# Patient Record
Sex: FEMALE | Race: ASIAN | NOT HISPANIC OR LATINO | Employment: UNEMPLOYED | URBAN - METROPOLITAN AREA
[De-identification: names, ages, dates, MRNs, and addresses within clinical notes are randomized per-mention and may not be internally consistent; named-entity substitution may affect disease eponyms.]

---

## 2017-04-19 ENCOUNTER — OFFICE VISIT (OUTPATIENT)
Dept: FAMILY MEDICINE | Facility: CLINIC | Age: 42
End: 2017-04-19
Payer: COMMERCIAL

## 2017-04-19 VITALS
SYSTOLIC BLOOD PRESSURE: 104 MMHG | RESPIRATION RATE: 10 BRPM | DIASTOLIC BLOOD PRESSURE: 62 MMHG | WEIGHT: 136 LBS | BODY MASS INDEX: 25.68 KG/M2 | HEIGHT: 61 IN | HEART RATE: 76 BPM | TEMPERATURE: 97.8 F | OXYGEN SATURATION: 99 %

## 2017-04-19 DIAGNOSIS — R53.83 OTHER FATIGUE: ICD-10-CM

## 2017-04-19 DIAGNOSIS — L98.9 SKIN LESION: ICD-10-CM

## 2017-04-19 DIAGNOSIS — Z12.31 ENCOUNTER FOR SCREENING MAMMOGRAM FOR BREAST CANCER: ICD-10-CM

## 2017-04-19 DIAGNOSIS — Z00.00 ROUTINE GENERAL MEDICAL EXAMINATION AT A HEALTH CARE FACILITY: Primary | ICD-10-CM

## 2017-04-19 DIAGNOSIS — T83.32XA IUD STRINGS LOST: ICD-10-CM

## 2017-04-19 LAB
ALBUMIN SERPL-MCNC: 3.9 G/DL (ref 3.4–5)
ALP SERPL-CCNC: 74 U/L (ref 40–150)
ALT SERPL W P-5'-P-CCNC: 37 U/L (ref 0–50)
ANION GAP SERPL CALCULATED.3IONS-SCNC: 10 MMOL/L (ref 3–14)
AST SERPL W P-5'-P-CCNC: 20 U/L (ref 0–45)
BASOPHILS # BLD AUTO: 0 10E9/L (ref 0–0.2)
BASOPHILS NFR BLD AUTO: 0.3 %
BILIRUB SERPL-MCNC: 0.6 MG/DL (ref 0.2–1.3)
BUN SERPL-MCNC: 13 MG/DL (ref 7–30)
CALCIUM SERPL-MCNC: 9.7 MG/DL (ref 8.5–10.1)
CHLORIDE SERPL-SCNC: 106 MMOL/L (ref 94–109)
CHOLEST SERPL-MCNC: 163 MG/DL
CO2 SERPL-SCNC: 25 MMOL/L (ref 20–32)
CREAT SERPL-MCNC: 0.37 MG/DL (ref 0.52–1.04)
DIFFERENTIAL METHOD BLD: ABNORMAL
EOSINOPHIL # BLD AUTO: 0.1 10E9/L (ref 0–0.7)
EOSINOPHIL NFR BLD AUTO: 1.7 %
ERYTHROCYTE [DISTWIDTH] IN BLOOD BY AUTOMATED COUNT: 11.6 % (ref 10–15)
GFR SERPL CREATININE-BSD FRML MDRD: ABNORMAL ML/MIN/1.7M2
GLUCOSE SERPL-MCNC: 92 MG/DL (ref 70–99)
HCT VFR BLD AUTO: 39.8 % (ref 35–47)
HDLC SERPL-MCNC: 67 MG/DL
HGB BLD-MCNC: 13.2 G/DL (ref 11.7–15.7)
LDLC SERPL CALC-MCNC: 83 MG/DL
LYMPHOCYTES # BLD AUTO: 2 10E9/L (ref 0.8–5.3)
LYMPHOCYTES NFR BLD AUTO: 56.9 %
MCH RBC QN AUTO: 29.7 PG (ref 26.5–33)
MCHC RBC AUTO-ENTMCNC: 33.2 G/DL (ref 31.5–36.5)
MCV RBC AUTO: 89 FL (ref 78–100)
MICRO REPORT STATUS: NORMAL
MONOCYTES # BLD AUTO: 0.4 10E9/L (ref 0–1.3)
MONOCYTES NFR BLD AUTO: 11.7 %
NEUTROPHILS # BLD AUTO: 1 10E9/L (ref 1.6–8.3)
NEUTROPHILS NFR BLD AUTO: 29.4 %
NONHDLC SERPL-MCNC: 96 MG/DL
PLATELET # BLD AUTO: 268 10E9/L (ref 150–450)
POTASSIUM SERPL-SCNC: 4.3 MMOL/L (ref 3.4–5.3)
PROT SERPL-MCNC: 8.1 G/DL (ref 6.8–8.8)
RBC # BLD AUTO: 4.45 10E12/L (ref 3.8–5.2)
SODIUM SERPL-SCNC: 141 MMOL/L (ref 133–144)
SPECIMEN SOURCE: NORMAL
T4 FREE SERPL-MCNC: 3.02 NG/DL (ref 0.76–1.46)
TRIGL SERPL-MCNC: 67 MG/DL
TSH SERPL DL<=0.005 MIU/L-ACNC: <0.01 MU/L (ref 0.4–4)
WBC # BLD AUTO: 3.5 10E9/L (ref 4–11)
WET PREP SPEC: NORMAL

## 2017-04-19 PROCEDURE — 90471 IMMUNIZATION ADMIN: CPT | Performed by: NURSE PRACTITIONER

## 2017-04-19 PROCEDURE — 90715 TDAP VACCINE 7 YRS/> IM: CPT | Performed by: NURSE PRACTITIONER

## 2017-04-19 PROCEDURE — 80061 LIPID PANEL: CPT | Performed by: NURSE PRACTITIONER

## 2017-04-19 PROCEDURE — 87210 SMEAR WET MOUNT SALINE/INK: CPT | Performed by: NURSE PRACTITIONER

## 2017-04-19 PROCEDURE — 87624 HPV HI-RISK TYP POOLED RSLT: CPT | Performed by: NURSE PRACTITIONER

## 2017-04-19 PROCEDURE — 84439 ASSAY OF FREE THYROXINE: CPT | Performed by: NURSE PRACTITIONER

## 2017-04-19 PROCEDURE — 82306 VITAMIN D 25 HYDROXY: CPT | Performed by: NURSE PRACTITIONER

## 2017-04-19 PROCEDURE — 99386 PREV VISIT NEW AGE 40-64: CPT | Mod: 25 | Performed by: NURSE PRACTITIONER

## 2017-04-19 PROCEDURE — 36415 COLL VENOUS BLD VENIPUNCTURE: CPT | Performed by: NURSE PRACTITIONER

## 2017-04-19 PROCEDURE — 87491 CHLMYD TRACH DNA AMP PROBE: CPT | Performed by: NURSE PRACTITIONER

## 2017-04-19 PROCEDURE — 87591 N.GONORRHOEAE DNA AMP PROB: CPT | Performed by: NURSE PRACTITIONER

## 2017-04-19 PROCEDURE — G0145 SCR C/V CYTO,THINLAYER,RESCR: HCPCS | Performed by: NURSE PRACTITIONER

## 2017-04-19 PROCEDURE — 80050 GENERAL HEALTH PANEL: CPT | Performed by: NURSE PRACTITIONER

## 2017-04-19 NOTE — PROGRESS NOTES
SUBJECTIVE:     CC: Sunil Glass is an 42 year old woman who presents for preventive health visit.     Healthy Habits:    Do you get at least three servings of calcium containing foods daily (dairy, green leafy vegetables, etc.)? yes    Amount of exercise or daily activities, outside of work: 3 day(s) per week    Problems taking medications regularly No    Medication side effects: No    Have you had an eye exam in the past two years? no    Do you see a dentist twice per year? No    Do you have sleep apnea, excessive snoring or daytime drowsiness?no    Health Maintenance -- Patient reports last physical exam was 4 years ago.   GYN: She had an IUD that was placed 14 years ago in [place]. Periods are normal w/ light flow. Would like to have IDU removed and replaced.     Mammogram -- Has never had completed. Agrees to have done.    Pap Smear -- Will complete today.   Colonoscopy -- Not appropriate based on age    Skin -- States that she has a bump on her L thigh. She noticed it 2-3 years ago. Was very small upon onset but has been getting bigger. Denies pain or itching.    Social -- Single. Working as massage therapist. Has 2 children that live with her (daughter age 21, son age 14).      present for visit.    Today's PHQ-2 Score:   PHQ-2 ( 1999 Pfizer) 4/19/2017   Q1: Little interest or pleasure in doing things 0   Q2: Feeling down, depressed or hopeless 0   PHQ-2 Score 0     Abuse: Current or Past(Physical, Sexual or Emotional)- No  Do you feel safe in your environment - Yes    Social History   Substance Use Topics     Smoking status: Never Smoker     Smokeless tobacco: Never Used     Alcohol use No     The patient does not drink >3 drinks per day nor >7 drinks per week.    No results for input(s): CHOL, HDL, LDL, TRIG, CHOLHDLRATIO, NHDL in the last 59467 hours.    Reviewed orders with patient.  Reviewed health maintenance and updated orders accordingly - Yes    Mammo Decision Support:  Mammo discussed,  "patient agrees to have completed.    Pertinent mammograms are reviewed under the imaging tab.  History of abnormal Pap smear: NO - age 30- 65 PAP every 3 years recommended    Reviewed and updated as needed this visit by clinical staff  Tobacco  Allergies  Meds  Soc Hx      Reviewed and updated as needed this visit by Provider    No past medical history on file.     ROS:  Constitutional, HEENT, cardiovascular, pulmonary, GI, , musculoskeletal, neuro, skin, endocrine and psych systems are negative, except as in HPI or otherwise noted     This document serves as a record of the services and decisions personally performed and made by Susan Haase, CNP. It was created on her behalf by Kisha Abreu, a trained medical scribe. The creation of this document is based the provider's statements to the medical scribe.  Kisha Abreu April 19, 2017 8:38 AM     Problem list, Medication list, Allergies, and Medical/Social/Surgical histories reviewed in EPIC and updated as appropriate.  OBJECTIVE:     /62 (BP Location: Right arm, Patient Position: Chair, Cuff Size: Adult Regular)  Pulse 76  Temp 97.8  F (36.6  C) (Oral)  Resp 10  Ht 1.549 m (5' 1\")  Wt 61.7 kg (136 lb)  LMP 04/14/2017 (Exact Date)  SpO2 99%  Breastfeeding? No  BMI 25.7 kg/m2  EXAM:  GENERAL: healthy, alert and no distress  HENT: ear canals and TM's normal, nose and mouth without ulcers or lesions  NECK: no adenopathy, no asymmetry, masses, or scars and thyroid normal to palpation  RESP: lungs clear to auscultation - no rales, rhonchi or wheezes  BREAST: R breast w/ mole in R outer quadrant, normal without masses, tenderness or nipple discharge and no palpable axillary masses or adenopathy  CV: regular rate and rhythm, normal S1 S2, no S3 or S4, no murmur, click or rub, no peripheral edema  ABDOMEN: soft, nontender, no hepatosplenomegaly, no masses and bowel sounds normal   (female): normal female external genitalia, normal urethral " "meatus, vaginal mucosa pink, moist, well rugated, and normal cervix/adnexa/uterus without masses or discharge.  IUD strings are not visualized.  MS: no gross musculoskeletal defects noted, no edema  SKIN: 2.5cm papule on L lateral thigh, hyperpigmented, firm, and raised; no suspicious lesions or rashes  NEURO: Normal strength and tone, mentation intact and speech normal  PSYCH: mentation appears normal, affect normal/bright    ASSESSMENT/PLAN:       Sunil was seen today for physical.    Diagnoses and all orders for this visit:    Routine general medical examination at a health care facility  -     TDAP (ADACEL)  -     Lipid panel reflex to direct LDL  -     NEISSERIA GONORRHOEA PCR  -     CHLAMYDIA TRACHOMATIS PCR  -     Wet prep  -     Pap imaged thin layer screen with HPV - recommended age 30 - 65 years (select HPV order below)  -     HPV High Risk Types DNA Cervical    IUD strings lost: will get an ultrasound to ensure IUD is in place.    -     US Pelvic Complete w Transvaginal; Future    Other fatigue  -     CBC with platelets differential  -     Comprehensive metabolic panel  -     TSH with free T4 reflex  -     Vitamin D Deficiency    Skin lesion return in next 2-4 weeks for removal.     Encounter for screening mammogram for breast cancer  -     *MA Screening Digital Bilateral; Future      COUNSELING:   Reviewed preventive health counseling, as reflected in patient instructions     reports that she has never smoked. She has never used smokeless tobacco.    Estimated body mass index is 25.7 kg/(m^2) as calculated from the following:    Height as of this encounter: 1.549 m (5' 1\").    Weight as of this encounter: 61.7 kg (136 lb).     Patient Instructions: Patient will follow up with gynecology for US guided IUD removal and replacement. Recommended to follow up for removal of skin papule.    Counseling Resources:  ATP IV Guidelines  Pooled Cohorts Equation Calculator  Breast Cancer Risk Calculator  FRAX Risk " Assessment  ICSI Preventive Guidelines  Dietary Guidelines for Americans, 2010  USDA's MyPlate  ASA Prophylaxis  Lung CA Screening  Follow up in 2-4 weeks for lesion removal.    The information in this document, created by the medical scribe for me, accurately reflects the services I personally performed and the decisions made by me. I have reviewed and approved this document for accuracy.   Susan Haase, APRN CNP  [facility]

## 2017-04-19 NOTE — MR AVS SNAPSHOT
After Visit Summary   4/19/2017    Sunil Glass    MRN: [id number]           Patient Information     Date Of Birth          1975        Visit Information        Provider Department      4/19/2017 8:15 AM Haase, Susan Rachele, APRN CNP; [employer] [facility]        Today's Diagnoses     Routine general medical examination at a health care facility    -  1    Other fatigue        Skin lesion        Encounter for screening mammogram for breast cancer        IUD strings lost          Care Instructions    1. Follow up with OBGYN for IUD removal and replacement.    2. Follow up visit for removal of skin papule on left thigh.    Preventive Health Recommendations  Female Ages 40 to 49    Yearly exam:     See your health care provider every year in order to  1. Review health changes.   2. Discuss preventive care.    3. Review your medicines if your doctor prescribed any.      Get a Pap test every three years (unless you have an abnormal result and your provider advises testing more often).      If you get Pap tests with HPV test, you only need to test every 5 years, unless you have an abnormal result. You do not need a Pap test if your uterus was removed (hysterectomy) and you have not had cancer.      You should be tested each year for STDs (sexually transmitted diseases), if you're at risk.       Ask your doctor if you should have a mammogram.      Have a colonoscopy (test for colon cancer) if someone in your family has had colon cancer or polyps before age 50.       Have a cholesterol test every 5 years.       Have a diabetes test (fasting glucose) after age 45. If you are at risk for diabetes, you should have this test every 3 years.    Shots: Get a flu shot each year. Get a tetanus shot every 10 years.     Nutrition:     Eat at least 5 servings of fruits and vegetables each day.    Eat whole-grain bread, whole-wheat pasta and brown rice instead of white grains and  "rice.    Talk to your provider about Calcium and Vitamin D.     Lifestyle    Exercise at least 150 minutes a week (an average of 30 minutes a day, 5 days a week). This will help you control your weight and prevent disease.    Limit alcohol to one drink per day.    No smoking.     Wear sunscreen to prevent skin cancer.    See your dentist every six months for an exam and cleaning.        Follow-ups after your visit        Follow-up notes from your care team     Return in about 2 weeks (around 5/3/2017).      Future tests that were ordered for you today     Open Future Orders        Priority Expected Expires Ordered    US Pelvic Complete w Transvaginal Routine 7/18/2017 4/19/2018 4/19/2017    *MA Screening Digital Bilateral Routine  4/19/2018 4/19/2017            Who to contact     If you have questions or need follow up information about today's clinic visit or your schedule please contact [facility] directly at [phone].  Normal or non-critical lab and imaging results will be communicated to you by reQallhart, letter or phone within 4 business days after the clinic has received the results. If you do not hear from us within 7 days, please contact the clinic through reQallhart or phone. If you have a critical or abnormal lab result, we will notify you by phone as soon as possible.  Submit refill requests through Backpack or call your pharmacy and they will forward the refill request to us. Please allow 3 business days for your refill to be completed.          Additional Information About Your Visit        Backpack Information     Backpack lets you send messages to your doctor, view your test results, renew your prescriptions, schedule appointments and more. To sign up, go to [email or web address] . Click on \"Log in\" on the left side of the screen, which will take you to the Welcome page. Then click on \"Sign up Now\" on the right side of the page.     You will be asked to enter the access code listed " "below, as well as some personal information. Please follow the directions to create your username and password.     Your access code is: [email or web address]  Expires: 2017  9:04 AM     Your access code will  in 90 days. If you need help or a new code, please call your [facility] clinic or [phone].        Care EveryWhere ID     This is your Care EveryWhere ID. This could be used by other organizations to access your [facility] medical records  [id number]        Your Vitals Were     Pulse Temperature Respirations Height Last Period Pulse Oximetry    76 97.8  F (36.6  C) (Oral) 10 5' 1\" (1.549 m) 2017 (Exact Date) 99%    Breastfeeding? BMI (Body Mass Index)                No 25.7 kg/m2           Blood Pressure from Last 3 Encounters:   17 104/62    Weight from Last 3 Encounters:   17 136 lb (61.7 kg)              We Performed the Following     CBC with platelets differential     Comprehensive metabolic panel     Lipid panel reflex to direct LDL     TDAP (ADACEL)     TSH with free T4 reflex     Vitamin D Deficiency        Primary Care Provider    None Specified       No primary provider on file.        Thank you!     Thank you for choosing [facility]  for your care. Our goal is always to provide you with excellent care. Hearing back from our patients is one way we can continue to improve our services. Please take a few minutes to complete the written survey that you may receive in the mail after your visit with us. Thank you!             Your Updated Medication List - Protect others around you: Learn how to safely use, store and throw away your medicines at www.disposemymeds.org.          This list is accurate as of: 17  9:13 AM.  Always use your most recent med list.                   Brand Name Dispense Instructions for use    MULTIVITAMIN PO            "

## 2017-04-19 NOTE — NURSING NOTE
"Chief Complaint   Patient presents with     Physical       Initial /62 (BP Location: Right arm, Patient Position: Chair, Cuff Size: Adult Regular)  Pulse 76  Temp 97.8  F (36.6  C) (Oral)  Resp 10  Ht 5' 1\" (1.549 m)  Wt 136 lb (61.7 kg)  LMP 04/14/2017 (Exact Date)  SpO2 99%  Breastfeeding? No  BMI 25.7 kg/m2 Estimated body mass index is 25.7 kg/(m^2) as calculated from the following:    Height as of this encounter: 5' 1\" (1.549 m).    Weight as of this encounter: 136 lb (61.7 kg).  Medication Reconciliation: complete    "

## 2017-04-19 NOTE — LETTER
April 27, 2017    Tamica Glass  [address] [place] [address]    Dear Tamica,  We are happy to inform you that your PAP smear result from 04/19/17 is normal.  We are now able to do a follow up test on PAP smears. The DNA test is for HPV (Human Papilloma Virus). Cervical cancer is closely linked with certain types of HPV. Your result showed no evidence of high risk HPV.  Therefore we recommend you return in 3 years for your next pap smear.  You will still need to return to the clinic every year for an annual exam and other preventive tests.  Please contact the clinic at [phone] with any questions.  Sincerely,    Susan Haase, APRN CNP/esh

## 2017-04-19 NOTE — PATIENT INSTRUCTIONS
1. Follow up with OBGYN for IUD removal and replacement.    2. Follow up visit for removal of skin papule on left thigh.    Preventive Health Recommendations  Female Ages 40 to 49    Yearly exam:     See your health care provider every year in order to  1. Review health changes.   2. Discuss preventive care.    3. Review your medicines if your doctor prescribed any.      Get a Pap test every three years (unless you have an abnormal result and your provider advises testing more often).      If you get Pap tests with HPV test, you only need to test every 5 years, unless you have an abnormal result. You do not need a Pap test if your uterus was removed (hysterectomy) and you have not had cancer.      You should be tested each year for STDs (sexually transmitted diseases), if you're at risk.       Ask your doctor if you should have a mammogram.      Have a colonoscopy (test for colon cancer) if someone in your family has had colon cancer or polyps before age 50.       Have a cholesterol test every 5 years.       Have a diabetes test (fasting glucose) after age 45. If you are at risk for diabetes, you should have this test every 3 years.    Shots: Get a flu shot each year. Get a tetanus shot every 10 years.     Nutrition:     Eat at least 5 servings of fruits and vegetables each day.    Eat whole-grain bread, whole-wheat pasta and brown rice instead of white grains and rice.    Talk to your provider about Calcium and Vitamin D.     Lifestyle    Exercise at least 150 minutes a week (an average of 30 minutes a day, 5 days a week). This will help you control your weight and prevent disease.    Limit alcohol to one drink per day.    No smoking.     Wear sunscreen to prevent skin cancer.    See your dentist every six months for an exam and cleaning.

## 2017-04-20 LAB
C TRACH DNA SPEC QL NAA+PROBE: NORMAL
DEPRECATED CALCIDIOL+CALCIFEROL SERPL-MC: 23 UG/L (ref 20–75)
N GONORRHOEA DNA SPEC QL NAA+PROBE: NORMAL
SPECIMEN SOURCE: NORMAL
SPECIMEN SOURCE: NORMAL

## 2017-04-21 ENCOUNTER — TELEPHONE (OUTPATIENT)
Dept: FAMILY MEDICINE | Facility: CLINIC | Age: 42
End: 2017-04-21

## 2017-04-21 PROBLEM — E05.90 HYPERTHYROIDISM: Status: ACTIVE | Noted: 2017-04-21

## 2017-04-21 LAB
COPATH REPORT: NORMAL
PAP: NORMAL

## 2017-04-21 NOTE — TELEPHONE ENCOUNTER
Call out to pt, interrupter on phone, gave message, scheduled appt with ASTRID Coleman RN, BS  Clinical Nurse Triage.

## 2017-04-21 NOTE — TELEPHONE ENCOUNTER
Can you please call Sunil and let her know that her thyroid levels are quite abnormal, it appears that she has hyperthyroidism.  I would like her to be seen by endocrinology for further evaluation, please have her set up an appointment with Endocrinology, Glory Sorto, at our [facility].  The other labs were normal.  Thanks,  Susan Haase, CNP

## 2017-04-24 ENCOUNTER — OFFICE VISIT (OUTPATIENT)
Dept: FAMILY MEDICINE | Facility: CLINIC | Age: 42
End: 2017-04-24
Payer: COMMERCIAL

## 2017-04-24 VITALS
DIASTOLIC BLOOD PRESSURE: 70 MMHG | BODY MASS INDEX: 25.7 KG/M2 | HEART RATE: 76 BPM | RESPIRATION RATE: 10 BRPM | SYSTOLIC BLOOD PRESSURE: 110 MMHG | WEIGHT: 136 LBS | TEMPERATURE: 97.7 F

## 2017-04-24 DIAGNOSIS — E05.90 HYPERTHYROIDISM: ICD-10-CM

## 2017-04-24 DIAGNOSIS — L98.9 SKIN LESION OF LEFT LEG: Primary | ICD-10-CM

## 2017-04-24 PROCEDURE — 11301 SHAVE SKIN LESION 0.6-1.0 CM: CPT | Performed by: NURSE PRACTITIONER

## 2017-04-24 PROCEDURE — 99212 OFFICE O/P EST SF 10 MIN: CPT | Mod: 25 | Performed by: NURSE PRACTITIONER

## 2017-04-24 PROCEDURE — 88305 TISSUE EXAM BY PATHOLOGIST: CPT | Performed by: NURSE PRACTITIONER

## 2017-04-24 NOTE — PROGRESS NOTES
SUBJECTIVE:                                                    Sunil Glass is a 42 year old female who presents to clinic today for the following health issues:    Derm: mole removal    Reported bump on her L thigh at physical exam 5 days ago. Present for 2-3 years and increasing in size.    Thyroid -- Reports that she has felt more anxious, has occasional difficulty swallowing, feels very fatigued, and sometimes feels like her heart is racing. Has appt. scheduled with Glory MORALES) in endocrinology on 4/26.  Lab results from 4/19:   TSH was low at <0.01 mU/L   T4 Free was high at 3.02 ng/dL     present for visit.    Problem list and histories reviewed & adjusted, as indicated.  Additional history: as documented    Patient Active Problem List   Diagnosis     IUD strings lost     Hyperthyroidism     History reviewed. No pertinent surgical history.    Social History   Substance Use Topics     Smoking status: Never Smoker     Smokeless tobacco: Never Used     Alcohol use No     History reviewed. No pertinent family history.      Current Outpatient Prescriptions   Medication Sig Dispense Refill     Multiple Vitamins-Minerals (MULTIVITAMIN PO)        No Known Allergies    Reviewed and updated as needed this visit by clinical staff    Reviewed and updated as needed this visit by Provider    ROS:  Constitutional, HEENT, cardiovascular, pulmonary, GI, , musculoskeletal, neuro, skin, endocrine and psych systems are negative, except as otherwise noted.    This document serves as a record of the services and decisions personally performed and made by Susan Haase, CNP. It was created on her behalf by Kisha Abreu, a trained medical scribe. The creation of this document is based the provider's statements to the medical scribe.  Kisha Abreu April 24, 2017 10:58 AM   OBJECTIVE:                                                    /70 (BP Location: Left arm, Patient Position: Chair, Cuff Size: Adult  Regular)  Pulse 76  Temp 97.7  F (36.5  C) (Oral)  Resp 10  Wt 61.7 kg (136 lb)  LMP 04/14/2017 (Exact Date)  BMI 25.7 kg/m2  Body mass index is 25.7 kg/(m^2).  GENERAL: healthy, alert and no distress  SKIN: 1 cm lesion on lateral aspect of L thigh, no suspicious lesions or rashes  NEURO: Normal strength and tone, mentation intact and speech normal  PSYCH: mentation appears normal, affect normal/bright    Diagnostic Test Results:  none      ASSESSMENT/PLAN:                                                    Sunil was seen today for lesion removal and results.    Diagnoses and all orders for this visit:    Skin lesion of left leg  -     BIOPSY SKIN/SUBQ/MUC MEM, SINGLE LESION  -     Surgical pathology exam    Skin Biopsy Procedure Note: Lateral L thigh    Consent: Affirmation of informed consent was signed and scanned into the medical record. Risks, benefits and alternatives were discussed. Patient's questions were elicited and answered.     Technique:   Skin prep Betadine  Anesthesia 0.5 cc 1% lidocaine, with epi   Biopsy size 0.5 cm  Biopsy taken via (shave, punch, incisional) shave  Suture  none  Hemostasis  monopolar cautery     EBL:     0  Complications:    No  Tolerance:    Pt tolerated procedure well and was in stable condition.   Pathology sent:   Yes  Specimen Collection Time: 11:22    Instructions:    Pt should keep dressing in place for 24 hours, then may change and apply antibiotic ointment and simple bandage. May shower after 24 hours.  Pt was instructed to call if bleeding, severe pain or foul smell.   Follow up only if unimproved.  Hyperthyroidism:  Has an appt with endocrinology on 4/26/2017    Follow up after pelvic ultrasound completed to discuss results.     The information in this document, created by the medical scribe for me, accurately reflects the services I personally performed and the decisions made by me. I have reviewed and approved this document for accuracy.   Susan Haase, APRN  [facility] [place]

## 2017-04-24 NOTE — MR AVS SNAPSHOT
After Visit Summary   4/24/2017    Sunil Glass    MRN: [id number]           Patient Information     Date Of Birth          1975        Visit Information        Provider Department      4/24/2017 10:45 AM Haase, Susan Rachele, APRN CNP; [employer] [facility]        Today's Diagnoses     Skin lesion of left leg    -  1    Hyperthyroidism           Follow-ups after your visit        Follow-up notes from your care team     Return if symptoms worsen or fail to improve.      Your next 10 appointments already scheduled     Apr 26, 2017 10:15 AM CDT   New Visit with ARIELLE Flores CNP, [employer]   [facility] ([facility])    [address]  [facility] [id number] [phone]            May 04, 2017  3:30 PM CDT   US PELVIC COMPLETE W TRANSVAGINAL with RIUS1   [facility] ([facility])    [address]  [address]  [facility] [address] [phone]           Please bring a list of your medicines (including vitamins, minerals and over-the-counter drugs). Also, tell your doctor about any allergies you may have. Wear comfortable clothes and leave your valuables at home.  Adults: Drink six 8-ounce glasses of fluid one hour before your exam. Do NOT empty your bladder.  If you need to empty your bladder before your exam, try to release only a little bit of urine. Then, drink another 8oz glass of fluid.  Children: Children who are potty trained should drink at least 4 cups (32 oz) of liquid 45 minutes to one hour prior to the exam. The child s bladder must be full in order to achieve a diagnostic exam. If your child is very uncomfortable or has an urgent need to pee, please notify a technologist; they will try to find out how much longer the wait may be and provide instructions to help relieve the pressure. Occasionally it is medically necessary to  "insert a urinary catheter to fill the bladder.  Please call the Imaging Department at your exam site with any questions.            May 26, 2017  9:45 AM CDT   Office Visit with Rosalba Moon MD, [employer]   [facility] ([facility])    [address] [place] [address] [phone]           Bring a current list of meds and any records pertaining to this visit.  For Physicals, please bring immunization records and any forms needing to be filled out.  Please arrive 10 minutes early to complete paperwork.              Who to contact     If you have questions or need follow up information about today's clinic visit or your schedule please contact [facility] directly at [phone].  Normal or non-critical lab and imaging results will be communicated to you by MyChart, letter or phone within 4 business days after the clinic has received the results. If you do not hear from us within 7 days, please contact the clinic through [employer] or phone. If you have a critical or abnormal lab result, we will notify you by phone as soon as possible.  Submit refill requests through [employer] or call your pharmacy and they will forward the refill request to us. Please allow 3 business days for your refill to be completed.          Additional Information About Your Visit        [employer] Information     [employer] lets you send messages to your doctor, view your test results, renew your prescriptions, schedule appointments and more. To sign up, go to [email or web address] . Click on \"Log in\" on the left side of the screen, which will take you to the Welcome page. Then click on \"Sign up Now\" on the right side of the page.     You will be asked to enter the access code listed below, as well as some personal information. Please follow the directions to create your username and password.     Your access code is: [email or web address]  Expires: 7/18/2017  " 9:04 AM     Your access code will  in 90 days. If you need help or a new code, please call your [facility] clinic or [phone].        Care EveryWhere ID     This is your Care EveryWhere ID. This could be used by other organizations to access your [facility] medical records  [id number]        Your Vitals Were     Pulse Temperature Respirations Last Period BMI (Body Mass Index)       76 97.7  F (36.5  C) (Oral) 10 2017 (Exact Date) 25.7 kg/m2        Blood Pressure from Last 3 Encounters:   17 110/70   17 104/62    Weight from Last 3 Encounters:   17 136 lb (61.7 kg)   17 136 lb (61.7 kg)              We Performed the Following     BIOPSY SKIN/SUBQ/MUC MEM, SINGLE LESION     Surgical pathology exam        Primary Care Provider Office Phone # Fax #    Susan Rachele Haase, APRN [phone]       [address] [address]        Thank you!     Thank you for choosing [facility]  for your care. Our goal is always to provide you with excellent care. Hearing back from our patients is one way we can continue to improve our services. Please take a few minutes to complete the written survey that you may receive in the mail after your visit with us. Thank you!             Your Updated Medication List - Protect others around you: Learn how to safely use, store and throw away your medicines at www.disposemymeds.org.          This list is accurate as of: 17 12:20 PM.  Always use your most recent med list.                   Brand Name Dispense Instructions for use    MULTIVITAMIN PO

## 2017-04-24 NOTE — NURSING NOTE
"Chief Complaint   Patient presents with     Lesion Removal     Results     discuss lab results       Initial /70 (BP Location: Left arm, Patient Position: Chair, Cuff Size: Adult Regular)  Pulse 76  Temp 97.7  F (36.5  C) (Oral)  Resp 10  Wt 136 lb (61.7 kg)  LMP 04/14/2017 (Exact Date)  BMI 25.7 kg/m2 Estimated body mass index is 25.7 kg/(m^2) as calculated from the following:    Height as of 4/19/17: 5' 1\" (1.549 m).    Weight as of this encounter: 136 lb (61.7 kg).  Medication Reconciliation: complete   Rosalia Palomino CMA      "

## 2017-04-24 NOTE — LETTER
"[facility]  [address] [place], [place], [address]  [phone]      April 27, 2017    Tamica Jimenez  [address]  [facility] [address]          Dear Tamica,      The biopsy of your skin did not show anything abnormal.  If the lesion returns I would suggest seeing a dermatologist to have a deeper biopsy completed. Please let me know if you have any questions. Enclosed is a copy of the results.  It was a pleasure to see you at your last appointment.    If you have any questions or concerns, please call myself or my nurse at [phone].          Sincerely,    Susan Haase CNP    Results for orders placed or performed in visit on 04/24/17   Surgical pathology exam   Result Value Ref Range    Copath Report       Patient Name: KHANG JIMENEZ  MR#: [id number]  Specimen #: [id number]  Collected: 4/24/2017  Received: 4/25/2017  Reported: 4/26/2017 09:01  Ordering Phy(s): SUSAN RACHELE HAASE    For improved result formatting, select 'View Enhanced Report Format'  under Linked Documents section.    SPECIMEN(S):  Lesion, left outer thigh    FINAL DIAGNOSIS:  Skin, left outer thigh, shave biopsy.  - Squamous hyperplasia/induction with superficial mild dermal fibrosis.  - Superficial sampling limits evaluation.  - See microscopic description.    Electronically signed out by:    Alvino Holguin M.D.    CLINICAL HISTORY:  Atypical lesion.    GROSS:  The specimen is received in formalin labeled with the patient's name,  identifying information and \"left upper thigh (requisition states skin  lesion left outer thigh)\".  It consists of a 0.5 x 0.4 cm tan-brown  discolored skin shave biopsy.  The margin is inked black.  Trisected and  submitted entirely in one block. (Dictated by: Josephine Newman 4/25/2017  09:1 0 AM)    MICROSCOPIC:  The biopsy is superficial and composed of epidermis and superficial  dermis.  The epidermis shows induction with hyperplasia, basal  pigmentation, and mild orthokeratotic " hyperkeratosis.  Within the  superficial portion of dermis there scattered small vessels and mild  fibrosis.  The changes do not appear specific and the superficial  sampling limits evaluation.  Superficial sampling of a scar, vascular  proliferation/hemangioma, and dermatofibroma was considered but again  the features do not appear specific.  The epidermal induction suggests  the lesion is lesion is deep to this shave biopsy.  There is no evidence  of malignancy in this sample.  Clinical correlation is required.    Case internally consulted with an additional pathologist who concurs.    CPT Codes:  A: 34982-OI7    TESTING LAB LOCATION:  [facility] [address]  [id number] [phone]    COLLECTION SITE:  Client: [facility]  Location: [facility] ( [facility])       DEBI

## 2017-04-25 LAB
FINAL DIAGNOSIS: NORMAL
HPV HR 12 DNA CVX QL NAA+PROBE: NEGATIVE
HPV16 DNA SPEC QL NAA+PROBE: NEGATIVE
HPV18 DNA SPEC QL NAA+PROBE: NEGATIVE
SPECIMEN DESCRIPTION: NORMAL

## 2017-04-26 ENCOUNTER — OFFICE VISIT (OUTPATIENT)
Dept: ENDOCRINOLOGY | Facility: CLINIC | Age: 42
End: 2017-04-26
Payer: COMMERCIAL

## 2017-04-26 VITALS
RESPIRATION RATE: 16 BRPM | OXYGEN SATURATION: 96 % | HEART RATE: 90 BPM | WEIGHT: 137 LBS | DIASTOLIC BLOOD PRESSURE: 68 MMHG | TEMPERATURE: 97.6 F | BODY MASS INDEX: 25.89 KG/M2 | SYSTOLIC BLOOD PRESSURE: 120 MMHG

## 2017-04-26 DIAGNOSIS — E05.00 TOXIC DIFFUSE GOITER: ICD-10-CM

## 2017-04-26 DIAGNOSIS — D72.819 LEUKOPENIA, UNSPECIFIED TYPE: ICD-10-CM

## 2017-04-26 DIAGNOSIS — E05.90 HYPERTHYROIDISM: Primary | ICD-10-CM

## 2017-04-26 LAB
COPATH REPORT: NORMAL
ERYTHROCYTE [DISTWIDTH] IN BLOOD BY AUTOMATED COUNT: 11.7 % (ref 10–15)
ERYTHROCYTE [SEDIMENTATION RATE] IN BLOOD BY WESTERGREN METHOD: 16 MM/H (ref 0–20)
HCT VFR BLD AUTO: 39.8 % (ref 35–47)
HGB BLD-MCNC: 13.3 G/DL (ref 11.7–15.7)
MCH RBC QN AUTO: 29.6 PG (ref 26.5–33)
MCHC RBC AUTO-ENTMCNC: 33.4 G/DL (ref 31.5–36.5)
MCV RBC AUTO: 88 FL (ref 78–100)
PLATELET # BLD AUTO: 239 10E9/L (ref 150–450)
RBC # BLD AUTO: 4.5 10E12/L (ref 3.8–5.2)
T3FREE SERPL-MCNC: 13 PG/ML (ref 2.3–4.2)
WBC # BLD AUTO: 4 10E9/L (ref 4–11)

## 2017-04-26 PROCEDURE — 99000 SPECIMEN HANDLING OFFICE-LAB: CPT | Performed by: CLINICAL NURSE SPECIALIST

## 2017-04-26 PROCEDURE — 86800 THYROGLOBULIN ANTIBODY: CPT | Performed by: CLINICAL NURSE SPECIALIST

## 2017-04-26 PROCEDURE — 84481 FREE ASSAY (FT-3): CPT | Performed by: CLINICAL NURSE SPECIALIST

## 2017-04-26 PROCEDURE — 84445 ASSAY OF TSI GLOBULIN: CPT | Mod: 90 | Performed by: CLINICAL NURSE SPECIALIST

## 2017-04-26 PROCEDURE — 36415 COLL VENOUS BLD VENIPUNCTURE: CPT | Performed by: CLINICAL NURSE SPECIALIST

## 2017-04-26 PROCEDURE — 86376 MICROSOMAL ANTIBODY EACH: CPT | Performed by: CLINICAL NURSE SPECIALIST

## 2017-04-26 PROCEDURE — 85652 RBC SED RATE AUTOMATED: CPT | Performed by: CLINICAL NURSE SPECIALIST

## 2017-04-26 PROCEDURE — 99214 OFFICE O/P EST MOD 30 MIN: CPT | Performed by: CLINICAL NURSE SPECIALIST

## 2017-04-26 PROCEDURE — 85027 COMPLETE CBC AUTOMATED: CPT | Performed by: CLINICAL NURSE SPECIALIST

## 2017-04-26 RX ORDER — PROPRANOLOL HYDROCHLORIDE 10 MG/1
TABLET ORAL
Qty: 90 TABLET | Refills: 0 | Status: SHIPPED | OUTPATIENT
Start: 2017-04-26 | End: 2017-06-12

## 2017-04-26 NOTE — PROGRESS NOTES
Name: Sunil Glass  Seen at the request of Haase, Susan Rachele for Hyperthyroidism.    HPI:  Sunil Glass is a 42 year old female who presents for the evaluation of Hyperthyroidism.  She comes in today accompanied by an .  She has noted increased anxiety, fatigue, some difficulty swallowing, and racing heart the past few weeks.  Recent labs were significant for elevated free T4 with suppressed TSH.  She denies any past history of thyroid disease.  No known FH of thyroid dysfunction or thyroid cancer.    History of radiation exposure: NO  History of thyroid dysfunction: NO  Palpitations:  Yes: comes and goes  Changes to hair or skin: No  Diarrhea/Constipation:No  Changes in menses: Yes: lighter flow  Changes in vision:Yes: blurry vision, no double vision  Dysphagia or Shortness of breath:Yes: difficulty swallowing off and on  Muscle aches or pain:No  Tremors:Yes: hand tremor  Difficulty sleeping:Yes: sleep quality poor  Changes in weight: No  Vital Signs 4/19/2017 4/24/2017 4/26/2017   Weight (LB) 136 lb 136 lb 137 lb     PMH/PSH:  No past medical history on file.  No past surgical history on file.  Family Hx:  No family history on file.  Thyroid disease: No         DM2:          Autoimmune: DM1, SLE, RA, Vitiligo No    Social Hx:  Social History     Social History     Marital status: Single     Spouse name: N/A     Number of children: N/A     Years of education: N/A     Occupational History     Not on file.     Social History Main Topics     Smoking status: Never Smoker     Smokeless tobacco: Never Used     Alcohol use No     Drug use: No     Sexual activity: No     Other Topics Concern     Not on file     Social History Narrative          MEDICATIONS:  has a current medication list which includes the following prescription(s): propranolol and multiple vitamins-minerals.    ROS   ROS: 10 point ROS neg other than the symptoms noted above in the HPI.    Physical Exam   VS: /68 (BP Location: Left arm,  Patient Position: Chair, Cuff Size: Adult Regular)  Pulse 90  Temp 97.6  F (36.4  C) (Oral)  Resp 16  Wt 62.1 kg (137 lb)  LMP 04/14/2017 (Exact Date)  SpO2 96%  BMI 25.89 kg/m2  GENERAL: AXOX3, NAD, well dressed, answering questions appropriately, appears stated age.  HEENT:  no exophthalmos, no proptosis, EOMI, no lig lag, no retraction  NECK:  Supple, thyroid gland diffusely enlarged and firm, no distinct nodules noted, no adenopathy, no tenderness with palpation.  CV: slightly tachycardic with regular rhythm, no rubs, gallops, no murmurs  LUNGS: CTAB, no wheezes, rales, or rhonchi  ABDOMEN: soft, nontender, nondistended  EXTREMITIES: no edema, +pulses, no rashes, no lesions, no pretibial edema  NEUROLOGY: CN grossly intact, + fine tremor with outstretched hands.  MSK: grossly intact  SKIN: no rashes, no lesions    LABS:  TFTs:  !THYROID Latest Ref Rng & Units 4/19/2017   TSH 0.40 - 4.00 mU/L <0.01 (L)   T4 FREE 0.76 - 1.46 ng/dL 3.02 (H)     CBC:  Component      Latest Ref Rng & Units 4/19/2017   WBC      4.0 - 11.0 10e9/L 3.5 (L)   RBC Count      3.8 - 5.2 10e12/L 4.45   Hemoglobin      11.7 - 15.7 g/dL 13.2   Hematocrit      35.0 - 47.0 % 39.8     LFTs:  !LIPID/HEPATIC Latest Ref Rng & Units 4/19/2017   AST 0 - 45 U/L 20   ALT 0 - 50 U/L 37     All pertinent notes, labs, and images personally reviewed by me.     A/P  Ms.Yuhxia Glass is a 42 year old here for the evaluation of hyperthyroidism.    Suppressed TSH:  Differential for hyperthyroidism includes:  Graves' disease, thyroiditis, or autonomous hyperfunctioning nodule.  An uptake and scan of her thyroid gland will help differentiate the diagnosis.  In Graves' disease her uptake will be homogeneous and increased, in thyroiditis her uptake will be low, and in an autonomous hyperfunctioing nodule the uptake will be increased in a focal area.      Thyroid-stimulating immunoglobulins (TSI) can be detected in the majority of patients (77.8%) with Graves   disease.  It can be used to predict relapse or remission when using PTU/methimazole or radioiodine.  These assays have also been advocated for use in patients with subclinical Graves  hyperthyroidism or patients with unilateral ophthalmopathy.    Thyrotoxicosis associated with subacute thyroiditis is usually mild and transient.  The   patient lacks the physical findings of long-standing thyrotoxicosis. The goiter is typically  painful and low or absent 131I uptake.  Usually the erythrocyte sedimentation rate (ESR) and CRP are greatly elevated, and the leukocyte count may also be increased. Antibody titers are low or negative.    + propranolol 10-20 mg q 6 hours prn hyperthyroid symptoms.    Labs ordered today:   Orders Placed This Encounter   Procedures     NM Thyroid Uptake and Scan     US Thyroid     T3 Free     Erythrocyte sedimentation rate auto     Anti thyroglobulin antibody     Thyroid peroxidase antibody     Thyroid stimulating immunoglobulin     CBC with platelets     Radiology/Consults ordered today: NM THYROID UPTAKE AND SCAN  US THYROID    More than 50% of the time spent with Ms. Glass on counseling / coordinating her care.  Total face to face time was 30 minutes.      Follow-up:  follow up in 2 week(s) to review test results and discuss treatment options    Glory Sorto NP  Endocrinology  [facility]  CC: Haase, Susan Rachele

## 2017-04-26 NOTE — LETTER
[facility]  [address] [place], [address]  [phone]      May 3, 2017    Tamica Glass  [address]  [facility] [address]          Dear Tru Davey is a copy of the results.  It was a pleasure to see you at your last appointment.    The antibodies for Graves' Disease were positive indicating you do have Graves' disease and this is the cause of your overactive thyroid.  We will discuss this and treatment options at your follow up visit.  Glory Sorto NP  Endocrinology    Results for orders placed or performed in visit on 04/26/17   T3 Free   Result Value Ref Range    Free T3 13.0 (H) 2.3 - 4.2 pg/mL   Erythrocyte sedimentation rate auto   Result Value Ref Range    Sed Rate 16 0 - 20 mm/h   Anti thyroglobulin antibody   Result Value Ref Range    Thyroglobulin Antibody <20 <40 IU/mL   Thyroid peroxidase antibody   Result Value Ref Range    Thyroid Peroxidase Antibody 138 (H) <35 IU/mL   Thyroid stimulating immunoglobulin   Result Value Ref Range    Thyroid Stim Immunog 5.1 (H)    CBC with platelets   Result Value Ref Range    WBC 4.0 4.0 - 11.0 10e9/L    RBC Count 4.50 3.8 - 5.2 10e12/L    Hemoglobin 13.3 11.7 - 15.7 g/dL    Hematocrit 39.8 35.0 - 47.0 %    MCV 88 78 - 100 fl    MCH 29.6 26.5 - 33.0 pg    MCHC 33.4 31.5 - 36.5 g/dL    RDW 11.7 10.0 - 15.0 %    Platelet Count 239 150 - 450 10e9/L       If you have any questions or concerns, please call myself or my nurse at [phone].          Sincerely,    Glory Sorto NP

## 2017-04-26 NOTE — MR AVS SNAPSHOT
After Visit Summary   4/26/2017    Sunil Glass    MRN: [id number]           Patient Information     Date Of Birth          1975        Visit Information        Provider Department      4/26/2017 10:15 AM Glory Sorto APRN CNP; [employer] [facility]        Today's Diagnoses     Hyperthyroidism    -  1    Toxic diffuse goiter        Leukopenia, unspecified type           Follow-ups after your visit        Your next 10 appointments already scheduled     May 04, 2017  3:30 PM CDT   US PELVIC COMPLETE W TRANSVAGINAL with RIUS1   [facility] ([facility])    [address]  [address]  [facility] [address] [phone]           Please bring a list of your medicines (including vitamins, minerals and over-the-counter drugs). Also, tell your doctor about any allergies you may have. Wear comfortable clothes and leave your valuables at home.  Adults: Drink six 8-ounce glasses of fluid one hour before your exam. Do NOT empty your bladder.  If you need to empty your bladder before your exam, try to release only a little bit of urine. Then, drink another 8oz glass of fluid.  Children: Children who are potty trained should drink at least 4 cups (32 oz) of liquid 45 minutes to one hour prior to the exam. The child s bladder must be full in order to achieve a diagnostic exam. If your child is very uncomfortable or has an urgent need to pee, please notify a technologist; they will try to find out how much longer the wait may be and provide instructions to help relieve the pressure. Occasionally it is medically necessary to insert a urinary catheter to fill the bladder.  Please call the Imaging Department at your exam site with any questions.            May 26, 2017  9:45 AM CDT   Office Visit with Rosalba Moon MD   [facility]    [address] [place] [address]  "[place] [address]   [phone]           Bring a current list of meds and any records pertaining to this visit.  For Physicals, please bring immunization records and any forms needing to be filled out.  Please arrive 10 minutes early to complete paperwork.              Future tests that were ordered for you today     Open Future Orders        Priority Expected Expires Ordered    US Thyroid Routine  2018    NM Thyroid Uptake and Scan Routine  6/10/2017 2017            Who to contact     If you have questions or need follow up information about today's clinic visit or your schedule please contact [facility] directly at [phone].  Normal or non-critical lab and imaging results will be communicated to you by MyChart, letter or phone within 4 business days after the clinic has received the results. If you do not hear from us within 7 days, please contact the clinic through [email or web address] or phone. If you have a critical or abnormal lab result, we will notify you by phone as soon as possible.  Submit refill requests through [employer] or call your pharmacy and they will forward the refill request to us. Please allow 3 business days for your refill to be completed.          Additional Information About Your Visit        [email or web address] Information     [employer] lets you send messages to your doctor, view your test results, renew your prescriptions, schedule appointments and more. To sign up, go to [email or web address] . Click on \"Log in\" on the left side of the screen, which will take you to the Welcome page. Then click on \"Sign up Now\" on the right side of the page.     You will be asked to enter the access code listed below, as well as some personal information. Please follow the directions to create your username and password.     Your access code is: [email or web address]  Expires: 2017  9:04 AM     Your access code will  in 90 days. If you need help or a new code, please call your [facility] " clinic or [phone].        Care EveryWhere ID     This is your Care EveryWhere ID. This could be used by other organizations to access your [facility] medical records  [id number]        Your Vitals Were     Pulse Temperature Respirations Last Period Pulse Oximetry BMI (Body Mass Index)    90 97.6  F (36.4  C) (Oral) 16 04/14/2017 (Exact Date) 96% 25.89 kg/m2       Blood Pressure from Last 3 Encounters:   04/26/17 120/68   04/24/17 110/70   04/19/17 104/62    Weight from Last 3 Encounters:   04/26/17 62.1 kg (137 lb)   04/24/17 61.7 kg (136 lb)   04/19/17 61.7 kg (136 lb)              We Performed the Following     Anti thyroglobulin antibody     CBC with platelets     Erythrocyte sedimentation rate auto     T3 Free     Thyroid peroxidase antibody     Thyroid stimulating immunoglobulin        Primary Care Provider Office Phone # Fax #    Felisa Rachele Haase, APRN -[phone]       [address] [address]        Thank you!     Thank you for choosing [facility]  for your care. Our goal is always to provide you with excellent care. Hearing back from our patients is one way we can continue to improve our services. Please take a few minutes to complete the written survey that you may receive in the mail after your visit with us. Thank you!             Your Updated Medication List - Protect others around you: Learn how to safely use, store and throw away your medicines at www.disposemymeds.org.          This list is accurate as of: 4/26/17 10:49 AM.  Always use your most recent med list.                   Brand Name Dispense Instructions for use    MULTIVITAMIN PO

## 2017-04-27 LAB
THYROGLOB AB SERPL IA-ACNC: <20 IU/ML (ref 0–40)
THYROPEROXIDASE AB SERPL-ACNC: 138 IU/ML

## 2017-05-02 LAB — TSI SER-ACNC: 5.1

## 2017-05-03 NOTE — PROGRESS NOTES
Please mail comments and results to the patient.  Tamica,  Here's a copy of your lab results for your records.  The antibodies for Graves' Disease were positive indicating you do have Graves' disease and this is the cause of your overactive thyroid.  We will discuss this and treatment options at your follow up visit.  Glory Sorto NP  Endocrinology

## 2017-05-04 ENCOUNTER — HOSPITAL ENCOUNTER (OUTPATIENT)
Dept: ULTRASOUND IMAGING | Facility: CLINIC | Age: 42
Discharge: HOME | End: 2017-05-04
Attending: CLINICAL NURSE SPECIALIST | Admitting: CLINICAL NURSE SPECIALIST
Payer: COMMERCIAL

## 2017-05-04 ENCOUNTER — RADIANT APPOINTMENT (OUTPATIENT)
Dept: ULTRASOUND IMAGING | Facility: CLINIC | Age: 42
End: 2017-05-04
Attending: NURSE PRACTITIONER
Payer: COMMERCIAL

## 2017-05-04 DIAGNOSIS — T83.32XA IUD STRINGS LOST: ICD-10-CM

## 2017-05-04 DIAGNOSIS — E05.00 TOXIC DIFFUSE GOITER: ICD-10-CM

## 2017-05-04 DIAGNOSIS — D72.819 LEUKOPENIA, UNSPECIFIED TYPE: ICD-10-CM

## 2017-05-04 DIAGNOSIS — E05.90 HYPERTHYROIDISM: ICD-10-CM

## 2017-05-04 PROCEDURE — 76536 US EXAM OF HEAD AND NECK: CPT

## 2017-05-04 PROCEDURE — 76856 US EXAM PELVIC COMPLETE: CPT | Performed by: FAMILY MEDICINE

## 2017-05-04 PROCEDURE — 76830 TRANSVAGINAL US NON-OB: CPT | Mod: 59 | Performed by: FAMILY MEDICINE

## 2017-05-04 NOTE — LETTER
[facility]  [address] [place], [place], [address]  [phone]      May 8, 2017    Tamica Glass  [address]  [facility] [address]          Dear Tamica,    Here's a copy of your thyroid ultrasound for your records.  We'll discuss the results in detail at your 5/12 appointment.    Results for orders placed or performed during the hospital encounter of 05/04/17   US Thyroid    Narrative    ULTRASOUND  THYROID   5/4/2017 4:39 PM    HISTORY: Thyrotoxicosis.    COMPARISON: None.    FINDINGS: The thyroid gland appears mildly enlarged. The right lobe of  the thyroid measures 5.6 x 2.2 x 2.1 cm. The left lobe of the thyroid  measures 5.6 x 2.1 x 2 cm. The isthmus measures 0.9 cm. Background  thyroid parenchymal echogenicity is mildly heterogeneous. The thyroid  gland also appears diffusely hypervascular.    Individual thyroid nodules are measured as follows:   1. Hyperechoic nodule in the inferior right thyroid lobe measures 2 x  1.5 x 1.8 cm.  2. Hyperechoic nodule in the superior left thyroid lobe measures 0.8 x  0.5 x 0.4 cm.       Impression    IMPRESSION:   1. The thyroid gland is mildly enlarged and diffusely hypervascular.  2. Two hyperechoic thyroid nodules are noted, with the largest on the  right measuring 2 cm.    ARIAS PACK MD   Sincerely,    Glory Sorto NP

## 2017-05-05 DIAGNOSIS — N83.202 CYST OF LEFT OVARY: Primary | ICD-10-CM

## 2017-05-08 NOTE — PROGRESS NOTES
Please mail a copy of my comments and the results to the patient.  Tamica,  Here's a copy of your thyroid ultrasound for your records.  We'll discuss the results in detail at your 5/12 appointment.  Glory Sorto NP  Endocrinology

## 2017-05-09 ENCOUNTER — HOSPITAL ENCOUNTER (OUTPATIENT)
Dept: NUCLEAR MEDICINE | Facility: CLINIC | Age: 42
Setting detail: NUCLEAR MEDICINE
Discharge: HOME | End: 2017-05-10
Attending: CLINICAL NURSE SPECIALIST | Admitting: CLINICAL NURSE SPECIALIST
Payer: COMMERCIAL

## 2017-05-09 DIAGNOSIS — E05.90 HYPERTHYROIDISM: ICD-10-CM

## 2017-05-09 DIAGNOSIS — E05.00 TOXIC DIFFUSE GOITER: ICD-10-CM

## 2017-05-09 DIAGNOSIS — D72.819 LEUKOPENIA, UNSPECIFIED TYPE: ICD-10-CM

## 2017-05-09 PROCEDURE — A9516 IODINE I-123 SOD IODIDE MIC: HCPCS | Performed by: CLINICAL NURSE SPECIALIST

## 2017-05-09 PROCEDURE — 34300033 ZZH RX 343: Performed by: CLINICAL NURSE SPECIALIST

## 2017-05-09 RX ADMIN — Medication 234.1 UCI.: at 09:00

## 2017-05-09 NOTE — LETTER
[facility]  [address] [place], [place], [address]  [phone]      May 16, 2017    Tamica Glass  [address]  [facility] [address]          Dear Tamica,    Here is a copy of your recent thyroid scan results for your records.  We'll discuss the results in detail at your follow up visit on 5/12.    Results for orders placed or performed during the hospital encounter of 05/09/17   NM Thyroid Uptake and Scan    Narrative    NUCLEAR MEDICINE THYROID UPTAKE AND SCAN 5/10/2017 8:59 AM     HISTORY: Thyrotoxicosis, unspecified without thyrotoxic crisis or  storm. Thyrotoxicosis with diffuse goiter without thyrotoxic crisis or  storm. Decreased white blood cell count, unspecified.     COMPARISON: Thyroid ultrasound 5/4/2017.    TECHNIQUE: Patient was given 234 uCi of I-123 orally followed by  24-hour uptake and scan.    FINDINGS: Thyroid gland is enlarged with fairly homogeneous uptake  throughout most of the thyroid. There is an area of photopenia in the  lower pole right thyroid lobe corresponding to the nodule seen on  recent thyroid ultrasound. Findings are suspicious for a cold nodule.  24-hour uptake is 85% which is well above normal limits.    Normal range is 10-30% 24-hour uptake.      Impression    IMPRESSION: Enlarged thyroid gland with photopenic area corresponding  to the large right lower lobe thyroid nodule on prior ultrasound.  Findings are concerning for a cold nodule which have an increased  incidence of malignancy. Follow-up percutaneous biopsy of this nodule  suggested for further assessment. 24-hour uptake is markedly increased  at 85% consistent with hyperthyroid state.         DAV ARAIZA MD           Sincerely,    Glory moore

## 2017-05-10 ENCOUNTER — HOSPITAL ENCOUNTER (OUTPATIENT)
Dept: NUCLEAR MEDICINE | Facility: CLINIC | Age: 42
Setting detail: NUCLEAR MEDICINE
End: 2017-05-10
Attending: CLINICAL NURSE SPECIALIST
Payer: COMMERCIAL

## 2017-05-10 PROCEDURE — 78014 THYROID IMAGING W/BLOOD FLOW: CPT

## 2017-05-11 NOTE — PROGRESS NOTES
Please mail a copy of my comments and the results to the patient.  Tamica,  Here is a copy of your recent thyroid scan results for your records.  We'll discuss the results in detail at your follow up visit on 5/12.  Glory Sorto NP  Endocrinology

## 2017-05-12 ENCOUNTER — OFFICE VISIT (OUTPATIENT)
Dept: ENDOCRINOLOGY | Facility: CLINIC | Age: 42
End: 2017-05-12
Payer: COMMERCIAL

## 2017-05-12 VITALS
TEMPERATURE: 97.9 F | HEART RATE: 68 BPM | SYSTOLIC BLOOD PRESSURE: 108 MMHG | HEIGHT: 62 IN | DIASTOLIC BLOOD PRESSURE: 62 MMHG | BODY MASS INDEX: 25.21 KG/M2 | WEIGHT: 137 LBS

## 2017-05-12 DIAGNOSIS — E05.90 HYPERTHYROIDISM: ICD-10-CM

## 2017-05-12 DIAGNOSIS — E04.1 THYROID NODULE: ICD-10-CM

## 2017-05-12 DIAGNOSIS — E05.00 GRAVES DISEASE: Primary | ICD-10-CM

## 2017-05-12 PROCEDURE — 99214 OFFICE O/P EST MOD 30 MIN: CPT | Performed by: CLINICAL NURSE SPECIALIST

## 2017-05-12 RX ORDER — METHIMAZOLE 10 MG/1
10 TABLET ORAL
Qty: 60 TABLET | Refills: 1 | Status: SHIPPED | OUTPATIENT
Start: 2017-05-12 | End: 2017-06-12

## 2017-05-12 NOTE — PROGRESS NOTES
Name: Sunil Glass  Seen at the request of Haase, Susan Rachele for Hyperthyroidism (Last seen 4/26/2017).    HPI:  Sunil Glass is a 42 year old female who presents for the evaluation of Hyperthyroidism.  She comes in today accompanied by an .  She has noted increased anxiety, fatigue, some difficulty swallowing, and racing heart the past few weeks.  Recent labs were significant for elevated free T4 with suppressed TSH.  She denies any past history of thyroid disease.  No known FH of thyroid dysfunction or thyroid cancer.    She's here today to review recent test results.    TSI antibodies were elevated.  Thyroid uptake scan showed elevated 24-hour uptake of 85%  Thyroid ultrasound showed two thyroid nodules - 2.0 cm right nodule and a 0.8 cm left nodule.  Uptake scan showed a photopenic area of uptake in the right lobe corresponding to the 2.0 cm right nodule seen on ultrasound.    History of radiation exposure: NO  History of thyroid dysfunction: NO  Palpitations:  Yes: comes and goes  Changes to hair or skin: No  Diarrhea/Constipation:No  Changes in menses: Yes: lighter flow  Changes in vision:Yes: blurry vision, no double vision  Dysphagia or Shortness of breath:Yes: difficulty swallowing off and on  Muscle aches or pain:No  Tremors:Yes: hand tremor  Difficulty sleeping:Yes: sleep quality poor  Changes in weight: No  Vital Signs 4/19/2017 4/24/2017 4/26/2017   Weight (LB) 136 lb 136 lb 137 lb     PMH/PSH:  No past medical history on file.  No past surgical history on file.  Family Hx:  No family history on file.  Thyroid disease: No         DM2:          Autoimmune: DM1, SLE, RA, Vitiligo No    Social Hx:  Social History     Social History     Marital status: Single     Spouse name: N/A     Number of children: N/A     Years of education: N/A     Occupational History     Not on file.     Social History Main Topics     Smoking status: Never Smoker     Smokeless tobacco: Never Used     Alcohol use No      "Drug use: No     Sexual activity: No     Other Topics Concern     Not on file     Social History Narrative          MEDICATIONS:  has a current medication list which includes the following prescription(s): methimazole, propranolol, and multiple vitamins-minerals.    ROS   ROS: 10 point ROS neg other than the symptoms noted above in the HPI.    Physical Exam   VS: /62  Pulse 68  Temp 97.9  F (36.6  C)  Ht 1.575 m (5' 2\")  Wt 62.1 kg (137 lb)  LMP 04/14/2017 (Exact Date)  BMI 25.06 kg/m2  GENERAL: AXOX3, NAD, well dressed, answering questions appropriately, appears stated age.  HEENT:  no exophthalmos, no proptosis, EOMI, no lig lag, no retraction  NECK:  Supple, thyroid gland diffusely enlarged.  CV: slightly tachycardic with regular rhythm, no rubs, gallops, no murmurs  LUNGS: CTAB, no wheezes, rales, or rhonchi  ABDOMEN: soft, nontender, nondistended  EXTREMITIES: no edema, +pulses, no rashes, no lesions, no pretibial edema  NEUROLOGY: CN grossly intact, + fine tremor with outstretched hands.  MSK: grossly intact  SKIN: no rashes, no lesions    LABS:  TFTs:  !THYROID Latest Ref Rng & Units 4/19/2017   TSH 0.40 - 4.00 mU/L <0.01 (L)   T4 FREE 0.76 - 1.46 ng/dL 3.02 (H)     Component    Latest Ref Rng & Units 4/26/2017   Free T3    2.3 - 4.2 pg/mL 13.0 (H)     Component    Latest Ref Rng & Units 4/26/2017   Thyroglobulin Antibody    <40 IU/mL <20   Thyroid Peroxidase Antibody    <35 IU/mL 138 (H)   Thyroid Stim Immunog     5.1 (H)     CBC:  Component    Latest Ref Rng & Units 4/26/2017   WBC    4.0 - 11.0 10e9/L 4.0   RBC Count    3.8 - 5.2 10e12/L 4.50   Hemoglobin    11.7 - 15.7 g/dL 13.3   Hematocrit    35.0 - 47.0 % 39.8   MCV    78 - 100 fl 88   MCH    26.5 - 33.0 pg 29.6   MCHC    31.5 - 36.5 g/dL 33.4   RDW    10.0 - 15.0 % 11.7   Platelet Count    150 - 450 10e9/L 239     Component    Latest Ref Rng & Units 4/26/2017   Sed Rate    0 - 20 mm/h 16     LFTs:  !LIPID/HEPATIC Latest Ref Rng & Units " 4/19/2017   AST 0 - 45 U/L 20   ALT 0 - 50 U/L 37     ULTRASOUND THYROID 5/4/2017      HISTORY: Thyrotoxicosis.     COMPARISON: None.     FINDINGS: The thyroid gland appears mildly enlarged. The right lobe of  the thyroid measures 5.6 x 2.2 x 2.1 cm. The left lobe of the thyroid  measures 5.6 x 2.1 x 2 cm. The isthmus measures 0.9 cm. Background  thyroid parenchymal echogenicity is mildly heterogeneous. The thyroid  gland also appears diffusely hypervascular.     Individual thyroid nodules are measured as follows:   1. Hyperechoic nodule in the inferior right thyroid lobe measures 2 x  1.5 x 1.8 cm.  2. Hyperechoic nodule in the superior left thyroid lobe measures 0.8 x  0.5 x 0.4 cm.      IMPRESSION:   1. The thyroid gland is mildly enlarged and diffusely hypervascular.  2. Two hyperechoic thyroid nodules are noted, with the largest on the  right measuring 2 cm.      NUCLEAR MEDICINE THYROID UPTAKE AND SCAN 5/10/2017      HISTORY: Thyrotoxicosis, unspecified without thyrotoxic crisis or  storm. Thyrotoxicosis with diffuse goiter without thyrotoxic crisis or  storm. Decreased white blood cell count, unspecified.      COMPARISON: Thyroid ultrasound 5/4/2017.     TECHNIQUE: Patient was given 234 uCi of I-123 orally followed by  24-hour uptake and scan.     FINDINGS: Thyroid gland is enlarged with fairly homogeneous uptake  throughout most of the thyroid. There is an area of photopenia in the  lower pole right thyroid lobe corresponding to the nodule seen on  recent thyroid ultrasound. Findings are suspicious for a cold nodule.  24-hour uptake is 85% which is well above normal limits.     Normal range is 10-30% 24-hour uptake.         IMPRESSION: Enlarged thyroid gland with photopenic area corresponding  to the large right lower lobe thyroid nodule on prior ultrasound.  Findings are concerning for a cold nodule which have an increased  incidence of malignancy. Follow-up percutaneous biopsy of this nodule  suggested for  further assessment. 24-hour uptake is markedly increased  at 85% consistent with hyperthyroid state.     All pertinent notes, labs, and images personally reviewed by me.     A/P  Ms.Yunxia Glass is a 42 year old here for the evaluation of Grave's disease.    Treatment of Graves' hyperthyroidism consists of amelioration of symptoms with a beta-blocker and measures aimed at decreasing thyroid hormone synthesis: the administration of a thionamide, radioiodine ablation, or surgery.  Thionamides -- Thionamides (methimazole and propylthiouracial (PTU)).  Methimazole is preferred because of its longer duration of action (once daily dosing) and has lower incidence of side effects.   PTU is preferred during pregnancy because of the potential teratogenic effects of methimazole.   The goal of therapy in Graves' hyperthyroidism is to be euthyroid within three to eight weeks. This can be followed by ablative therapy with radioiodine or surgery or by continuation of the drug for a prolonged period (usually one to two years) with the hope of attaining a permanent remission. If long-term medical therapy is chosen, the dose of methimazole is then tapered to a maintenance dose with the goal of maintaining a euthyroid state.  Both MMI and PTU can cause pruritus, rash, urticaria, arthralgias, arthritis, fever, abnormal taste sensation, nausea, or vomiting in up to 13 percent of patients.  If one drug is not tolerated, the other drug can be substituted, but up to 50 percent of patients have cross-sensitivity. The gastrointestinal side effects are dose-dependent. Thus, patients taking higher doses of MMI should be started on divided doses.  Agranulocytosis is a rare but serious complication of thionamide therapy, with a prevalence of 0.2 to 0.5 percent, and usually occurs within the first two months of treatment. The risk of agranulocytosis is higher for antithyroid drugs than for 20 other classes of drugs associated with this rare  complication.  Hepatotoxicity is a rare complication of thionamide therapy. Serum aminotransferase concentrations increase transiently in up to one-third of patients taking PTU.    Radioiodine ablation -- Radioiodine is widely used for the treatment of Graves' hyperthyroidism. It is the therapy of choice in the [place].  Radioiodine therapy may be associated with an increased risk of the development or worsening of Graves' ophthalmopathy.  Radioiodine is administered as a capsule and induces extensive tissue damage, resulting in ablation of the thyroid within 6 to 18 weeks.   Approximately 20 percent of patients fail the first radioiodine treatment and require a second or subsequent dose. These patients usually have more severe hyperthyroidism or larger goiters.  Suppressed TSH:  Differential for hyperthyroidism includes:  Graves' disease, thyroiditis, or autonomous hyperfunctioning nodule.  An uptake and scan of her thyroid gland will help differentiate the diagnosis.  In Graves' disease her uptake will be homogeneous and increased, in thyroiditis her uptake will be low, and in an autonomous hyperfunctioing nodule the uptake will be increased in a focal area.      Thyroid-stimulating immunoglobulins (TSI) can be detected in the majority of patients (77.8%) with Graves  disease.  It can be used to predict relapse or remission when using PTU/methimazole or radioiodine.  These assays have also been advocated for use in patients with subclinical Graves  hyperthyroidism or patients with unilateral ophthalmopathy.    Thyrotoxicosis associated with subacute thyroiditis is usually mild and transient.  The   patient lacks the physical findings of long-standing thyrotoxicosis. The goiter is typically  painful and low or absent 131I uptake.  Usually the erythrocyte sedimentation rate (ESR) and CRP are greatly elevated, and the leukocyte count may also be increased. Antibody titers are low or negative.    2.  Thyroid  nodules.  Thyroid nodules are common and are frequently benign. Data suggest that the prevalence of palpable thyroid nodules is 3% to 7% in [place]; the prevalence is as high as 50% based on ultrasonography (US) or autopsy data. All patients with a palpable thyroid nodule, however, should undergo US examination. US-guided FNA (US-FNA) is recommended for nodules ?10 mm; US-FNA is suggested for nodules <10 mm only if clinical information or US features are suspicious.    Causes of thyroid Nodules: Benign (Multinodular goiter, Hashimoto s thyroiditis, Simple or hemorrhagic cysts, Follicular adenomas, Subacute thyroiditis) or Malignant(Papillary carcinoma, Follicular carcinoma, Hürthle cell carcinoma, Medullary carcinoma, Anaplastic carcinoma, Primary thyroid lymphoma, or Metastatic malignant lesion).    MultiNodule:  The risk of cancer is not significantly higher in palpable solitary thyroid nodules than in multinodular lesions or in nodules in diffuse goiters. In multinodular thyroid glands, the cytologic sampling should be focused on lesions characterized by suspicious US features rather than on larger or clinically dominant nodules.    Cyst:  Most complex thyroid nodules with a dominant fluid component are benign. USFNA, however, should always be performed because the rare papillary thyroid carcinoma (PTC) can be cystic. An unsatisfactory (nondiagnostic) specimen usually results from a cystic nodule that yields few or no follicular cells. Reaspiration yields satisfactory results in 50% of cases.    Fine-Needle Aspiration Cytologic Diagnosis: 70% of FNA specimens are classified as benign; in addition, 5% are malignant, 10% are suspicious, and 10% to 20% are nondiagnostic or unsatisfactory. At surgical intervention, about 20% of such indeterminate/suspicious specimens are found to be malignant lesions.    Despite good initial technique, repeated biopsy, and US-FNA, approximately 5% of nodules still remain  nondiagnostic. Such thyroid nodules should be surgically excised.    Will obtain FNA biopsy of the 2.0 cm right thyroid nodule.  Start Methimazole 10 mg bid.  Continue propranolol 10-20 mg q 6 hours prn hyperthyroid symptoms.  Follow up in one month to review biopsy results.  Labs 2-3 days prior to follow up (TSH, FT4, WBC, hepatic panel).        Labs ordered today:   Orders Placed This Encounter   Procedures     US Biopsy Thyroid Fine Needle Aspiration     TSH     T4 FREE     WBC count     Hepatic panel     Radiology/Consults ordered today: US BIOPSY THYROID FINE NEEDLE ASPIRATION    More than 50% of the time spent with Ms. Glass on counseling / coordinating her care.  Total face to face time was 25 minutes.      Follow-up:  1 month    Glory Sorto NP  Endocrinology  [facility]  CC: Haase, Susan Rachele Parnerkar, Vaishnavi

## 2017-05-12 NOTE — MR AVS SNAPSHOT
After Visit Summary   5/12/2017    Tamica Glass    MRN: [id number]           Patient Information     Date Of Birth          1975        Visit Information        Provider Department      5/12/2017 9:45 AM Glory Sorto APRN CNP; [employer] [employer] [facility]        Today's Diagnoses     Graves disease    -  1    Thyroid nodule        Hyperthyroidism           Follow-ups after your visit        Your next 10 appointments already scheduled     May 26, 2017  9:45 AM CDT   Office Visit with Rosalba Moon MD   [facility]    [address] [place] [id number] [phone]           Bring a current list of meds and any records pertaining to this visit.  For Physicals, please bring immunization records and any forms needing to be filled out.  Please arrive 10 minutes early to complete paperwork.              Future tests that were ordered for you today     Open Future Orders        Priority Expected Expires Ordered    US Biopsy Thyroid Fine Needle Aspiration Routine  5/12/2018 5/12/2017            Who to contact     If you have questions or need follow up information about today's clinic visit or your schedule please contact [facility] directly at [phone].  Normal or non-critical lab and imaging results will be communicated to you by MyChart, letter or phone within 4 business days after the clinic has received the results. If you do not hear from us within 7 days, please contact the clinic through MyChart or phone. If you have a critical or abnormal lab result, we will notify you by phone as soon as possible.  Submit refill requests through [employer] or call your pharmacy and they will forward the refill request to us. Please allow 3 business days for your refill to be completed.          Additional Information About Your Visit        MyChart Information     [employer] lets you  "send messages to your doctor, view your test results, renew your prescriptions, schedule appointments and more. To sign up, go to [email or web address] . Click on \"Log in\" on the left side of the screen, which will take you to the Welcome page. Then click on \"Sign up Now\" on the right side of the page.     You will be asked to enter the access code listed below, as well as some personal information. Please follow the directions to create your username and password.     Your access code is: [email or web address]  Expires: 2017  9:04 AM     Your access code will  in 90 days. If you need help or a new code, please call your [facility] clinic or [phone].        Care EveryWhere ID     This is your Care EveryWhere ID. This could be used by other organizations to access your [facility] medical records  [id number]        Your Vitals Were     Pulse Temperature Height Last Period BMI (Body Mass Index)       68 97.9  F (36.6  C) 5' 2\" (1.575 m) 2017 (Exact Date) 25.06 kg/m2        Blood Pressure from Last 3 Encounters:   17 108/62   17 120/68   17 110/70    Weight from Last 3 Encounters:   17 137 lb (62.1 kg)   17 137 lb (62.1 kg)   17 136 lb (61.7 kg)                 Today's Medication Changes          These changes are accurate as of: 17 10:08 AM.  If you have any questions, ask your nurse or doctor.               Start taking these medicines.        Dose/Directions    methimazole 10 MG tablet   Commonly known as:  TAPAZOLE   Used for:  Graves disease   Started by:  Glory Sorto APRN CNP        Dose:  10 mg   Take 1 tablet (10 mg) by mouth 2 times daily   Quantity:  60 tablet   Refills:  1            Where to get your medicines      These medications were sent to [facility] [address]  [address] [facility] [address]     Phone:  [phone]     methimazole 10 MG tablet                Primary Care Provider Office " Phone # Fax #    Susan Rachele Haase, APRN [phone]       [facility] [address] [address]  [facility] [address]        Thank you!     Thank you for choosing [facility]  for your care. Our goal is always to provide you with excellent care. Hearing back from our patients is one way we can continue to improve our services. Please take a few minutes to complete the written survey that you may receive in the mail after your visit with us. Thank you!             Your Updated Medication List - Protect others around you: Learn how to safely use, store and throw away your medicines at www.disposemymeds.org.          This list is accurate as of: 5/12/17 10:08 AM.  Always use your most recent med list.                   Brand Name Dispense Instructions for use    methimazole 10 MG tablet    TAPAZOLE    60 tablet    Take 1 tablet (10 mg) by mouth 2 times daily       MULTIVITAMIN PO          propranolol 10 MG tablet    INDERAL    90 tablet    1-2 tablets every 6 hours prn for racing heart or tremor.

## 2017-05-19 ENCOUNTER — HOSPITAL ENCOUNTER (OUTPATIENT)
Dept: ULTRASOUND IMAGING | Facility: CLINIC | Age: 42
Discharge: HOME | End: 2017-05-19
Attending: CLINICAL NURSE SPECIALIST | Admitting: CLINICAL NURSE SPECIALIST
Payer: COMMERCIAL

## 2017-05-19 DIAGNOSIS — E05.00 GRAVES DISEASE: ICD-10-CM

## 2017-05-19 DIAGNOSIS — E04.1 THYROID NODULE: ICD-10-CM

## 2017-05-19 DIAGNOSIS — E05.90 HYPERTHYROIDISM: ICD-10-CM

## 2017-05-19 PROCEDURE — 00000102 ZZHCL STATISTIC CYTO WRIGHT STAIN TC: Performed by: CLINICAL NURSE SPECIALIST

## 2017-05-19 PROCEDURE — 88173 CYTOPATH EVAL FNA REPORT: CPT | Mod: 26 | Performed by: CLINICAL NURSE SPECIALIST

## 2017-05-19 PROCEDURE — 88173 CYTOPATH EVAL FNA REPORT: CPT | Performed by: CLINICAL NURSE SPECIALIST

## 2017-05-19 PROCEDURE — 76942 ECHO GUIDE FOR BIOPSY: CPT

## 2017-05-19 NOTE — PROCEDURES
RADIOLOGY PROCEDURE NOTE  Patient name: Tamica Glass  MRN: [id number]  : 1975    Pre-procedure diagnosis: Right thyroid nodule  Post-procedure diagnosis: Same    Procedure Date/Time: May 19, 2017  10:46 AM  Procedure: US guided Right nodule FNA.  No complications.  Tolerated well.  Estimated blood loss: < 5 ml  Specimen(s) collected with description: FNA samples obtained, sent to lab for evaluation.  The patient tolerated the procedure well with no immediate complications.  Significant findings:  Please see above.    See imaging dictation for procedural details.    Provider name: Abdon De La Garza  Assistant(s):None

## 2017-05-19 NOTE — PROGRESS NOTES
Right thyroid biopsy completed per Dr. De La Garza without difficulty, patient tolerated well. All discharge criteria were met and patient discharged to home ambulatory by self in stable condition.

## 2017-05-23 LAB — COPATH REPORT: NORMAL

## 2017-05-24 NOTE — PROGRESS NOTES
Please call -  Yunxia,  Your thyroid biopsy was benign.  We will discuss this in more detail at your follow up visit.  Glory Sorto NP  Endocrinology

## 2017-05-26 ENCOUNTER — OFFICE VISIT (OUTPATIENT)
Dept: OBGYN | Facility: CLINIC | Age: 42
End: 2017-05-26
Payer: COMMERCIAL

## 2017-05-26 VITALS
HEART RATE: 78 BPM | WEIGHT: 138.4 LBS | DIASTOLIC BLOOD PRESSURE: 82 MMHG | BODY MASS INDEX: 25.47 KG/M2 | SYSTOLIC BLOOD PRESSURE: 119 MMHG | RESPIRATION RATE: 14 BRPM | TEMPERATURE: 97.9 F | HEIGHT: 62 IN

## 2017-05-26 DIAGNOSIS — Z30.432 ENCOUNTER FOR IUD REMOVAL: Primary | ICD-10-CM

## 2017-05-26 PROBLEM — T83.32XA IUD STRINGS LOST: Status: RESOLVED | Noted: 2017-04-19 | Resolved: 2017-05-26

## 2017-05-26 PROCEDURE — 58301 REMOVE INTRAUTERINE DEVICE: CPT | Mod: 53 | Performed by: OBSTETRICS & GYNECOLOGY

## 2017-05-26 NOTE — NURSING NOTE
"Chief Complaint   Patient presents with     Consult     for ultrasound results, would like to know what her results mean and then she will make a desicion about having new IUD       Initial /82 (BP Location: Left arm, Patient Position: Chair, Cuff Size: Adult Regular)  Pulse 78  Temp 97.9  F (36.6  C) (Oral)  Resp 14  Ht 5' 2\" (1.575 m)  Wt 138 lb 6.4 oz (62.8 kg)  LMP 05/18/2017 (Approximate)  BMI 25.31 kg/m2 Estimated body mass index is 25.31 kg/(m^2) as calculated from the following:    Height as of this encounter: 5' 2\" (1.575 m).    Weight as of this encounter: 138 lb 6.4 oz (62.8 kg).  Medication Reconciliation: complete     Christiano Mcconnell CMA      "

## 2017-05-26 NOTE — MR AVS SNAPSHOT
After Visit Summary   5/26/2017    Tamica Glass    MRN: [id number]           Patient Information     Date Of Birth          1975        Visit Information        Provider Department      5/26/2017 9:45 AM Rosalba Moon MD; [employer] [facility]        Today's Diagnoses     Encounter for IUD removal    -  1       Follow-ups after your visit        Your next 10 appointments already scheduled     Jun 07, 2017 11:30 AM CDT   LAB with CR LAB   [facility])    [address] [facility] [address] [phone]           Patient must bring picture ID.  Patient should be prepared to give a urine specimen  Please do not eat 10-12 hours before your appointment if you are coming in fasting for labs on lipids, cholesterol, or glucose (sugar).  Pregnant women should follow their Care Team instructions. Water with medications is okay. Do not drink coffee or other fluids.   If you have concerns about taking  your medications, please ask at office or if scheduling via [employer], send a message by clicking on Secure Messaging, Message Your Care Team.            Jun 12, 2017  3:30 PM CDT   Return Visit with ARIELLE Flroes CNP   [facility]    [id number] [place] [address] [phone]              Who to contact     If you have questions or need follow up information about today's clinic visit or your schedule please contact [facility] directly at [phone].  Normal or non-critical lab and imaging results will be communicated to you by MyChart, letter or phone within 4 business days after the clinic has received the results. If you do not hear from us within 7 days, please contact the clinic through MyChart or phone. If you have a critical or abnormal lab result, we will notify you by phone as soon as  "possible.  Submit refill requests through [employer] or call your pharmacy and they will forward the refill request to us. Please allow 3 business days for your refill to be completed.          Additional Information About Your Visit        [employer] Information     [employer] lets you send messages to your doctor, view your test results, renew your prescriptions, schedule appointments and more. To sign up, go to [email or web address] . Click on \"Log in\" on the left side of the screen, which will take you to the Welcome page. Then click on \"Sign up Now\" on the right side of the page.     You will be asked to enter the access code listed below, as well as some personal information. Please follow the directions to create your username and password.     Your access code is: [email or web address]  Expires: 2017  9:04 AM     Your access code will  in 90 days. If you need help or a new code, please call your [place] clinic or [phone].        Care EveryWhere ID     This is your Care EveryWhere ID. This could be used by other organizations to access your [place] medical records  [id number]        Your Vitals Were     Pulse Temperature Respirations Height Last Period BMI (Body Mass Index)    78 97.9  F (36.6  C) (Oral) 14 5' 2\" (1.575 m) 2017 (Approximate) 25.31 kg/m2       Blood Pressure from Last 3 Encounters:   17 119/82   17 108/62   17 120/68    Weight from Last 3 Encounters:   17 138 lb 6.4 oz (62.8 kg)   17 137 lb (62.1 kg)   17 137 lb (62.1 kg)              We Performed the Following     REMOVE INTRAUTERINE DEVICE        Primary Care Provider Office Phone # Fax #    Susan Rachele Haase, APRN [phone]       [facility] [address] [place] [address]        Thank you!     Thank you for choosing [facility]  for your care. Our goal is always to provide you with excellent care. Hearing back from our patients is " one way we can continue to improve our services. Please take a few minutes to complete the written survey that you may receive in the mail after your visit with us. Thank you!             Your Updated Medication List - Protect others around you: Learn how to safely use, store and throw away your medicines at www.disposemymeds.org.          This list is accurate as of: 5/26/17 10:51 AM.  Always use your most recent med list.                   Brand Name Dispense Instructions for use    methimazole 10 MG tablet    TAPAZOLE    60 tablet    Take 1 tablet (10 mg) by mouth 2 times daily       MULTIVITAMIN PO          propranolol 10 MG tablet    INDERAL    90 tablet    1-2 tablets every 6 hours prn for racing heart or tremor.

## 2017-05-26 NOTE — PROGRESS NOTES
INDICATIONS:                                                      Tamica Glass is a 42 year old female  VD x1,  x 1 who presents today for copper  IUD removal. Her current IUD was placed 15 years ago in [place]. She has not had problems with the IUD. She requests removal of the IUD because she is not sexually active. She had a prior US and has a ring-type copper IUD in place; this was placed in [place].      PROCEDURE:                                                      A speculum exam was performed and the cervix was visualized. A tenaculum was placed on the anterior lip of the cervix. An IUD hook was used in an attempt to grasp and remove the IUD. Although I was able to feel the edge of the IUD with the hook, I was not able to remove it. I attempted the same with a curved forceps, and was also unable to remove the IUD. I have recommended hysteroscopic removal.    POST PROCEDURE:                                                      The patient tolerated the procedure well with minimal discomfort. Patient was discharged in stable condition.    She will make an appointment in [place] for removal.    Rosalba Moon MD

## 2017-06-07 DIAGNOSIS — E05.90 HYPERTHYROIDISM: ICD-10-CM

## 2017-06-07 DIAGNOSIS — E05.00 GRAVES DISEASE: ICD-10-CM

## 2017-06-07 DIAGNOSIS — E04.1 THYROID NODULE: ICD-10-CM

## 2017-06-07 LAB
ALBUMIN SERPL-MCNC: 3.9 G/DL (ref 3.4–5)
ALP SERPL-CCNC: 113 U/L (ref 40–150)
ALT SERPL W P-5'-P-CCNC: 47 U/L (ref 0–50)
AST SERPL W P-5'-P-CCNC: 19 U/L (ref 0–45)
BILIRUB DIRECT SERPL-MCNC: 0.1 MG/DL (ref 0–0.2)
BILIRUB SERPL-MCNC: 0.5 MG/DL (ref 0.2–1.3)
PROT SERPL-MCNC: 8.3 G/DL (ref 6.8–8.8)
T4 FREE SERPL-MCNC: 1.5 NG/DL (ref 0.76–1.46)
TSH SERPL DL<=0.05 MIU/L-ACNC: <0.01 MU/L (ref 0.4–4)
WBC # BLD AUTO: 3.7 10E9/L (ref 4–11)

## 2017-06-07 PROCEDURE — 80076 HEPATIC FUNCTION PANEL: CPT | Performed by: CLINICAL NURSE SPECIALIST

## 2017-06-07 PROCEDURE — 85048 AUTOMATED LEUKOCYTE COUNT: CPT | Performed by: CLINICAL NURSE SPECIALIST

## 2017-06-07 PROCEDURE — 84443 ASSAY THYROID STIM HORMONE: CPT | Performed by: CLINICAL NURSE SPECIALIST

## 2017-06-07 PROCEDURE — 36415 COLL VENOUS BLD VENIPUNCTURE: CPT | Performed by: CLINICAL NURSE SPECIALIST

## 2017-06-07 PROCEDURE — 84439 ASSAY OF FREE THYROXINE: CPT | Performed by: CLINICAL NURSE SPECIALIST

## 2017-06-12 ENCOUNTER — OFFICE VISIT (OUTPATIENT)
Dept: ENDOCRINOLOGY | Facility: CLINIC | Age: 42
End: 2017-06-12
Payer: COMMERCIAL

## 2017-06-12 VITALS
BODY MASS INDEX: 25.79 KG/M2 | TEMPERATURE: 98.4 F | WEIGHT: 141 LBS | DIASTOLIC BLOOD PRESSURE: 74 MMHG | HEART RATE: 83 BPM | SYSTOLIC BLOOD PRESSURE: 115 MMHG | RESPIRATION RATE: 12 BRPM

## 2017-06-12 DIAGNOSIS — E05.00 GRAVES DISEASE: Primary | ICD-10-CM

## 2017-06-12 PROCEDURE — 99214 OFFICE O/P EST MOD 30 MIN: CPT | Performed by: CLINICAL NURSE SPECIALIST

## 2017-06-12 RX ORDER — METHIMAZOLE 10 MG/1
10 TABLET ORAL
Qty: 60 TABLET | Refills: 3 | Status: SHIPPED | OUTPATIENT
Start: 2017-06-12 | End: 2017-07-11

## 2017-06-12 NOTE — MR AVS SNAPSHOT
After Visit Summary   6/12/2017    Tamica Glass    MRN: [id number]           Patient Information     Date Of Birth          1975        Visit Information        Provider Department      6/12/2017 3:15 PM Glory Sorto APRN CNP; [employer] [facility]        Today's Diagnoses     Graves disease    -  1      Care Instructions      Try over the counter benadryl up to 25 mg every 6 hours as needed for the rash.  Ask the pharmacist about over the counter topical lotions that contain benadryl.    If the rash persists, please contact me and I can try changing the medication Methimazole to a different medication called PTU but this medication can cause a rash too.  If neither medication is tolerated, then the other treatment option for your hyperthyroidism.    Continue Methimazole 10 mg twice daily.    Please make a lab-only appointment in one month.    Please make a follow up appointment in 3 months.    We will plan to repeat the thyroid ultrasound to monitor your thyroid nodules for growth in 6 months.    Glory Sorto NP  Endocrinology                Follow-ups after your visit        Future tests that were ordered for you today     Open Future Orders        Priority Expected Expires Ordered    TSH Routine  9/12/2017 6/12/2017    T4 FREE Routine  9/12/2017 6/12/2017    WBC count Routine  9/12/2017 6/12/2017            Who to contact     If you have questions or need follow up information about today's clinic visit or your schedule please contact [facility] directly at [phone].  Normal or non-critical lab and imaging results will be communicated to you by MyChart, letter or phone within 4 business days after the clinic has received the results. If you do not hear from us within 7 days, please contact the clinic through MyChart or phone. If you have a critical or abnormal lab result, we will notify you by phone as soon as possible.  Submit  "refill requests through [employer] or call your pharmacy and they will forward the refill request to us. Please allow 3 business days for your refill to be completed.          Additional Information About Your Visit        [employer] Information     [employer] lets you send messages to your doctor, view your test results, renew your prescriptions, schedule appointments and more. To sign up, go to [email or web address] . Click on \"Log in\" on the left side of the screen, which will take you to the Welcome page. Then click on \"Sign up Now\" on the right side of the page.     You will be asked to enter the access code listed below, as well as some personal information. Please follow the directions to create your username and password.     Your access code is: [email or web address]  Expires: 2017  9:04 AM     Your access code will  in 90 days. If you need help or a new code, please call your [place] clinic or [phone].        Care EveryWhere ID     This is your Care EveryWhere ID. This could be used by other organizations to access your [place] medical records  [id number]        Your Vitals Were     Pulse Temperature Respirations Last Period BMI (Body Mass Index)       83 98.4  F (36.9  C) (Oral) 12 2017 (Approximate) 25.79 kg/m2        Blood Pressure from Last 3 Encounters:   17 115/74   17 119/82   17 108/62    Weight from Last 3 Encounters:   17 64 kg (141 lb)   17 62.8 kg (138 lb 6.4 oz)   17 62.1 kg (137 lb)               Primary Care Provider Office Phone # Fax #    Susan Rachele Haase, APRN [phone]       [facility] [address] [place] [address]        Thank you!     Thank you for choosing [facility]  for your care. Our goal is always to provide you with excellent care. Hearing back from our patients is one way we can continue to improve our services. Please take a few minutes to complete the written " survey that you may receive in the mail after your visit with us. Thank you!             Your Updated Medication List - Protect others around you: Learn how to safely use, store and throw away your medicines at www.disposemymeds.org.          This list is accurate as of: 6/12/17  3:49 PM.  Always use your most recent med list.                   Brand Name Dispense Instructions for use    methimazole 10 MG tablet    TAPAZOLE    60 tablet    Take 1 tablet (10 mg) by mouth 2 times daily       MULTIVITAMIN PO          propranolol 10 MG tablet    INDERAL    90 tablet    1-2 tablets every 6 hours prn for racing heart or tremor.

## 2017-06-12 NOTE — PROGRESS NOTES
Name: Sunil Glass  Seen at the request of Haase, Susan Rachele for Hyperthyroidism (Last seen 5/12/2017).    HPI:  Sunil Glass is a 42 year old female who presents for the evaluation of Hyperthyroidism.  She comes in today accompanied by an .  She has noted increased anxiety, fatigue, some difficulty swallowing, and racing heart the past few weeks.  Recent labs were significant for elevated free T4 with suppressed TSH.  She denies any past history of thyroid disease.  No known FH of thyroid dysfunction or thyroid cancer.    She's here today for follow up.  TSI antibodies were elevated.  Thyroid uptake scan showed elevated 24-hour uptake of 85%  Thyroid ultrasound showed two thyroid nodules - 2.0 cm right nodule and a 0.8 cm left nodule.  Uptake scan showed a photopenic area of uptake in the right lobe corresponding to the 2.0 cm right nodule seen on ultrasound.  FNA biopsy of the 2.0 cm right nodule done 5/19/2017 was benign.  She started Methimazole 10 mg bid one month ago (5/12/2017).  Hyperthyroid symptoms resolved.  She's gained 5 lbs.  Noting some scattered patches - red, itchy rash x 3 days, minimal.    History of radiation exposure: NO  History of thyroid dysfunction: NO  Changes in weight: Yes, weight gain  Vital Signs 5/12/2017 5/26/2017 6/12/2017   Weight (LB) 137 lb 138 lb 6.4 oz 141 lb     PMH/PSH:  Past Medical History:   Diagnosis Date     Graves disease      History reviewed. No pertinent surgical history.  Family Hx:  History reviewed. No pertinent family history.  Thyroid disease: No         DM2:          Autoimmune: DM1, SLE, RA, Vitiligo No    Social Hx:  Social History     Social History     Marital status: Single     Spouse name: N/A     Number of children: N/A     Years of education: N/A     Occupational History     Not on file.     Social History Main Topics     Smoking status: Never Smoker     Smokeless tobacco: Never Used     Alcohol use No     Drug use: No     Sexual activity: No      Other Topics Concern     Not on file     Social History Narrative          MEDICATIONS:  has a current medication list which includes the following prescription(s): methimazole and multiple vitamins-minerals.    ROS   ROS: 10 point ROS neg other than the symptoms noted above in the HPI.    Physical Exam   VS: /74 (BP Location: Left arm, Patient Position: Chair, Cuff Size: Adult Regular)  Pulse 83  Temp 98.4  F (36.9  C) (Oral)  Resp 12  Wt 64 kg (141 lb)  LMP 05/18/2017 (Approximate)  BMI 25.79 kg/m2  GENERAL: AXOX3, NAD, well dressed, answering questions appropriately, appears stated age.  HEENT:  no exophthalmos, no proptosis, EOMI, no lig lag, no retraction  NECK:  Supple, thyroid gland diffusely enlarged, 2.0 cm right nodule palpable, + slightly enlarged right-sided lymph node, nontender.  CV: RRR  LUNGS: Clear  ABDOMEN: soft, nontender, nondistended  EXTREMITIES: no edema, +pulses, no rashes, no lesions, no pretibial edema  NEUROLOGY: CN grossly intact, no fine tremor with outstretched hands.  MSK: grossly intact  SKIN: no rashes, no lesions    LABS:  TFTs:  !THYROID Latest Ref Rng & Units 6/7/2017 4/19/2017   TSH 0.40 - 4.00 mU/L <0.01 (L) <0.01 (L)   T4 FREE 0.76 - 1.46 ng/dL 1.50 (H) 3.02 (H)     Component    Latest Ref Rng & Units 4/26/2017   Free T3    2.3 - 4.2 pg/mL 13.0 (H)     Component    Latest Ref Rng & Units 4/26/2017   Thyroglobulin Antibody    <40 IU/mL <20   Thyroid Peroxidase Antibody    <35 IU/mL 138 (H)   Thyroid Stim Immunog     5.1 (H)     CBC:  Component    Latest Ref Rng & Units 4/26/2017   WBC    4.0 - 11.0 10e9/L 4.0   RBC Count    3.8 - 5.2 10e12/L 4.50   Hemoglobin    11.7 - 15.7 g/dL 13.3   Hematocrit    35.0 - 47.0 % 39.8   MCV    78 - 100 fl 88   MCH    26.5 - 33.0 pg 29.6   MCHC    31.5 - 36.5 g/dL 33.4   RDW    10.0 - 15.0 % 11.7   Platelet Count    150 - 450 10e9/L 239     Component    Latest Ref Rng & Units 6/7/2017   WBC    4.0 - 11.0 10e9/L 3.7 (L)      Component    Latest Ref Rng & Units 4/26/2017   Sed Rate    0 - 20 mm/h 16     LFTs:  !LIPID/HEPATIC Latest Ref Rng & Units 4/19/2017 6/7/2017   AST 0 - 45 U/L 20 19   ALT 0 - 50 U/L 37 47       ULTRASOUND THYROID 5/4/2017      HISTORY: Thyrotoxicosis.     COMPARISON: None.     FINDINGS: The thyroid gland appears mildly enlarged. The right lobe of  the thyroid measures 5.6 x 2.2 x 2.1 cm. The left lobe of the thyroid  measures 5.6 x 2.1 x 2 cm. The isthmus measures 0.9 cm. Background  thyroid parenchymal echogenicity is mildly heterogeneous. The thyroid  gland also appears diffusely hypervascular.     Individual thyroid nodules are measured as follows:   1. Hyperechoic nodule in the inferior right thyroid lobe measures 2 x  1.5 x 1.8 cm.  2. Hyperechoic nodule in the superior left thyroid lobe measures 0.8 x  0.5 x 0.4 cm.      IMPRESSION:   1. The thyroid gland is mildly enlarged and diffusely hypervascular.  2. Two hyperechoic thyroid nodules are noted, with the largest on the  right measuring 2 cm.      NUCLEAR MEDICINE THYROID UPTAKE AND SCAN 5/10/2017      HISTORY: Thyrotoxicosis, unspecified without thyrotoxic crisis or  storm. Thyrotoxicosis with diffuse goiter without thyrotoxic crisis or  storm. Decreased white blood cell count, unspecified.      COMPARISON: Thyroid ultrasound 5/4/2017.     TECHNIQUE: Patient was given 234 uCi of I-123 orally followed by  24-hour uptake and scan.     FINDINGS: Thyroid gland is enlarged with fairly homogeneous uptake  throughout most of the thyroid. There is an area of photopenia in the  lower pole right thyroid lobe corresponding to the nodule seen on  recent thyroid ultrasound. Findings are suspicious for a cold nodule.  24-hour uptake is 85% which is well above normal limits.     Normal range is 10-30% 24-hour uptake.         IMPRESSION: Enlarged thyroid gland with photopenic area corresponding  to the large right lower lobe thyroid nodule on prior  ultrasound.  Findings are concerning for a cold nodule which have an increased  incidence of malignancy. Follow-up percutaneous biopsy of this nodule  suggested for further assessment. 24-hour uptake is markedly increased  at 85% consistent with hyperthyroid state.      Copath Report 05/19/2017 10:40 AM 88   Patient Name: SHANDA JIMENEZ   MR#: [id number]   Specimen #: [id number]   Collected: 5/19/2017   Received: 5/19/2017   Reported: 5/23/2017 09:24   Ordering Phy(s): RISA SORIANO   Additional Phy(s): SUSAN RACHELE HAASE     For improved result formatting, select 'View Enhanced Report Format',   under Linked Documents section.     SPECIMEN/STAIN PROCESS:   FNA-thyroid, right nodule        Pap-Cyto x 7, Garcia's stain-cyto x 2        CYTOLOGIC INTERPRETATION:      FNA-thyroid, right nodule:   Benign   Consistent with a benign nodule (includes adenomatoid nodule, colloid   nodule, etc.)     The [place] Implied Risk of Malignancy and Recommended Clinical   Management:   Benign has a 0-3% risk of malignancy, recommended management is clinical   follow-up     Specimen Adequacy: Satisfactory for evaluation.     All pertinent notes, labs, and images personally reviewed by me.     A/P  Ms.Yunxia Jimenez is a 42 year old here for the treatment/follow up of Grave's disease.    Currently treated with Methimazole 10 mg bid x 1 month.  Free T4 decreased 3.02 --> 1.5.    Scattered rash-type patches x 3 days, no generalized body rash -? Side effect of Methimazole.  Recommend she try benadryl up to 25 mg q 6 hours x 1 week. If rash persists or worsens, she'll contact me and will consider changing Methimazole to PTU.  Recent WBC slightly below normal.   Continue Methimazole 10 mg bid.  Repeat TFT's and WBC in one month.    Treatment of Graves' hyperthyroidism consists of amelioration of symptoms with a beta-blocker and measures aimed at decreasing thyroid hormone synthesis: the administration of a thionamide, radioiodine ablation,  or surgery.  Thionamides -- Thionamides (methimazole and propylthiouracial (PTU)).  Methimazole is preferred because of its longer duration of action (once daily dosing) and has lower incidence of side effects.   PTU is preferred during pregnancy because of the potential teratogenic effects of methimazole.   The goal of therapy in Graves' hyperthyroidism is to be euthyroid within three to eight weeks. This can be followed by ablative therapy with radioiodine or surgery or by continuation of the drug for a prolonged period (usually one to two years) with the hope of attaining a permanent remission. If long-term medical therapy is chosen, the dose of methimazole is then tapered to a maintenance dose with the goal of maintaining a euthyroid state.  Both MMI and PTU can cause pruritus, rash, urticaria, arthralgias, arthritis, fever, abnormal taste sensation, nausea, or vomiting in up to 13 percent of patients.  If one drug is not tolerated, the other drug can be substituted, but up to 50 percent of patients have cross-sensitivity. The gastrointestinal side effects are dose-dependent. Thus, patients taking higher doses of MMI should be started on divided doses.  Agranulocytosis is a rare but serious complication of thionamide therapy, with a prevalence of 0.2 to 0.5 percent, and usually occurs within the first two months of treatment. The risk of agranulocytosis is higher for antithyroid drugs than for 20 other classes of drugs associated with this rare complication.  Hepatotoxicity is a rare complication of thionamide therapy. Serum aminotransferase concentrations increase transiently in up to one-third of patients taking PTU.    Radioiodine ablation -- Radioiodine is widely used for the treatment of Graves' hyperthyroidism. It is the therapy of choice in the [place].  Radioiodine therapy may be associated with an increased risk of the development or worsening of Graves' ophthalmopathy.  Radioiodine is  administered as a capsule and induces extensive tissue damage, resulting in ablation of the thyroid within 6 to 18 weeks.   Approximately 20 percent of patients fail the first radioiodine treatment and require a second or subsequent dose. These patients usually have more severe hyperthyroidism or larger goiters.  Suppressed TSH:  Differential for hyperthyroidism includes:  Graves' disease, thyroiditis, or autonomous hyperfunctioning nodule.  An uptake and scan of her thyroid gland will help differentiate the diagnosis.  In Graves' disease her uptake will be homogeneous and increased, in thyroiditis her uptake will be low, and in an autonomous hyperfunctioing nodule the uptake will be increased in a focal area.      Thyroid-stimulating immunoglobulins (TSI) can be detected in the majority of patients (77.8%) with Graves  disease.  It can be used to predict relapse or remission when using PTU/methimazole or radioiodine.  These assays have also been advocated for use in patients with subclinical Graves  hyperthyroidism or patients with unilateral ophthalmopathy.    Thyrotoxicosis associated with subacute thyroiditis is usually mild and transient.  The   patient lacks the physical findings of long-standing thyrotoxicosis. The goiter is typically  painful and low or absent 131I uptake.  Usually the erythrocyte sedimentation rate (ESR) and CRP are greatly elevated, and the leukocyte count may also be increased. Antibody titers are low or negative.    2.  Thyroid nodules.  Thyroid nodules are common and are frequently benign. Data suggest that the prevalence of palpable thyroid nodules is 3% to 7% in [place]; the prevalence is as high as 50% based on ultrasonography (US) or autopsy data. All patients with a palpable thyroid nodule, however, should undergo US examination. US-guided FNA (US-FNA) is recommended for nodules ?10 mm; US-FNA is suggested for nodules <10 mm only if clinical information or US features are  suspicious.    Causes of thyroid Nodules: Benign (Multinodular goiter, Hashimoto s thyroiditis, Simple or hemorrhagic cysts, Follicular adenomas, Subacute thyroiditis) or Malignant(Papillary carcinoma, Follicular carcinoma, Hürthle cell carcinoma, Medullary carcinoma, Anaplastic carcinoma, Primary thyroid lymphoma, or Metastatic malignant lesion).    MultiNodule:  The risk of cancer is not significantly higher in palpable solitary thyroid nodules than in multinodular lesions or in nodules in diffuse goiters. In multinodular thyroid glands, the cytologic sampling should be focused on lesions characterized by suspicious US features rather than on larger or clinically dominant nodules.    Cyst:  Most complex thyroid nodules with a dominant fluid component are benign. USFNA, however, should always be performed because the rare papillary thyroid carcinoma (PTC) can be cystic. An unsatisfactory (nondiagnostic) specimen usually results from a cystic nodule that yields few or no follicular cells. Reaspiration yields satisfactory results in 50% of cases.    Fine-Needle Aspiration Cytologic Diagnosis: 70% of FNA specimens are classified as benign; in addition, 5% are malignant, 10% are suspicious, and 10% to 20% are nondiagnostic or unsatisfactory. At surgical intervention, about 20% of such indeterminate/suspicious specimens are found to be malignant lesions.    Despite good initial technique, repeated biopsy, and US-FNA, approximately 5% of nodules still remain nondiagnostic. Such thyroid nodules should be surgically excised.    FNA biopsy of the 2.0 cm right thyroid nodule done 5/19/2017 was benign.  Plan to repeat thyroid/neck ultrasound in 6 months.      Labs ordered today:   Orders Placed This Encounter   Procedures     TSH     T4 FREE     WBC count     Radiology/Consults ordered today: None    More than 50% of the time spent with Ms. Glass on counseling / coordinating her care.  Total face to face time was 25  minutes.      Follow-up:  3 months    Glory Sorto NP  Endocrinology  [facility]  CC: Haase, Susan Rachele

## 2017-06-12 NOTE — NURSING NOTE
"Chief Complaint   Patient presents with     Thyroid Problem       Initial /74 (BP Location: Left arm, Patient Position: Chair, Cuff Size: Adult Regular)  Pulse 83  Temp 98.4  F (36.9  C) (Oral)  Resp 12  Wt 141 lb (64 kg)  LMP 05/18/2017 (Approximate)  BMI 25.79 kg/m2 Estimated body mass index is 25.79 kg/(m^2) as calculated from the following:    Height as of 5/26/17: 5' 2\" (1.575 m).    Weight as of this encounter: 141 lb (64 kg).  Medication Reconciliation: complete    "

## 2017-06-12 NOTE — PATIENT INSTRUCTIONS
Try over the counter benadryl up to 25 mg every 6 hours as needed for the rash.  Ask the pharmacist about over the counter topical lotions that contain benadryl.    If the rash persists, please contact me and I can try changing the medication Methimazole to a different medication called PTU but this medication can cause a rash too.  If neither medication is tolerated, then the other treatment option for your hyperthyroidism.    Continue Methimazole 10 mg twice daily.    Please make a lab-only appointment in one month.    Please make a follow up appointment in 3 months.    We will plan to repeat the thyroid ultrasound to monitor your thyroid nodules for growth in 6 months.    Glory Sorto NP  Endocrinology

## 2017-07-10 DIAGNOSIS — E05.00 GRAVES DISEASE: ICD-10-CM

## 2017-07-10 LAB — WBC # BLD AUTO: 5.5 10E9/L (ref 4–11)

## 2017-07-10 PROCEDURE — 84439 ASSAY OF FREE THYROXINE: CPT | Performed by: CLINICAL NURSE SPECIALIST

## 2017-07-10 PROCEDURE — 36415 COLL VENOUS BLD VENIPUNCTURE: CPT | Performed by: CLINICAL NURSE SPECIALIST

## 2017-07-10 PROCEDURE — 84443 ASSAY THYROID STIM HORMONE: CPT | Performed by: CLINICAL NURSE SPECIALIST

## 2017-07-10 PROCEDURE — 85048 AUTOMATED LEUKOCYTE COUNT: CPT | Performed by: CLINICAL NURSE SPECIALIST

## 2017-07-11 ENCOUNTER — OFFICE VISIT (OUTPATIENT)
Dept: ENDOCRINOLOGY | Facility: CLINIC | Age: 42
End: 2017-07-11
Payer: COMMERCIAL

## 2017-07-11 VITALS
HEART RATE: 62 BPM | DIASTOLIC BLOOD PRESSURE: 47 MMHG | RESPIRATION RATE: 14 BRPM | SYSTOLIC BLOOD PRESSURE: 90 MMHG | BODY MASS INDEX: 26.52 KG/M2 | WEIGHT: 145 LBS | TEMPERATURE: 98.1 F

## 2017-07-11 DIAGNOSIS — E04.1 THYROID NODULE: Primary | ICD-10-CM

## 2017-07-11 DIAGNOSIS — E05.00 GRAVES DISEASE: ICD-10-CM

## 2017-07-11 PROCEDURE — 99214 OFFICE O/P EST MOD 30 MIN: CPT | Performed by: CLINICAL NURSE SPECIALIST

## 2017-07-11 RX ORDER — METHIMAZOLE 10 MG/1
10 TABLET ORAL
Qty: 60 TABLET | Refills: 3 | Status: SHIPPED | OUTPATIENT
Start: 2017-07-11 | End: 2017-07-15

## 2017-07-11 NOTE — PROGRESS NOTES
"Name: Sunil Glass  Seen at the request of Haase, Susan Rachele for Hyperthyroidism (Last seen 6/12/2017).    HPI:  Snuil Glass is a 42 year old female who presents for the evaluation of Hyperthyroidism.  She comes in today accompanied by an .  She previously noted increased anxiety, fatigue, some difficulty swallowing, and racing heart the past few weeks.  labs done for further evaluation were significant for elevated free T4 with suppressed TSH.  No past history of thyroid disease.  No known FH of thyroid dysfunction or thyroid cancer.    She's here today for follow up.  TSI antibodies were elevated.  Thyroid uptake scan showed elevated 24-hour uptake of 85%  Thyroid ultrasound showed two thyroid nodules - 2.0 cm right nodule and a 0.8 cm left nodule.  Uptake scan showed a photopenic area of uptake in the right lobe corresponding to the 2.0 cm right nodule seen on ultrasound.  FNA biopsy of the 2.0 cm right nodule done 5/19/2017 was benign.  She started Methimazole 10 mg bid (5/12/2017).  Hyperthyroid symptoms resolved.  She's gained 8 lbs.  She's having some difficulty staying asleep, awakens several times each night starting around 2 am.    History of radiation exposure: NO  History of thyroid dysfunction: NO  Changes in weight: Yes, weight gain  Vital Signs 5/12/2017 5/26/2017 6/12/2017 7/11/2017   Weight (LB) 137 lb 138 lb 6.4 oz 141 lb 145 lb   Height 5' 2\" 5' 2\"     BMI (Calculated) 25.11 25.37       PMH/PSH:  Past Medical History:   Diagnosis Date     Graves disease      History reviewed. No pertinent surgical history.  Family Hx:  History reviewed. No pertinent family history.  Thyroid disease: No         DM2:          Autoimmune: DM1, SLE, RA, Vitiligo No    Social Hx:  Social History     Social History     Marital status: Single     Spouse name: N/A     Number of children: N/A     Years of education: N/A     Occupational History     Not on file.     Social History Main Topics     Smoking " status: Never Smoker     Smokeless tobacco: Never Used     Alcohol use No     Drug use: No     Sexual activity: No     Other Topics Concern     Not on file     Social History Narrative          MEDICATIONS:  has a current medication list which includes the following prescription(s): methimazole and multiple vitamins-minerals.    ROS   ROS: 10 point ROS neg other than the symptoms noted above in the HPI.    Physical Exam   VS: BP 90/47 (BP Location: Left arm, Patient Position: Chair, Cuff Size: Adult Regular)  Pulse 62  Temp 98.1  F (36.7  C) (Oral)  Resp 14  Wt 65.8 kg (145 lb)  BMI 26.52 kg/m2  GENERAL: AXOX3, NAD, well dressed, answering questions appropriately, appears stated age.  HEENT:  no exophthalmos, no proptosis, EOMI, no lig lag, no retraction  NECK:  Supple, thyroid gland diffusely enlarged, 2.0 cm right nodule palpable, nontender.  CV: RRR  LUNGS: Clear  ABDOMEN: soft, nontender, nondistended  EXTREMITIES: no edema, +pulses, no rashes, no lesions, no pretibial edema  NEUROLOGY: CN grossly intact, no fine tremor with outstretched hands.  MSK: grossly intact  SKIN: no rashes, no lesions    LABS:  TFTs:  !THYROID Latest Ref Rng & Units 6/7/2017 4/19/2017   TSH 0.40 - 4.00 mU/L <0.01 (L) <0.01 (L)   T4 FREE 0.76 - 1.46 ng/dL 1.50 (H) 3.02 (H)     Component    Latest Ref Rng & Units 4/26/2017   Free T3    2.3 - 4.2 pg/mL 13.0 (H)     Component    Latest Ref Rng & Units 4/26/2017   Thyroglobulin Antibody    <40 IU/mL <20   Thyroid Peroxidase Antibody    <35 IU/mL 138 (H)   Thyroid Stim Immunog     5.1 (H)     CBC:  Component    Latest Ref Rng & Units 4/26/2017   WBC    4.0 - 11.0 10e9/L 4.0   RBC Count    3.8 - 5.2 10e12/L 4.50   Hemoglobin    11.7 - 15.7 g/dL 13.3   Hematocrit    35.0 - 47.0 % 39.8   MCV    78 - 100 fl 88   MCH    26.5 - 33.0 pg 29.6   MCHC    31.5 - 36.5 g/dL 33.4   RDW    10.0 - 15.0 % 11.7   Platelet Count    150 - 450 10e9/L 239     Component      Latest Ref Rng & Units 6/7/2017  7/10/2017   WBC      4.0 - 11.0 10e9/L 3.7 (L) 5.5     Component    Latest Ref Rng & Units 4/26/2017   Sed Rate    0 - 20 mm/h 16     LFTs:  !LIPID/HEPATIC Latest Ref Rng & Units 4/19/2017 6/7/2017   AST 0 - 45 U/L 20 19   ALT 0 - 50 U/L 37 47       ULTRASOUND THYROID 5/4/2017      HISTORY: Thyrotoxicosis.     COMPARISON: None.     FINDINGS: The thyroid gland appears mildly enlarged. The right lobe of  the thyroid measures 5.6 x 2.2 x 2.1 cm. The left lobe of the thyroid  measures 5.6 x 2.1 x 2 cm. The isthmus measures 0.9 cm. Background  thyroid parenchymal echogenicity is mildly heterogeneous. The thyroid  gland also appears diffusely hypervascular.     Individual thyroid nodules are measured as follows:   1. Hyperechoic nodule in the inferior right thyroid lobe measures 2 x  1.5 x 1.8 cm.  2. Hyperechoic nodule in the superior left thyroid lobe measures 0.8 x  0.5 x 0.4 cm.      IMPRESSION:   1. The thyroid gland is mildly enlarged and diffusely hypervascular.  2. Two hyperechoic thyroid nodules are noted, with the largest on the  right measuring 2 cm.      NUCLEAR MEDICINE THYROID UPTAKE AND SCAN 5/10/2017      HISTORY: Thyrotoxicosis, unspecified without thyrotoxic crisis or  storm. Thyrotoxicosis with diffuse goiter without thyrotoxic crisis or  storm. Decreased white blood cell count, unspecified.      COMPARISON: Thyroid ultrasound 5/4/2017.     TECHNIQUE: Patient was given 234 uCi of I-123 orally followed by  24-hour uptake and scan.     FINDINGS: Thyroid gland is enlarged with fairly homogeneous uptake  throughout most of the thyroid. There is an area of photopenia in the  lower pole right thyroid lobe corresponding to the nodule seen on  recent thyroid ultrasound. Findings are suspicious for a cold nodule.  24-hour uptake is 85% which is well above normal limits.     Normal range is 10-30% 24-hour uptake.         IMPRESSION: Enlarged thyroid gland with photopenic area corresponding  to the large right  lower lobe thyroid nodule on prior ultrasound.  Findings are concerning for a cold nodule which have an increased  incidence of malignancy. Follow-up percutaneous biopsy of this nodule  suggested for further assessment. 24-hour uptake is markedly increased  at 85% consistent with hyperthyroid state.      Copath Report 05/19/2017 10:40 AM 88   Patient Name: SHANDA JIMENEZ   MR#: [id number]   Specimen #: [id number]   Collected: 5/19/2017   Received: 5/19/2017   Reported: 5/23/2017 09:24   Ordering Phy(s): RISA SORIANO   Additional Phy(s): SUSAN RACHELE HAASE     For improved result formatting, select 'View Enhanced Report Format',   under Linked Documents section.     SPECIMEN/STAIN PROCESS:   FNA-thyroid, right nodule        Pap-Cyto x 7, Garcia's stain-cyto x 2        CYTOLOGIC INTERPRETATION:      FNA-thyroid, right nodule:   Benign   Consistent with a benign nodule (includes adenomatoid nodule, colloid   nodule, etc.)     The [place] Implied Risk of Malignancy and Recommended Clinical   Management:   Benign has a 0-3% risk of malignancy, recommended management is clinical   follow-up     Specimen Adequacy: Satisfactory for evaluation.     All pertinent notes, labs, and images personally reviewed by me.     A/P  Ms.Yunxia Jimenez is a 42 year old here for the treatment/follow up of Grave's disease.    Currently treated with Methimazole 10 mg bid x 2 months.  TFT's from yesterday pending.  WBC was slightly below normal but recent WBC improved and is now normal.   Adjust Methimazole dose based on pending lab results.    Treatment of Graves' hyperthyroidism consists of amelioration of symptoms with a beta-blocker and measures aimed at decreasing thyroid hormone synthesis: the administration of a thionamide, radioiodine ablation, or surgery.  Thionamides -- Thionamides (methimazole and propylthiouracial (PTU)).  Methimazole is preferred because of its longer duration of action (once daily dosing) and has lower incidence  of side effects.   PTU is preferred during pregnancy because of the potential teratogenic effects of methimazole.   The goal of therapy in Graves' hyperthyroidism is to be euthyroid within three to eight weeks. This can be followed by ablative therapy with radioiodine or surgery or by continuation of the drug for a prolonged period (usually one to two years) with the hope of attaining a permanent remission. If long-term medical therapy is chosen, the dose of methimazole is then tapered to a maintenance dose with the goal of maintaining a euthyroid state.  Both MMI and PTU can cause pruritus, rash, urticaria, arthralgias, arthritis, fever, abnormal taste sensation, nausea, or vomiting in up to 13 percent of patients.  If one drug is not tolerated, the other drug can be substituted, but up to 50 percent of patients have cross-sensitivity. The gastrointestinal side effects are dose-dependent. Thus, patients taking higher doses of MMI should be started on divided doses.  Agranulocytosis is a rare but serious complication of thionamide therapy, with a prevalence of 0.2 to 0.5 percent, and usually occurs within the first two months of treatment. The risk of agranulocytosis is higher for antithyroid drugs than for 20 other classes of drugs associated with this rare complication.  Hepatotoxicity is a rare complication of thionamide therapy. Serum aminotransferase concentrations increase transiently in up to one-third of patients taking PTU.    Radioiodine ablation -- Radioiodine is widely used for the treatment of Graves' hyperthyroidism. It is the therapy of choice in the [place].  Radioiodine therapy may be associated with an increased risk of the development or worsening of Graves' ophthalmopathy.  Radioiodine is administered as a capsule and induces extensive tissue damage, resulting in ablation of the thyroid within 6 to 18 weeks.   Approximately 20 percent of patients fail the first radioiodine treatment and  require a second or subsequent dose. These patients usually have more severe hyperthyroidism or larger goiters.  Suppressed TSH:  Differential for hyperthyroidism includes:  Graves' disease, thyroiditis, or autonomous hyperfunctioning nodule.  An uptake and scan of her thyroid gland will help differentiate the diagnosis.  In Graves' disease her uptake will be homogeneous and increased, in thyroiditis her uptake will be low, and in an autonomous hyperfunctioing nodule the uptake will be increased in a focal area.      Thyroid-stimulating immunoglobulins (TSI) can be detected in the majority of patients (77.8%) with Graves  disease.  It can be used to predict relapse or remission when using PTU/methimazole or radioiodine.  These assays have also been advocated for use in patients with subclinical Graves  hyperthyroidism or patients with unilateral ophthalmopathy.    Thyrotoxicosis associated with subacute thyroiditis is usually mild and transient.  The   patient lacks the physical findings of long-standing thyrotoxicosis. The goiter is typically  painful and low or absent 131I uptake.  Usually the erythrocyte sedimentation rate (ESR) and CRP are greatly elevated, and the leukocyte count may also be increased. Antibody titers are low or negative.    2.  Thyroid nodules.  Thyroid nodules are common and are frequently benign. Data suggest that the prevalence of palpable thyroid nodules is 3% to 7% in [place]; the prevalence is as high as 50% based on ultrasonography (US) or autopsy data. All patients with a palpable thyroid nodule, however, should undergo US examination. US-guided FNA (US-FNA) is recommended for nodules ?10 mm; US-FNA is suggested for nodules <10 mm only if clinical information or US features are suspicious.    Causes of thyroid Nodules: Benign (Multinodular goiter, Hashimoto s thyroiditis, Simple or hemorrhagic cysts, Follicular adenomas, Subacute thyroiditis) or Malignant(Papillary carcinoma,  Follicular carcinoma, Hürthle cell carcinoma, Medullary carcinoma, Anaplastic carcinoma, Primary thyroid lymphoma, or Metastatic malignant lesion).    MultiNodule:  The risk of cancer is not significantly higher in palpable solitary thyroid nodules than in multinodular lesions or in nodules in diffuse goiters. In multinodular thyroid glands, the cytologic sampling should be focused on lesions characterized by suspicious US features rather than on larger or clinically dominant nodules.    Cyst:  Most complex thyroid nodules with a dominant fluid component are benign. USFNA, however, should always be performed because the rare papillary thyroid carcinoma (PTC) can be cystic. An unsatisfactory (nondiagnostic) specimen usually results from a cystic nodule that yields few or no follicular cells. Reaspiration yields satisfactory results in 50% of cases.    Fine-Needle Aspiration Cytologic Diagnosis: 70% of FNA specimens are classified as benign; in addition, 5% are malignant, 10% are suspicious, and 10% to 20% are nondiagnostic or unsatisfactory. At surgical intervention, about 20% of such indeterminate/suspicious specimens are found to be malignant lesions.    Despite good initial technique, repeated biopsy, and US-FNA, approximately 5% of nodules still remain nondiagnostic. Such thyroid nodules should be surgically excised.    FNA biopsy of the 2.0 cm right thyroid nodule done 5/19/2017 was benign.  Plan to repeat thyroid/neck ultrasound in 6 months  -11/2017.      Labs ordered today:   No orders of the defined types were placed in this encounter.    Radiology/Consults ordered today: None    More than 50% of the time spent with Ms. Glass on counseling / coordinating her care.  Total face to face time was 20 minutes.      Follow-up:  3 months    Glory Sorto NP  Endocrinology  [facility]  CC: Haase, Susan Rachele

## 2017-07-11 NOTE — NURSING NOTE
"Chief Complaint   Patient presents with     Thyroid Problem     pt has gained over 10 lbs in 2 mo     Refill Request       Initial BP 90/47 (BP Location: Left arm, Patient Position: Chair, Cuff Size: Adult Regular)  Pulse 62  Temp 98.1  F (36.7  C) (Oral)  Resp 14  Wt 145 lb (65.8 kg)  BMI 26.52 kg/m2 Estimated body mass index is 26.52 kg/(m^2) as calculated from the following:    Height as of 5/26/17: 5' 2\" (1.575 m).    Weight as of this encounter: 145 lb (65.8 kg).  Medication Reconciliation: complete    "

## 2017-07-11 NOTE — MR AVS SNAPSHOT
After Visit Summary   7/11/2017    Tamica Glass    MRN: [id number]           Patient Information     Date Of Birth          1975        Visit Information        Provider Department      7/11/2017 7:45 AM Glory Sorto APRN CNP; [employer] [facility]        Today's Diagnoses     Thyroid nodule    -  1    Graves disease           Follow-ups after your visit        Follow-up notes from your care team     Return in about 3 months (around 10/11/2017).      Your next 10 appointments already scheduled     Sep 11, 2017  1:30 PM CDT   [facility] ARIELLE Flores CNP   [facility]    [address]  [facility] [address] [phone]              Future tests that were ordered for you today     Open Future Orders        Priority Expected Expires Ordered    TSH Routine  11/11/2017 7/11/2017    T4 FREE Routine  11/11/2017 7/11/2017    WBC count Routine  11/11/2017 7/11/2017    Hepatic panel Routine  11/11/2017 7/11/2017            Who to contact     If you have questions or need follow up information about today's clinic visit or your schedule please contact [facility] directly at [phone].  Normal or non-critical lab and imaging results will be communicated to you by MyChart, letter or phone within 4 business days after the clinic has received the results. If you do not hear from us within 7 days, please contact the clinic through [facility] or phone. If you have a critical or abnormal lab result, we will notify you by phone as soon as possible.  Submit refill requests through [employer] or call your pharmacy and they will forward the refill request to us. Please allow 3 business days for your refill to be completed.          Additional Information About Your Visit        MyChart Information     [employer]One lets you send messages to your doctor, view your test results, renew your  "prescriptions, schedule appointments and more. To sign up, go to [email or web address] . Click on \"Log in\" on the left side of the screen, which will take you to the Welcome page. Then click on \"Sign up Now\" on the right side of the page.     You will be asked to enter the access code listed below, as well as some personal information. Please follow the directions to create your username and password.     Your access code is: [email or web address]  Expires: 2017  9:04 AM     Your access code will  in 90 days. If you need help or a new code, please call your [facility] clinic or [phone].        Care EveryWhere ID     This is your Care EveryWhere ID. This could be used by other organizations to access your [facility] medical records  [id number]        Your Vitals Were     Pulse Temperature Respirations BMI (Body Mass Index)          62 98.1  F (36.7  C) (Oral) 14 26.52 kg/m2         Blood Pressure from Last 3 Encounters:   17 90/47   17 115/74   17 119/82    Weight from Last 3 Encounters:   17 65.8 kg (145 lb)   17 64 kg (141 lb)   17 62.8 kg (138 lb 6.4 oz)                 Where to get your medicines      These medications were sent to [facility] [address] [address]     Phone:  [phone]     methimazole 10 MG tablet          Primary Care Provider Office Phone # Fax #    Susan Rachele Haase, APRN [phone]       [facility] [address] [place] [id number]        Equal Access to Services     ROMAN ALONSO : Jay Robin, wapriyada erasto, qaybta kaalmada ne, nathalia peng. So [facility] [place] [phone].    ATENCIÓN: Si habla español, tiene a allison disposición servicios gratuitos de asistencia lingüística. Llame al [phone].    We comply with applicable federal civil rights laws and [place] laws. We do not " discriminate on the basis of race, color, national origin, age, disability sex, sexual orientation or gender identity.            Thank you!     Thank you for choosing [facility]  for your care. Our goal is always to provide you with excellent care. Hearing back from our patients is one way we can continue to improve our services. Please take a few minutes to complete the written survey that you may receive in the mail after your visit with us. Thank you!             Your Updated Medication List - Protect others around you: Learn how to safely use, store and throw away your medicines at www.disposemymeds.org.          This list is accurate as of: 7/11/17  8:38 AM.  Always use your most recent med list.                   Brand Name Dispense Instructions for use Diagnosis    methimazole 10 MG tablet    TAPAZOLE    60 tablet    Take 1 tablet (10 mg) by mouth 2 times daily    Graves disease       MULTIVITAMIN PO

## 2017-07-12 LAB
T4 FREE SERPL-MCNC: 0.52 NG/DL (ref 0.76–1.46)
TSH SERPL DL<=0.05 MIU/L-ACNC: 0.47 MU/L (ref 0.4–4)

## 2017-07-15 RX ORDER — METHIMAZOLE 10 MG/1
10 TABLET ORAL DAILY
Qty: 30 TABLET | Refills: 1 | Status: SHIPPED | OUTPATIENT
Start: 2017-07-15 | End: 2017-08-16

## 2017-07-15 NOTE — PROGRESS NOTES
Please call -  Yunxia,  Your thyroid levels are now a little too low.  Please decrease the Methimazole to one tablet (10 mg) once daily.  I would like you to schedule a follow up appointment in one month for a recheck.  Let me know if you have any questions.  Glory Sorto NP  Endocrinology

## 2017-07-19 ENCOUNTER — TELEPHONE (OUTPATIENT)
Dept: ENDOCRINOLOGY | Facility: CLINIC | Age: 42
End: 2017-07-19

## 2017-07-19 NOTE — TELEPHONE ENCOUNTER
Notes Recorded by Ginna Pierce RN on 7/19/2017 at 10:58 AM  Has appt for 9/11/7 with Glory already scheduled.  Called using   Services.  No answer.  V/M not set up so unable to L/M.  See telephone encounter.  Ginna Pierce RN    ------    Notes Recorded by Glory Sorto, ARIELLE CNP on 7/15/2017 at 9:36 AM  Please call -  Yuaiyana,  Your thyroid levels are now a little too low.  Please decrease the Methimazole to one tablet (10 mg) once daily.  I would like you to schedule a follow up appointment in one month for a recheck.  Let me know if you have any questions.  Glory Sorto NP  Endocrinology

## 2017-07-20 NOTE — TELEPHONE ENCOUNTER
Patient called back with an .  Patient notified of lab results.  Appointments for lab work and office visit with Glory Sorto scheduled.  Pao Gamino RN

## 2017-08-15 ENCOUNTER — DOCUMENTATION ONLY (OUTPATIENT)
Dept: LAB | Facility: CLINIC | Age: 42
End: 2017-08-15

## 2017-08-15 DIAGNOSIS — E04.1 THYROID NODULE: ICD-10-CM

## 2017-08-15 DIAGNOSIS — E05.00 GRAVES DISEASE: ICD-10-CM

## 2017-08-15 DIAGNOSIS — E05.90 HYPERTHYROIDISM: Primary | ICD-10-CM

## 2017-08-15 DIAGNOSIS — E05.90 HYPERTHYROIDISM: ICD-10-CM

## 2017-08-15 LAB
ALBUMIN SERPL-MCNC: 3.8 G/DL (ref 3.4–5)
ALP SERPL-CCNC: 89 U/L (ref 40–150)
ALT SERPL W P-5'-P-CCNC: 22 U/L (ref 0–50)
ANION GAP SERPL CALCULATED.3IONS-SCNC: 7 MMOL/L (ref 3–14)
AST SERPL W P-5'-P-CCNC: 15 U/L (ref 0–45)
BILIRUB SERPL-MCNC: 0.2 MG/DL (ref 0.2–1.3)
BUN SERPL-MCNC: 19 MG/DL (ref 7–30)
CALCIUM SERPL-MCNC: 8.9 MG/DL (ref 8.5–10.1)
CHLORIDE SERPL-SCNC: 108 MMOL/L (ref 94–109)
CO2 SERPL-SCNC: 24 MMOL/L (ref 20–32)
CREAT SERPL-MCNC: 0.53 MG/DL (ref 0.52–1.04)
GFR SERPL CREATININE-BSD FRML MDRD: >90 ML/MIN/1.7M2
GLUCOSE SERPL-MCNC: 104 MG/DL (ref 70–99)
POTASSIUM SERPL-SCNC: 4.2 MMOL/L (ref 3.4–5.3)
PROT SERPL-MCNC: 8 G/DL (ref 6.8–8.8)
SODIUM SERPL-SCNC: 139 MMOL/L (ref 133–144)
T4 FREE SERPL-MCNC: 0.77 NG/DL (ref 0.76–1.46)
TSH SERPL DL<=0.005 MIU/L-ACNC: 11.48 MU/L (ref 0.4–4)
WBC # BLD AUTO: 4.9 10E9/L (ref 4–11)

## 2017-08-15 PROCEDURE — 36415 COLL VENOUS BLD VENIPUNCTURE: CPT | Performed by: CLINICAL NURSE SPECIALIST

## 2017-08-15 PROCEDURE — 84439 ASSAY OF FREE THYROXINE: CPT | Performed by: CLINICAL NURSE SPECIALIST

## 2017-08-15 PROCEDURE — 85048 AUTOMATED LEUKOCYTE COUNT: CPT | Performed by: CLINICAL NURSE SPECIALIST

## 2017-08-15 PROCEDURE — 84443 ASSAY THYROID STIM HORMONE: CPT | Performed by: CLINICAL NURSE SPECIALIST

## 2017-08-15 PROCEDURE — 80053 COMPREHEN METABOLIC PANEL: CPT | Performed by: NURSE PRACTITIONER

## 2017-08-15 NOTE — PROGRESS NOTES
Patient was in today for lab only appointment and was wondering if you would add a Calcium level to her lab work. Please advise. Thank you

## 2017-08-15 NOTE — LETTER
Tamica Glass  [address] [facility] [address]        August 16, 2017          Hi Tamica,     Your glucose was slightly elevated at 104, your calcium was normal at 8.9.     Resulted Orders   Comprehensive metabolic panel   Result Value Ref Range    Sodium 139 133 - 144 mmol/L    Potassium 4.2 3.4 - 5.3 mmol/L    Chloride 108 94 - 109 mmol/L    Carbon Dioxide 24 20 - 32 mmol/L    Anion Gap 7 3 - 14 mmol/L    Glucose 104 (H) 70 - 99 mg/dL    Urea Nitrogen 19 7 - 30 mg/dL    Creatinine 0.53 0.52 - 1.04 mg/dL    GFR Estimate >90 >60 mL/min/1.7m2      Comment:      Non  GFR Calc    GFR Estimate If Black >90 >60 mL/min/1.7m2      Comment:       GFR Calc    Calcium 8.9 8.5 - 10.1 mg/dL    Bilirubin Total 0.2 0.2 - 1.3 mg/dL    Albumin 3.8 3.4 - 5.0 g/dL    Protein Total 8.0 6.8 - 8.8 g/dL    Alkaline Phosphatase 89 40 - 150 U/L    ALT 22 0 - 50 U/L    AST 15 0 - 45 U/L       If you have any questions or concerns, please call the clinic at the number listed above.       Sincerely,    Susan Haase, CNP

## 2017-08-16 RX ORDER — METHIMAZOLE 10 MG/1
5 TABLET ORAL DAILY
Qty: 30 TABLET | Refills: 1 | Status: SHIPPED | OUTPATIENT
Start: 2017-08-16 | End: 2017-08-18 | Stop reason: DRUGHIGH

## 2017-08-17 ENCOUNTER — TELEPHONE (OUTPATIENT)
Dept: ENDOCRINOLOGY | Facility: CLINIC | Age: 42
End: 2017-08-17

## 2017-08-17 NOTE — PROGRESS NOTES
Please call -  Yunxia,  Your thyroid levels are a little below normal.  Please decrease methimazole dose to 10 mg ONCE daily.  I would like you to follow up in one month because we are changing your dose of medication  Let me know if you have any questions.  Glory Sorto NP  Endocrinology

## 2017-08-17 NOTE — PROGRESS NOTES
Addendum to previously sent message:  Please call and verify that she decreased the Methimazole dose from 10 mg bid to 10 mg qd one month ago  If she did decreased as recommended, she needs to decreased further to 5 mg daily (previously sent message said to decreased to 10 mg/day before I realized we already decreased to this dose one month ago).  I would like her to follow up in clinic in one month.  Glory Sorto NP  Endocrinology

## 2017-08-18 ENCOUNTER — OFFICE VISIT (OUTPATIENT)
Dept: ENDOCRINOLOGY | Facility: CLINIC | Age: 42
End: 2017-08-18
Payer: COMMERCIAL

## 2017-08-18 VITALS
BODY MASS INDEX: 27.34 KG/M2 | DIASTOLIC BLOOD PRESSURE: 76 MMHG | RESPIRATION RATE: 20 BRPM | SYSTOLIC BLOOD PRESSURE: 106 MMHG | WEIGHT: 149.5 LBS | HEART RATE: 60 BPM

## 2017-08-18 DIAGNOSIS — E05.00 GRAVES DISEASE: Primary | ICD-10-CM

## 2017-08-18 PROCEDURE — 99213 OFFICE O/P EST LOW 20 MIN: CPT | Performed by: CLINICAL NURSE SPECIALIST

## 2017-08-18 RX ORDER — METHIMAZOLE 5 MG/1
5 TABLET ORAL DAILY
Qty: 30 TABLET | Refills: 3 | Status: SHIPPED | OUTPATIENT
Start: 2017-08-18 | End: 2017-10-10

## 2017-08-18 NOTE — PROGRESS NOTES
"Name: Sunil Glass  Seen at the request of Haase, Susan Rachele for Hyperthyroidism (Last seen 7/11/2017).    HPI:  Sunil Glass is a 42 year old female who presents for the evaluation of Hyperthyroidism.  She comes in today accompanied by an .  She previously noted increased anxiety, fatigue, some difficulty swallowing, and racing heart.  labs done for further evaluation were significant for elevated free T4 with suppressed TSH.  No past history of thyroid disease.  No known FH of thyroid dysfunction or thyroid cancer.    TSI antibodies were elevated.  Thyroid uptake scan showed elevated 24-hour uptake of 85%  Thyroid ultrasound showed two thyroid nodules - 2.0 cm right nodule and a 0.8 cm left nodule.  Uptake scan showed a photopenic area of uptake in the right lobe corresponding to the 2.0 cm right nodule seen on ultrasound.  FNA biopsy of the 2.0 cm right nodule done 5/19/2017 was benign.    She started Methimazole 10 mg bid (5/12/2017).  Methimazole dose was decreased to 10 mg qd one month ago due to elevated TSH.  She's here today for follow up.    Hyperthyroid symptoms resolved.    She's feeling tired, needing coffee in the evening.  Also noting continued weight gain, she wonders if it's ok to exercise.    History of radiation exposure: NO  History of thyroid dysfunction: NO  Changes in weight: Yes, weight gain  Vital Signs 5/26/2017 6/12/2017 7/11/2017 8/18/2017   Weight (LB) 138 lb 6.4 oz 141 lb 145 lb 149 lb 8 oz   Height 5' 2\"      BMI (Calculated) 25.37        PMH/PSH:  Past Medical History:   Diagnosis Date     Graves disease      No past surgical history on file.  Family Hx:  No family history on file.  Thyroid disease: No         DM2:          Autoimmune: DM1, SLE, RA, Vitiligo No    Social Hx:  Social History     Social History     Marital status: Single     Spouse name: N/A     Number of children: N/A     Years of education: N/A     Occupational History     Not on file.     Social History " Main Topics     Smoking status: Never Smoker     Smokeless tobacco: Never Used     Alcohol use No     Drug use: No     Sexual activity: No     Other Topics Concern     Not on file     Social History Narrative          MEDICATIONS:  has a current medication list which includes the following prescription(s): methimazole and multiple vitamins-minerals.    ROS   ROS: 10 point ROS neg other than the symptoms noted above in the HPI.    Physical Exam   VS: /76  Pulse 60  Resp 20  Wt 67.8 kg (149 lb 8 oz)  LMP 08/10/2017  Breastfeeding? No  BMI 27.34 kg/m2  GENERAL: AXOX3, NAD, well dressed, answering questions appropriately, appears stated age.  HEENT:  no exophthalmos, no proptosis, EOMI, no lig lag, no retraction  NECK:  Supple, thyroid gland diffusely enlarged, 2.0 cm right nodule palpable, nontender.  CV: RRR  LUNGS: Clear  ABDOMEN: soft, nontender, nondistended  EXTREMITIES: no edema, +pulses, no rashes, no lesions, no pretibial edema  NEUROLOGY: CN grossly intact, no fine tremor with outstretched hands.  MSK: grossly intact  SKIN: no rashes, no lesions    LABS:  TFTs:  !THYROID Latest Ref Rng & Units 8/15/2017 7/10/2017 6/7/2017   TSH 0.40 - 4.00 mU/L 11.48 (H) 0.47 <0.01 (L)   T4 FREE 0.76 - 1.46 ng/dL 0.77 0.52 (L) 1.50 (H)     !THYROID Latest Ref Rng & Units 4/19/2017   TSH 0.40 - 4.00 mU/L <0.01 (L)   T4 FREE 0.76 - 1.46 ng/dL 3.02 (H)     Component    Latest Ref Rng & Units 4/26/2017   Free T3    2.3 - 4.2 pg/mL 13.0 (H)     Component    Latest Ref Rng & Units 4/26/2017   Thyroglobulin Antibody    <40 IU/mL <20   Thyroid Peroxidase Antibody    <35 IU/mL 138 (H)   Thyroid Stim Immunog     5.1 (H)     CBC:  Component    Latest Ref Rng & Units 4/26/2017   WBC    4.0 - 11.0 10e9/L 4.0   RBC Count    3.8 - 5.2 10e12/L 4.50   Hemoglobin    11.7 - 15.7 g/dL 13.3   Hematocrit    35.0 - 47.0 % 39.8   MCV    78 - 100 fl 88   MCH    26.5 - 33.0 pg 29.6   MCHC    31.5 - 36.5 g/dL 33.4   RDW    10.0 - 15.0 % 11.7    Platelet Count    150 - 450 10e9/L 239     Component      Latest Ref Rng & Units 6/7/2017 7/10/2017   WBC      4.0 - 11.0 10e9/L 3.7 (L) 5.5     Component    Latest Ref Rng & Units 4/26/2017   Sed Rate    0 - 20 mm/h 16     LFTs:  !LIPID/HEPATIC Latest Ref Rng & Units 4/19/2017 6/7/2017   AST 0 - 45 U/L 20 19   ALT 0 - 50 U/L 37 47       ULTRASOUND THYROID 5/4/2017      HISTORY: Thyrotoxicosis.     COMPARISON: None.     FINDINGS: The thyroid gland appears mildly enlarged. The right lobe of  the thyroid measures 5.6 x 2.2 x 2.1 cm. The left lobe of the thyroid  measures 5.6 x 2.1 x 2 cm. The isthmus measures 0.9 cm. Background  thyroid parenchymal echogenicity is mildly heterogeneous. The thyroid  gland also appears diffusely hypervascular.     Individual thyroid nodules are measured as follows:   1. Hyperechoic nodule in the inferior right thyroid lobe measures 2 x  1.5 x 1.8 cm.  2. Hyperechoic nodule in the superior left thyroid lobe measures 0.8 x  0.5 x 0.4 cm.      IMPRESSION:   1. The thyroid gland is mildly enlarged and diffusely hypervascular.  2. Two hyperechoic thyroid nodules are noted, with the largest on the  right measuring 2 cm.      NUCLEAR MEDICINE THYROID UPTAKE AND SCAN 5/10/2017      HISTORY: Thyrotoxicosis, unspecified without thyrotoxic crisis or  storm. Thyrotoxicosis with diffuse goiter without thyrotoxic crisis or  storm. Decreased white blood cell count, unspecified.      COMPARISON: Thyroid ultrasound 5/4/2017.     TECHNIQUE: Patient was given 234 uCi of I-123 orally followed by  24-hour uptake and scan.     FINDINGS: Thyroid gland is enlarged with fairly homogeneous uptake  throughout most of the thyroid. There is an area of photopenia in the  lower pole right thyroid lobe corresponding to the nodule seen on  recent thyroid ultrasound. Findings are suspicious for a cold nodule.  24-hour uptake is 85% which is well above normal limits.     Normal range is 10-30% 24-hour  uptake.         IMPRESSION: Enlarged thyroid gland with photopenic area corresponding  to the large right lower lobe thyroid nodule on prior ultrasound.  Findings are concerning for a cold nodule which have an increased  incidence of malignancy. Follow-up percutaneous biopsy of this nodule  suggested for further assessment. 24-hour uptake is markedly increased  at 85% consistent with hyperthyroid state.      Copath Report 05/19/2017 10:40 AM 88   Patient Name: SHANDA JIMENEZ   MR#: [id number]   Specimen #: [id number]   Collected: 5/19/2017   Received: 5/19/2017   Reported: 5/23/2017 09:24   Ordering Phy(s): RISA SORIANO   Additional Phy(s): SUSAN RACHELE HAASE     For improved result formatting, select 'View Enhanced Report Format',   under Linked Documents section.     SPECIMEN/STAIN PROCESS:   FNA-thyroid, right nodule        Pap-Cyto x 7, Garcia's stain-cyto x 2        CYTOLOGIC INTERPRETATION:      FNA-thyroid, right nodule:   Benign   Consistent with a benign nodule (includes adenomatoid nodule, colloid   nodule, etc.)     The Wheeler Implied Risk of Malignancy and Recommended Clinical   Management:   Benign has a 0-3% risk of malignancy, recommended management is clinical   follow-up     Specimen Adequacy: Satisfactory for evaluation.     All pertinent notes, labs, and images personally reviewed by me.     A/P  Ms.Yunxia Jimenez is a 42 year old here for the treatment/follow up of Grave's disease.    Currently treated with Methimazole 10 mg qd.    This dose was decreased one month ago from 20 mg/day to 10/day due to low free T4.  Recent TFT's show elevated TSH with normal free T4 + hypothyroid symptoms.  Decrease Methimazole to 5 mg daily.   Recheck TFT's in 3-4 weeks, will adjust dose further if indicated.  WBC was slightly below normal but recent WBC improved and is now normal.     Treatment of Graves' hyperthyroidism consists of amelioration of symptoms with a beta-blocker and measures aimed at decreasing  thyroid hormone synthesis: the administration of a thionamide, radioiodine ablation, or surgery.  Thionamides -- Thionamides (methimazole and propylthiouracial (PTU)).  Methimazole is preferred because of its longer duration of action (once daily dosing) and has lower incidence of side effects.   PTU is preferred during pregnancy because of the potential teratogenic effects of methimazole.   The goal of therapy in Graves' hyperthyroidism is to be euthyroid within three to eight weeks. This can be followed by ablative therapy with radioiodine or surgery or by continuation of the drug for a prolonged period (usually one to two years) with the hope of attaining a permanent remission. If long-term medical therapy is chosen, the dose of methimazole is then tapered to a maintenance dose with the goal of maintaining a euthyroid state.  Both MMI and PTU can cause pruritus, rash, urticaria, arthralgias, arthritis, fever, abnormal taste sensation, nausea, or vomiting in up to 13 percent of patients.  If one drug is not tolerated, the other drug can be substituted, but up to 50 percent of patients have cross-sensitivity. The gastrointestinal side effects are dose-dependent. Thus, patients taking higher doses of MMI should be started on divided doses.  Agranulocytosis is a rare but serious complication of thionamide therapy, with a prevalence of 0.2 to 0.5 percent, and usually occurs within the first two months of treatment. The risk of agranulocytosis is higher for antithyroid drugs than for 20 other classes of drugs associated with this rare complication.  Hepatotoxicity is a rare complication of thionamide therapy. Serum aminotransferase concentrations increase transiently in up to one-third of patients taking PTU.    Radioiodine ablation -- Radioiodine is widely used for the treatment of Graves' hyperthyroidism. It is the therapy of choice in the [place].  Radioiodine therapy may be associated with an increased risk  of the development or worsening of Graves' ophthalmopathy.  Radioiodine is administered as a capsule and induces extensive tissue damage, resulting in ablation of the thyroid within 6 to 18 weeks.   Approximately 20 percent of patients fail the first radioiodine treatment and require a second or subsequent dose. These patients usually have more severe hyperthyroidism or larger goiters.  Suppressed TSH:  Differential for hyperthyroidism includes:  Graves' disease, thyroiditis, or autonomous hyperfunctioning nodule.  An uptake and scan of her thyroid gland will help differentiate the diagnosis.  In Graves' disease her uptake will be homogeneous and increased, in thyroiditis her uptake will be low, and in an autonomous hyperfunctioing nodule the uptake will be increased in a focal area.      Thyroid-stimulating immunoglobulins (TSI) can be detected in the majority of patients (77.8%) with Graves  disease.  It can be used to predict relapse or remission when using PTU/methimazole or radioiodine.  These assays have also been advocated for use in patients with subclinical Graves  hyperthyroidism or patients with unilateral ophthalmopathy.    Thyrotoxicosis associated with subacute thyroiditis is usually mild and transient.  The   patient lacks the physical findings of long-standing thyrotoxicosis. The goiter is typically  painful and low or absent 131I uptake.  Usually the erythrocyte sedimentation rate (ESR) and CRP are greatly elevated, and the leukocyte count may also be increased. Antibody titers are low or negative.    2.  Thyroid nodules.  Thyroid nodules are common and are frequently benign. Data suggest that the prevalence of palpable thyroid nodules is 3% to 7% in [place]; the prevalence is as high as 50% based on ultrasonography (US) or autopsy data. All patients with a palpable thyroid nodule, however, should undergo US examination. US-guided FNA (US-FNA) is recommended for nodules ?10 mm; US-FNA is  suggested for nodules <10 mm only if clinical information or US features are suspicious.    Causes of thyroid Nodules: Benign (Multinodular goiter, Hashimoto s thyroiditis, Simple or hemorrhagic cysts, Follicular adenomas, Subacute thyroiditis) or Malignant(Papillary carcinoma, Follicular carcinoma, Hürthle cell carcinoma, Medullary carcinoma, Anaplastic carcinoma, Primary thyroid lymphoma, or Metastatic malignant lesion).    MultiNodule:  The risk of cancer is not significantly higher in palpable solitary thyroid nodules than in multinodular lesions or in nodules in diffuse goiters. In multinodular thyroid glands, the cytologic sampling should be focused on lesions characterized by suspicious US features rather than on larger or clinically dominant nodules.    Cyst:  Most complex thyroid nodules with a dominant fluid component are benign. USFNA, however, should always be performed because the rare papillary thyroid carcinoma (PTC) can be cystic. An unsatisfactory (nondiagnostic) specimen usually results from a cystic nodule that yields few or no follicular cells. Reaspiration yields satisfactory results in 50% of cases.    Fine-Needle Aspiration Cytologic Diagnosis: 70% of FNA specimens are classified as benign; in addition, 5% are malignant, 10% are suspicious, and 10% to 20% are nondiagnostic or unsatisfactory. At surgical intervention, about 20% of such indeterminate/suspicious specimens are found to be malignant lesions.    Despite good initial technique, repeated biopsy, and US-FNA, approximately 5% of nodules still remain nondiagnostic. Such thyroid nodules should be surgically excised.    FNA biopsy of the 2.0 cm right thyroid nodule done 5/19/2017 was benign.  Plan to repeat thyroid/neck ultrasound in 6 months  -11/2017.      Labs ordered today:   No orders of the defined types were placed in this encounter.    Radiology/Consults ordered today: None    More than 50% of the time spent with Ms. Glass on  counseling / coordinating her care.  Total face to face time was 20 minutes.      Follow-up:  3 months    Glory Sorto NP  Endocrinology  [facility]  CC: Haase, Susan Rachele

## 2017-08-18 NOTE — MR AVS SNAPSHOT
"              After Visit Summary   8/18/2017    Tamica Glass    MRN: [id number]           Patient Information     Date Of Birth          1975        Visit Information        Provider Department      8/18/2017 9:45 AM Glory Sorto APRN CNP; [employer] [facility]        Today's Diagnoses     Graves disease    -  1      Care Instructions        Decrease methimazole to 5 mg once daily.  Make a lab only appointment to recheck thyroid levels in 3-4 weeks.    If the thyroid levels are still too low, we will decrease the dose further.    Follow up with me in 3 months.    Glory Sorto NP  Endocrinology            Follow-ups after your visit        Your next 10 appointments already scheduled     Sep 11, 2017  1:15 PM CDT   Return Visit with ARIELLE Flores CNP   [facility]    [address] [facility] [id number] [phone]              Who to contact     If you have questions or need follow up information about today's clinic visit or your schedule please contact [facility] directly at [phone].  Normal or non-critical lab and imaging results will be communicated to you by [employer], letter or phone within 4 business days after the clinic has received the results. If you do not hear from us within 7 days, please contact the clinic through [employer] or phone. If you have a critical or abnormal lab result, we will notify you by phone as soon as possible.  Submit refill requests through [employer] or call your pharmacy and they will forward the refill request to us. Please allow 3 business days for your refill to be completed.          Additional Information About Your Visit        [employer] Information     [employer] lets you send messages to your doctor, view your test results, renew your prescriptions, schedule appointments and more. To sign up, go to www.[facility]/[employer] . Click on \"Log " "in\" on the left side of the screen, which will take you to the Welcome page. Then click on \"Sign up Now\" on the right side of the page.     You will be asked to enter the access code listed below, as well as some personal information. Please follow the directions to create your username and password.     Your access code is: [email or web address]  Expires: 2017 10:24 AM     Your access code will  in 90 days. If you need help or a new code, please call your [facility] clinic or [phone].        Care EveryWhere ID     This is your Care EveryWhere ID. This could be used by other organizations to access your [facility] medical records  [id number]        Your Vitals Were     Pulse Respirations Last Period Breastfeeding? BMI (Body Mass Index)       60 20 08/10/2017 No 27.34 kg/m2        Blood Pressure from Last 3 Encounters:   17 106/76   17 90/47   17 115/74    Weight from Last 3 Encounters:   17 149 lb 8 oz (67.8 kg)   17 145 lb (65.8 kg)   17 141 lb (64 kg)              Today, you had the following     No orders found for display         Today's Medication Changes          These changes are accurate as of: 17 10:24 AM.  If you have any questions, ask your nurse or doctor.               These medicines have changed or have updated prescriptions.        Dose/Directions    methimazole 5 MG tablet   Commonly known as:  TAPAZOLE   This may have changed:  medication strength   Used for:  Graves disease   Changed by:  Glory Sorto APRN CNP        Dose:  5 mg   Take 1 tablet (5 mg) by mouth daily   Quantity:  30 tablet   Refills:  3            Where to get your medicines      These medications were sent to [facility] [address]  [address] [facility] [address]     Phone:  [phone]     methimazole 5 MG tablet                Primary Care Provider Office Phone # Fax #    Susan Rachele Haase, APRN [phone] " [phone]       [address] [facility] [address]        Equal Access to Services     CATHERINEJOSE M GAVIN : Hadii aad ku hadellyshaheed Deliciarachel, wapriyada lumehrdadadaha, qaybta kahilariada kellzahra, nathalia reddy priyankasarahy farrisnilda acunaaudrey peng. So [facility] [phone].    ATENCIÓN: Si habla español, tiene a allison disposición servicios gratuitos de asistencia lingüística. Llame al [phone].    We comply with applicable federal civil rights laws and [place] laws. We do not discriminate on the basis of race, color, national origin, age, disability sex, sexual orientation or gender identity.            Thank you!     Thank you for choosing [facility]  for your care. Our goal is always to provide you with excellent care. Hearing back from our patients is one way we can continue to improve our services. Please take a few minutes to complete the written survey that you may receive in the mail after your visit with us. Thank you!             Your Updated Medication List - Protect others around you: Learn how to safely use, store and throw away your medicines at www.disposemymeds.org.          This list is accurate as of: 8/18/17 10:24 AM.  Always use your most recent med list.                   Brand Name Dispense Instructions for use Diagnosis    methimazole 5 MG tablet    TAPAZOLE    30 tablet    Take 1 tablet (5 mg) by mouth daily    Graves disease       MULTIVITAMIN PO

## 2017-08-18 NOTE — PATIENT INSTRUCTIONS
Decrease methimazole to 5 mg once daily.  Make a lab only appointment to recheck thyroid levels in 3-4 weeks.    If the thyroid levels are still too low, we will decrease the dose further.    Follow up with me in 3 months.    Glory Sorto NP  Endocrinology

## 2017-08-18 NOTE — NURSING NOTE
"Chief Complaint   Patient presents with     Thyroid Problem     recheck lab results       Initial /76  Pulse 60  Resp 20  Wt 149 lb 8 oz (67.8 kg)  LMP 08/10/2017  Breastfeeding? No  BMI 27.34 kg/m2 Estimated body mass index is 27.34 kg/(m^2) as calculated from the following:    Height as of 5/26/17: 5' 2\" (1.575 m).    Weight as of this encounter: 149 lb 8 oz (67.8 kg).  Medication Reconciliation: complete   Annabella Davis CMA (AAMA)      "

## 2017-09-07 ENCOUNTER — DOCUMENTATION ONLY (OUTPATIENT)
Dept: LAB | Facility: CLINIC | Age: 42
End: 2017-09-07

## 2017-09-07 DIAGNOSIS — E05.90 HYPERTHYROIDISM: ICD-10-CM

## 2017-09-07 DIAGNOSIS — E05.00 GRAVES DISEASE: Primary | ICD-10-CM

## 2017-09-17 ENCOUNTER — HOSPITAL ENCOUNTER (EMERGENCY)
Facility: CLINIC | Age: 42
Discharge: HOME | End: 2017-09-17
Attending: EMERGENCY MEDICINE | Admitting: EMERGENCY MEDICINE
Payer: COMMERCIAL

## 2017-09-17 VITALS
TEMPERATURE: 98.1 F | RESPIRATION RATE: 16 BRPM | DIASTOLIC BLOOD PRESSURE: 93 MMHG | HEART RATE: 68 BPM | OXYGEN SATURATION: 99 % | SYSTOLIC BLOOD PRESSURE: 127 MMHG

## 2017-09-17 DIAGNOSIS — L03.116 CELLULITIS OF LEFT FOOT: ICD-10-CM

## 2017-09-17 LAB
BASOPHILS # BLD AUTO: 0 10E9/L (ref 0–0.2)
BASOPHILS NFR BLD AUTO: 0.2 %
DIFFERENTIAL METHOD BLD: NORMAL
EOSINOPHIL # BLD AUTO: 0.2 10E9/L (ref 0–0.7)
EOSINOPHIL NFR BLD AUTO: 4 %
ERYTHROCYTE [DISTWIDTH] IN BLOOD BY AUTOMATED COUNT: 12.5 % (ref 10–15)
HCT VFR BLD AUTO: 37.7 % (ref 35–47)
HGB BLD-MCNC: 12.6 G/DL (ref 11.7–15.7)
IMM GRANULOCYTES # BLD: 0 10E9/L (ref 0–0.4)
IMM GRANULOCYTES NFR BLD: 0.2 %
LYMPHOCYTES # BLD AUTO: 2 10E9/L (ref 0.8–5.3)
LYMPHOCYTES NFR BLD AUTO: 38.4 %
MCH RBC QN AUTO: 30.5 PG (ref 26.5–33)
MCHC RBC AUTO-ENTMCNC: 33.4 G/DL (ref 31.5–36.5)
MCV RBC AUTO: 91 FL (ref 78–100)
MONOCYTES # BLD AUTO: 0.4 10E9/L (ref 0–1.3)
MONOCYTES NFR BLD AUTO: 6.6 %
NEUTROPHILS # BLD AUTO: 2.7 10E9/L (ref 1.6–8.3)
NEUTROPHILS NFR BLD AUTO: 50.6 %
NRBC # BLD AUTO: 0 10*3/UL
NRBC BLD AUTO-RTO: 0 /100
PLATELET # BLD AUTO: 284 10E9/L (ref 150–450)
RBC # BLD AUTO: 4.13 10E12/L (ref 3.8–5.2)
WBC # BLD AUTO: 5.3 10E9/L (ref 4–11)

## 2017-09-17 PROCEDURE — 96375 TX/PRO/DX INJ NEW DRUG ADDON: CPT

## 2017-09-17 PROCEDURE — 96374 THER/PROPH/DIAG INJ IV PUSH: CPT

## 2017-09-17 PROCEDURE — 25000132 ZZH RX MED GY IP 250 OP 250 PS 637: Performed by: EMERGENCY MEDICINE

## 2017-09-17 PROCEDURE — 85025 COMPLETE CBC W/AUTO DIFF WBC: CPT | Performed by: EMERGENCY MEDICINE

## 2017-09-17 PROCEDURE — 25000128 H RX IP 250 OP 636: Performed by: EMERGENCY MEDICINE

## 2017-09-17 PROCEDURE — 99284 EMERGENCY DEPT VISIT MOD MDM: CPT | Mod: 25

## 2017-09-17 RX ORDER — CEPHALEXIN 500 MG/1
500 CAPSULE ORAL 4 TIMES DAILY
Qty: 28 CAPSULE | Refills: 0 | Status: SHIPPED | OUTPATIENT
Start: 2017-09-17 | End: 2017-09-24

## 2017-09-17 RX ORDER — CEFAZOLIN SODIUM 2 G/100ML
2 INJECTION, SOLUTION INTRAVENOUS ONCE
Status: COMPLETED | OUTPATIENT
Start: 2017-09-17 | End: 2017-09-17

## 2017-09-17 RX ORDER — CETIRIZINE HYDROCHLORIDE 10 MG/1
10 TABLET ORAL ONCE
Status: COMPLETED | OUTPATIENT
Start: 2017-09-17 | End: 2017-09-17

## 2017-09-17 RX ORDER — DEXAMETHASONE SODIUM PHOSPHATE 10 MG/ML
10 INJECTION, SOLUTION INTRAMUSCULAR; INTRAVENOUS ONCE
Status: COMPLETED | OUTPATIENT
Start: 2017-09-17 | End: 2017-09-17

## 2017-09-17 RX ADMIN — DEXAMETHASONE SODIUM PHOSPHATE 10 MG: 10 INJECTION, SOLUTION INTRAMUSCULAR; INTRAVENOUS at 14:28

## 2017-09-17 RX ADMIN — CETIRIZINE HYDROCHLORIDE 10 MG: 10 TABLET, FILM COATED ORAL at 14:28

## 2017-09-17 RX ADMIN — CEFAZOLIN SODIUM 2 G: 2 INJECTION, SOLUTION INTRAVENOUS at 14:28

## 2017-09-17 ASSESSMENT — ENCOUNTER SYMPTOMS
VOMITING: 0
CHILLS: 0
COLOR CHANGE: 1
MYALGIAS: 1
FEVER: 0
NAUSEA: 0

## 2017-09-17 NOTE — ED AVS SNAPSHOT
[facility] Department    [address]    [facility] [address]    Phone:  [phone]    Fax:  [phone]                                       Tamica Glass   MRN: [id number]    Department:  [facility] Emergency Department   Date of Visit:  9/17/2017           After Visit Summary Signature Page     I have received my discharge instructions, and my questions have been answered. I have discussed any challenges I see with this plan with the nurse or doctor.    ..........................................................................................................................................  Patient/Patient Representative Signature      ..........................................................................................................................................  Patient Representative Print Name and Relationship to Patient    ..................................................               ................................................  Date                                            Time    ..........................................................................................................................................  Reviewed by Signature/Title    ...................................................              ..............................................  Date                                                            Time

## 2017-09-17 NOTE — ED PROVIDER NOTES
History     Chief Complaint:  Foot Pain    History limited due to patient's language barrier. A  phone was provided.     ERICA Glass is a 42 year old female who presents to the emergency department today for evaluation of foot pain. The patient reports that she was at a barbecue 2 nights ago where she was possibly bitten by an insect on the outer aspect of her left foot. She reports progressively worsening pain, redness, itchiness and swelling since that time. The pain is worsened by weight-bearing. The patient denies any fevers, chills, nausea or vomiting.    Allergies:  No Known Drug Allergies      Medications:    methimazole (TAPAZOLE) 5 MG tablet    Past Medical History:    Graves disease    Past Surgical History:    History reviewed. No pertinent past surgical history.     Family History:    History reviewed. No pertinent family history.     Social History:  The patient was alone.  Smoking Status: Never Smoker  Smokeless Tobacco: Never Used  Alcohol Use: No   Marital Status:  Single     ROS limited secondary to patient's age.    Review of Systems   Constitutional: Negative for chills and fever.   Gastrointestinal: Negative for nausea and vomiting.   Musculoskeletal: Positive for myalgias (left foot).        Left foot swelling   Skin: Positive for color change (redness).        Itchiness to left foot   All other systems reviewed and are negative.    Physical Exam   First Vitals:  BP: 130/82  Heart Rate: 77  Temp: 98.1  F (36.7  C)  Resp: 18  SpO2: 100 %    Physical Exam   HENT:   Right Ear: External ear normal.   Left Ear: External ear normal.   Nose: Nose normal.   Eyes: Right eye exhibits no discharge. Left eye exhibits no discharge. No scleral icterus.   Cardiovascular: Intact distal pulses.    Pulmonary/Chest: Effort normal.   Speaking in full sentences   Musculoskeletal:        Feet:    No peripheral edema    Neurological:   Alert    No gross motor or sensory deficits   Skin: Skin is warm.    Psychiatric: She has a normal mood and affect. Judgment normal.   Nursing note and vitals reviewed.      Emergency Department Course     Laboratory:  Laboratory findings were communicated with the patient who voiced understanding of the findings.  CBC: AWNL. (WBC 5.3, HGB 12.6, )      Interventions:  1428: Ancef 2g IV  1428: Decadron 10mg IV  1428: Zyrtec 10mg PO     Emergency Department Course:  Nursing notes and vitals reviewed.  1357: I performed an exam of the patient as documented above.   IV was inserted and blood was drawn for laboratory testing, results above.   1507: Patient rechecked and updated.   I discussed the treatment plan with the patient. They expressed understanding of this plan and consented to discharge. They will be discharged home with instructions for care and follow up. In addition, the patient will return to the emergency department if their symptoms persist, worsen, if new symptoms arise or if there is any concern.  All questions were answered.   I personally reviewed the laboratory results with the Patient and answered all related questions prior to discharge.     Impression & Plan      Medical Decision Making:  Tamica Glass is a 42 year old female here with presentation concerning for left foot cellulitis. No signs of necrotizing fascitis, no clinical concern for DVT or other associated pathology. The patient received antibiotics here. She has no leukocytosis or fever. I will discharge home and have her continue antibiotics. There may be a component of histaminergic swelling and the patient was given antihistamine and dose of steroids here.     Critical Care time:  none    Diagnosis:    ICD-10-CM    1. Cellulitis of left foot L03.116      Disposition:  discharged to home with below prescription    Discharge Medications:  New Prescriptions    CEPHALEXIN (KEFLEX) 500 MG CAPSULE    Take 1 capsule (500 mg) by mouth 4 times daily for 7 days     Scribe Disclosure:  Maia KNAPP,  am serving as a scribe at 1:55 PM on 9/17/2017 to document services personally performed by Dontae Scruggs MD based on my observations and the provider's statements to me.    9/17/2017   [facility] EMERGENCY DEPARTMENT       Dontae Scruggs MD  09/17/17 [id number]

## 2017-09-17 NOTE — DISCHARGE INSTRUCTIONS
Please make an appointment to follow up with your primary care provider in 2-3 days          Discharge Instructions for Cellulitis  You have been diagnosed with cellulitis. This is an infection in the deepest layer of the skin. In some cases, the infection also affects the muscle. Cellulitis is caused by bacteria. The bacteria can enter the body through broken skin. This can happen with a cut, scratch, animal bite, or an insect bite that has been scratched. You may have been treated in the hospital with antibiotics and fluids. You will likely be given a prescription for antibiotics to take at home. This sheet will help you take care of yourself at home.  Home care  When you are home:    Take the prescribed antibiotic medicine you are given as directed until it is gone. Take it even if you feel better. It treats the infection and stops it from returning. Not taking all the medicine can make future infections hard to treat.    Keep the infected area clean.    When possible, raise the infected area above the level of your heart. This helps keep swelling down.    Talk with your healthcare provider if you are in pain. Ask what kind of over-the-counter medicine you can take for pain.    Apply clean bandages as advised.    Take your temperature once a day for a week.    Wash your hands often to prevent spreading the infection.  In the future, wash your hands before and after you touch cuts, scratches, or bandages. This will help prevent infection.   When to call your healthcare provider  Call your healthcare provider immediately if you have any of the following:    Difficulty or pain when moving the joints above or below the infected area    Discharge or pus draining from the area    Fever of 100.4 F (38 C) or higher, or as directed by your healthcare provider    Pain that gets worse in or around the infected     Redness that gets worse in or around the infected area, particularly if the area of redness expands to a wider  area    Shaking chills    Swelling of the infected area    Vomiting   Date Last Reviewed: 8/1/2016 2000-2017 [employer]. [address] [place] [address], [place] [address]. All rights reserved. This information is not intended as a substitute for professional medical care. Always follow your healthcare professional's instructions.

## 2017-09-17 NOTE — ED NOTES
Pt has redness and swelling of left foot.   Pt was at a barbque on Friday and Saturday AM, she noticed pain and some swelling and thought she was bitten by bug on outer aspect of foot.  Pain and swelling \have worsened.

## 2017-09-17 NOTE — ED AVS SNAPSHOT
[facility] Emergency Department    [address] [place] [address]    Phone:  [phone]    Fax:  [phone]                                       Tamica Glass   MRN: [id number]    Department:  [facility] Emergency Department   Date of Visit:  9/17/2017           Patient Information     Date Of Birth          1975        Your diagnoses for this visit were:     Cellulitis of left foot        You were seen by Dontae Scruggs MD.        Discharge Instructions       Please make an appointment to follow up with your primary care provider in 2-3 days          Discharge Instructions for Cellulitis  You have been diagnosed with cellulitis. This is an infection in the deepest layer of the skin. In some cases, the infection also affects the muscle. Cellulitis is caused by bacteria. The bacteria can enter the body through broken skin. This can happen with a cut, scratch, animal bite, or an insect bite that has been scratched. You may have been treated in the hospital with antibiotics and fluids. You will likely be given a prescription for antibiotics to take at home. This sheet will help you take care of yourself at home.  Home care  When you are home:    Take the prescribed antibiotic medicine you are given as directed until it is gone. Take it even if you feel better. It treats the infection and stops it from returning. Not taking all the medicine can make future infections hard to treat.    Keep the infected area clean.    When possible, raise the infected area above the level of your heart. This helps keep swelling down.    Talk with your healthcare provider if you are in pain. Ask what kind of over-the-counter medicine you can take for pain.    Apply clean bandages as advised.    Take your temperature once a day for a week.    Wash your hands often to prevent spreading the infection.  In the future, wash your hands before and after you touch cuts, scratches, or  bandages. This will help prevent infection.   When to call your healthcare provider  Call your healthcare provider immediately if you have any of the following:    Difficulty or pain when moving the joints above or below the infected area    Discharge or pus draining from the area    Fever of 100.4 F (38 C) or higher, or as directed by your healthcare provider    Pain that gets worse in or around the infected     Redness that gets worse in or around the infected area, particularly if the area of redness expands to a wider area    Shaking chills    Swelling of the infected area    Vomiting   Date Last Reviewed: 8/1/2016 2000-2017 [employer]. [address] [place] [address]. All rights reserved. This information is not intended as a substitute for professional medical care. Always follow your healthcare professional's instructions.          Future Appointments        Provider Department Dept Phone Center    9/22/2017 10:15 AM ARIELLE Flores CNP; [facility] [phone] [facility]      24 Hour Appointment Hotline       To make an appointment at any [facility], call [phone] ([phone]). If you don't have a family doctor or clinic, we will help you find one. [facility] are conveniently located to serve the needs of you and your family.             Review of your medicines      START taking        Dose / Directions Last dose taken    cephALEXin 500 MG capsule   Commonly known as:  KEFLEX   Dose:  500 mg   Quantity:  28 capsule        Take 1 capsule (500 mg) by mouth 4 times daily for 7 days   Refills:  0          Our records show that you are taking the medicines listed below. If these are incorrect, please call your family doctor or clinic.        Dose / Directions Last dose taken    methimazole 5 MG tablet   Commonly known as:  TAPAZOLE   Dose:  5 mg   Quantity:  30 tablet        Take 1 tablet (5 mg) by mouth daily   Refills:  3         MULTIVITAMIN PO        Refills:  0                Prescriptions were sent or printed at these locations (1 Prescription)                   Other Prescriptions                Printed at Department/Unit printer (1 of 1)         cephALEXin (KEFLEX) 500 MG capsule                Procedures and tests performed during your visit     CBC with platelets differential      Orders Needing Specimen Collection     None      Pending Results     No orders found from 9/15/2017 to 9/18/2017.            Pending Culture Results     No orders found from 9/15/2017 to 9/18/2017.            Pending Results Instructions     If you had any lab results that were not finalized at the time of your Discharge, you can call the ED Lab Result RN at [phone]. You will be contacted by this team for any positive Lab results or changes in treatment. The nurses are available 7 days a week from 10A to 6:30P.  You can leave a message 24 hours per day and they will return your call.        Test Results From Your Hospital Stay        9/17/2017  2:30 PM      Component Results     Component Value Ref Range & Units Status    WBC 5.3 4.0 - 11.0 10e9/L Final    RBC Count 4.13 3.8 - 5.2 10e12/L Final    Hemoglobin 12.6 11.7 - 15.7 g/dL Final    Hematocrit 37.7 35.0 - 47.0 % Final    MCV 91 78 - 100 fl Final    MCH 30.5 26.5 - 33.0 pg Final    MCHC 33.4 31.5 - 36.5 g/dL Final    RDW 12.5 10.0 - 15.0 % Final    Platelet Count 284 150 - 450 10e9/L Final    Diff Method Automated Method  Final    % Neutrophils 50.6 % Final    % Lymphocytes 38.4 % Final    % Monocytes 6.6 % Final    % Eosinophils 4.0 % Final    % Basophils 0.2 % Final    % Immature Granulocytes 0.2 % Final    Nucleated RBCs 0 0 /100 Final    Absolute Neutrophil 2.7 1.6 - 8.3 10e9/L Final    Absolute Lymphocytes 2.0 0.8 - 5.3 10e9/L Final    Absolute Monocytes 0.4 0.0 - 1.3 10e9/L Final    Absolute Eosinophils 0.2 0.0 - 0.7 10e9/L Final    Absolute Basophils 0.0 0.0 - 0.2 10e9/L Final    Abs  Immature Granulocytes 0.0 0 - 0.4 10e9/L Final    Absolute Nucleated RBC 0.0  Final                Clinical Quality Measure: Blood Pressure Screening     Your blood pressure was checked while you were in the emergency department today. The last reading we obtained was  BP: 130/82 . Please read the guidelines below about what these numbers mean and what you should do about them.  If your systolic blood pressure (the top number) is less than 120 and your diastolic blood pressure (the bottom number) is less than 80, then your blood pressure is normal. There is nothing more that you need to do about it.  If your systolic blood pressure (the top number) is 120-139 or your diastolic blood pressure (the bottom number) is 80-89, your blood pressure may be higher than it should be. You should have your blood pressure rechecked within a year by a primary care provider.  If your systolic blood pressure (the top number) is 140 or greater or your diastolic blood pressure (the bottom number) is 90 or greater, you may have high blood pressure. High blood pressure is treatable, but if left untreated over time it can put you at risk for heart attack, stroke, or kidney failure. You should have your blood pressure rechecked by a primary care provider within the next 4 weeks.  If your provider in the emergency department today gave you specific instructions to follow-up with your doctor or provider even sooner than that, you should follow that instruction and not wait for up to 4 weeks for your follow-up visit.        Thank you for choosing [facility]       Thank you for choosing [facility] for your care. Our goal is always to provide you with excellent care. Hearing back from our patients is one way we can continue to improve our services. Please take a few minutes to complete the written survey that you may receive in the mail after you visit with us. Thank you!        [employer] Information     [employer] lets you send messages to your  "doctor, view your test results, renew your prescriptions, schedule appointments and more. To sign up, go to [email or web address] . Click on \"Log in\" on the left side of the screen, which will take you to the Welcome page. Then click on \"Sign up Now\" on the right side of the page.     You will be asked to enter the access code listed below, as well as some personal information. Please follow the directions to create your username and password.     Your access code is: [email or web address]  Expires: 2017 10:24 AM     Your access code will  in 90 days. If you need help or a new code, please call your [facility] clinic or [phone].        Care EveryWhere ID     This is your Care EveryWhere ID. This could be used by other organizations to access your [facility] medical records  [id number]        Equal Access to Services     [facility]: Hadrufino Robin, chuy maurer, joselyn olivia, nathalia bartlett . So [facility] [phone].    ATENCIÓN: Si habla español, tiene a allison disposición servicios gratuitos de asistencia lingüística. Llame al [phone].    We comply with applicable federal civil rights laws and [place] laws. We do not discriminate on the basis of race, color, national origin, age, disability sex, sexual orientation or gender identity.            After Visit Summary       This is your record. Keep this with you and show to your community pharmacist(s) and doctor(s) at your next visit.                  "

## 2017-09-29 ENCOUNTER — DOCUMENTATION ONLY (OUTPATIENT)
Dept: LAB | Facility: CLINIC | Age: 42
End: 2017-09-29

## 2017-09-29 DIAGNOSIS — E05.90 HYPERTHYROIDISM: ICD-10-CM

## 2017-09-29 DIAGNOSIS — E05.00 GRAVES DISEASE: ICD-10-CM

## 2017-09-29 LAB — WBC # BLD AUTO: 7.1 10E9/L (ref 4–11)

## 2017-09-29 PROCEDURE — 80076 HEPATIC FUNCTION PANEL: CPT | Performed by: CLINICAL NURSE SPECIALIST

## 2017-09-29 PROCEDURE — 84439 ASSAY OF FREE THYROXINE: CPT | Performed by: CLINICAL NURSE SPECIALIST

## 2017-09-29 PROCEDURE — 84443 ASSAY THYROID STIM HORMONE: CPT | Performed by: CLINICAL NURSE SPECIALIST

## 2017-09-29 PROCEDURE — 36415 COLL VENOUS BLD VENIPUNCTURE: CPT | Performed by: CLINICAL NURSE SPECIALIST

## 2017-09-29 PROCEDURE — 85048 AUTOMATED LEUKOCYTE COUNT: CPT | Performed by: CLINICAL NURSE SPECIALIST

## 2017-09-29 NOTE — PROGRESS NOTES
Patient was in today for a lab only appointment. She was asking about adding a Calcium level to her lab work. She had said her previous calcium level was high- however I am not seeing that level in her chart. Please advise

## 2017-09-30 LAB
ALBUMIN SERPL-MCNC: 3.8 G/DL (ref 3.4–5)
ALP SERPL-CCNC: 90 U/L (ref 40–150)
ALT SERPL W P-5'-P-CCNC: 25 U/L (ref 0–50)
AST SERPL W P-5'-P-CCNC: 13 U/L (ref 0–45)
BILIRUB DIRECT SERPL-MCNC: <0.1 MG/DL (ref 0–0.2)
BILIRUB SERPL-MCNC: 0.2 MG/DL (ref 0.2–1.3)
PROT SERPL-MCNC: 8 G/DL (ref 6.8–8.8)
T4 FREE SERPL-MCNC: 1.04 NG/DL (ref 0.76–1.46)
TSH SERPL DL<=0.005 MIU/L-ACNC: 1.86 MU/L (ref 0.4–4)

## 2017-10-02 NOTE — PROGRESS NOTES
Will address with the patient at her upcoming appointment.   No previously abnormal calcium noted in our records.  No need to add a calcium at this time.  Glory Sorto NP  Endocrinology

## 2017-10-10 ENCOUNTER — OFFICE VISIT (OUTPATIENT)
Dept: ENDOCRINOLOGY | Facility: CLINIC | Age: 42
End: 2017-10-10
Payer: COMMERCIAL

## 2017-10-10 VITALS
BODY MASS INDEX: 27.98 KG/M2 | TEMPERATURE: 98.1 F | DIASTOLIC BLOOD PRESSURE: 76 MMHG | HEART RATE: 75 BPM | RESPIRATION RATE: 16 BRPM | SYSTOLIC BLOOD PRESSURE: 115 MMHG | WEIGHT: 153 LBS

## 2017-10-10 DIAGNOSIS — R79.89 LOW VITAMIN D LEVEL: ICD-10-CM

## 2017-10-10 DIAGNOSIS — E05.00 GRAVES DISEASE: Primary | ICD-10-CM

## 2017-10-10 PROCEDURE — 99214 OFFICE O/P EST MOD 30 MIN: CPT | Performed by: CLINICAL NURSE SPECIALIST

## 2017-10-10 RX ORDER — METHIMAZOLE 5 MG/1
5 TABLET ORAL DAILY
Qty: 90 TABLET | Refills: 1 | Status: SHIPPED | OUTPATIENT
Start: 2017-10-10 | End: 2017-12-22

## 2017-10-10 NOTE — PATIENT INSTRUCTIONS
Continue Methimazole 5 mg once daily.    Make a lab only appointment in 1 month to recheck thyroid levels, vitamin D, and calcium.

## 2017-10-10 NOTE — MR AVS SNAPSHOT
After Visit Summary   10/10/2017    Tamica Glass    MRN: [id number]           Patient Information     Date Of Birth          1975        Visit Information        Provider Department      10/10/2017 1:00 PM Glory Sorto APRN CNP; [employer] [facility]        Today's Diagnoses     Graves disease    -  1    Low vitamin D level          Care Instructions        Continue Methimazole 5 mg once daily.    Make a lab only appointment in 1 month to recheck thyroid levels, vitamin D, and calcium.              Follow-ups after your visit        Follow-up notes from your care team     Return in about 3 months (around 1/10/2018).      Your next 10 appointments already scheduled     Nov 07, 2017 10:30 AM CST   LAB with CR LAB   [facility]    [id number] [place] [address] [phone]           Patient must bring picture ID. Patient should be prepared to give a urine specimen  Please do not eat 10-12 hours before your appointment if you are coming in fasting for labs on lipids, cholesterol, or glucose (sugar). Pregnant women should follow their Care Team instructions. Water with medications is okay. Do not drink coffee or other fluids. If you have concerns about taking  your medications, please ask at office or if scheduling via YouTube, send a message by clicking on Secure Messaging, Message Your Care Team.            Dec 29, 2017 10:30 AM CST   LAB with CR LAB   [facility]    [address] [facility] [address]   [phone]           Patient must bring picture ID. Patient should be prepared to give a urine specimen  Please do not eat 10-12 hours before your appointment if you are coming in fasting for labs on lipids, cholesterol, or glucose (sugar). Pregnant women should follow their Care Team instructions. Water with  "medications is okay. Do not drink coffee or other fluids. If you have concerns about taking  your medications, please ask at office or if scheduling via [employer], send a message by clicking on Secure Messaging, Message Your Care Team.            Jan 09, 2018 11:00 AM CST   Return Visit with ARIELLE Flores CNP   [facility] ([facility]    [address]  [facility] [id number] [phone]              Future tests that were ordered for you today     Open Future Orders        Priority Expected Expires Ordered    Hepatic panel Routine  10/10/2018 10/10/2017    Vitamin D Deficiency Routine  10/10/2018 10/10/2017    **Basic metabolic panel FUTURE anytime Routine 10/10/2017 10/10/2018 10/10/2017    TSH Routine  12/10/2017 10/10/2017    T4 FREE Routine  12/10/2017 10/10/2017            Who to contact     If you have questions or need follow up information about today's clinic visit or your schedule please contact [facility] directly at [phone].  Normal or non-critical lab and imaging results will be communicated to you by Mediasmarthart, letter or phone within 4 business days after the clinic has received the results. If you do not hear from us within 7 days, please contact the clinic through [employer] or phone. If you have a critical or abnormal lab result, we will notify you by phone as soon as possible.  Submit refill requests through [employer] or call your pharmacy and they will forward the refill request to us. Please allow 3 business days for your refill to be completed.          Additional Information About Your Visit        [employer] Information     [employer] lets you send messages to your doctor, view your test results, renew your prescriptions, schedule appointments and more. To sign up, go to [email or web address] . Click on \"Log in\" on the left side of the screen, which will take you to the Welcome page. Then click on \"Sign up Now\" on the " right side of the page.     You will be asked to enter the access code listed below, as well as some personal information. Please follow the directions to create your username and password.     Your access code is: [email or web address]  Expires: 2017 10:24 AM     Your access code will  in 90 days. If you need help or a new code, please call your [place] clinic or [phone].        Care EveryWhere ID     This is your Care EveryWhere ID. This could be used by other organizations to access your [facility] medical records  [id number]        Your Vitals Were     Pulse Temperature Respirations Last Period BMI (Body Mass Index)       75 98.1  F (36.7  C) (Oral) 16 09/10/2017 27.98 kg/m2        Blood Pressure from Last 3 Encounters:   10/10/17 115/76   17 (!) 127/93   17 106/76    Weight from Last 3 Encounters:   10/10/17 69.4 kg (153 lb)   17 67.8 kg (149 lb 8 oz)   17 65.8 kg (145 lb)                 Where to get your medicines      These medications were sent to [facility] [address]  [address] [facility] [address]     Phone:  [phone]     methimazole 5 MG tablet          Primary Care Provider    Physician No Ref-Primary       NO REF-PRIMARY PHYSICIAN        Equal Access to Services     [facility]: Hadii aad ku hadasho Soomaali, waaxda luqadaha, qaybta kaalmada adeegyada, nathalia bartlett . So [facility] [phone].    ATENCIÓN: Si habla español, tiene a allison disposición servicios gratuitos de asistencia lingüística. Llame al [phone].    We comply with applicable federal civil rights laws and [place] laws. We do not discriminate on the basis of race, color, national origin, age, disability, sex, sexual orientation, or gender identity.            Thank you!     Thank you for choosing [facility]  for your care. Our goal is always to provide you with excellent care. Hearing back from our  patients is one way we can continue to improve our services. Please take a few minutes to complete the written survey that you may receive in the mail after your visit with us. Thank you!             Your Updated Medication List - Protect others around you: Learn how to safely use, store and throw away your medicines at www.disposemymeds.org.          This list is accurate as of: 10/10/17  1:56 PM.  Always use your most recent med list.                   Brand Name Dispense Instructions for use Diagnosis    methimazole 5 MG tablet    TAPAZOLE    90 tablet    Take 1 tablet (5 mg) by mouth daily    Graves disease       MULTIVITAMIN PO

## 2017-10-10 NOTE — PROGRESS NOTES
"Name: Sunil Glass  Seen at the request of Haase, Susan Rachele for Hyperthyroidism (Last seen 8/18/2017).    HPI:  Sunil Glass is a 42 year old female who presents for the evaluation of Hyperthyroidism.  She comes in today accompanied by an .  She previously noted increased anxiety, fatigue, some difficulty swallowing, and racing heart.  labs done for further evaluation were significant for elevated free T4 with suppressed TSH.  No past history of thyroid disease.  No known FH of thyroid dysfunction or thyroid cancer.    TSI antibodies were elevated.  Thyroid uptake scan showed elevated 24-hour uptake of 85%  Thyroid ultrasound showed two thyroid nodules - 2.0 cm right nodule and a 0.8 cm left nodule.  Uptake scan showed a photopenic area of uptake in the right lobe corresponding to the 2.0 cm right nodule seen on ultrasound.  FNA biopsy of the 2.0 cm right nodule done 5/19/2017 was benign.    She started Methimazole 10 mg bid (5/12/2017).  Methimazole dose was decreased to 10 mg qd 7/2017 due to elevated TSH.  Methimazole dose was further decreased to 5 mg/day 8/2017.  She's here today for follow up.    C/o insomnia, increased hair loss, fatigue, continued weight gain x 3 weeks.  States symptoms feel like when her thyroid levels were too high.  She increased the Methimazole dose to 10 mg/day the past 3 days - not sure if she feels any better.    History of radiation exposure: NO  History of thyroid dysfunction: NO  Changes in weight: Yes, weight gain  Vital Signs 5/26/2017 6/12/2017 7/11/2017 8/18/2017 10/10/2017   Weight (LB) 138 lb 6.4 oz 141 lb 145 lb 149 lb 8 oz 153 lb   Height 5' 2\"       BMI (Calculated) 25.37         PMH/PSH:  Past Medical History:   Diagnosis Date     Graves disease      History reviewed. No pertinent surgical history.  Family Hx:  History reviewed. No pertinent family history.  Thyroid disease: No         DM2:          Autoimmune: DM1, SLE, RA, Vitiligo No    Social Hx:  Social " History     Social History     Marital status: Single     Spouse name: N/A     Number of children: N/A     Years of education: N/A     Occupational History     Not on file.     Social History Main Topics     Smoking status: Never Smoker     Smokeless tobacco: Never Used     Alcohol use No     Drug use: No     Sexual activity: No     Other Topics Concern     Not on file     Social History Narrative          MEDICATIONS:  has a current medication list which includes the following prescription(s): methimazole and multiple vitamins-minerals.    ROS   ROS: 10 point ROS neg other than the symptoms noted above in the HPI.    Physical Exam   VS: /76 (BP Location: Left arm, Patient Position: Chair, Cuff Size: Adult Regular)  Pulse 75  Temp 98.1  F (36.7  C) (Oral)  Resp 16  Wt 69.4 kg (153 lb)  LMP 09/10/2017  BMI 27.98 kg/m2  GENERAL: AXOX3, NAD, well dressed, answering questions appropriately, appears stated age.  HEENT:  no exophthalmos, no proptosis, EOMI, no lig lag, no retraction  NECK:  Supple, thyroid gland diffusely enlarged, 2.0 cm right nodule palpable, nontender.  CV: RRR  LUNGS: Clear  ABDOMEN: soft, nontender, nondistended  EXTREMITIES: no edema, +pulses, no rashes, no lesions, no pretibial edema  NEUROLOGY: CN grossly intact, no fine tremor with outstretched hands.  MSK: grossly intact  SKIN: no rashes, no lesions    LABS:  TFTs:  !THYROID Latest Ref Rng & Units 9/29/2017 8/15/2017 7/10/2017   TSH 0.40 - 4.00 mU/L 1.86 11.48 (H) 0.47   T4 FREE 0.76 - 1.46 ng/dL 1.04 0.77 0.52 (L)     !THYROID Latest Ref Rng & Units 6/7/2017 4/19/2017   TSH 0.40 - 4.00 mU/L <0.01 (L) <0.01 (L)   T4 FREE 0.76 - 1.46 ng/dL 1.50 (H) 3.02 (H)       Component    Latest Ref Rng & Units 4/26/2017   Free T3    2.3 - 4.2 pg/mL 13.0 (H)     Component    Latest Ref Rng & Units 4/26/2017   Thyroglobulin Antibody    <40 IU/mL <20   Thyroid Peroxidase Antibody    <35 IU/mL 138 (H)   Thyroid Stim Immunog     5.1 (H)      CBC:  Component    Latest Ref Rng & Units 4/26/2017   WBC    4.0 - 11.0 10e9/L 4.0   RBC Count    3.8 - 5.2 10e12/L 4.50   Hemoglobin    11.7 - 15.7 g/dL 13.3   Hematocrit    35.0 - 47.0 % 39.8   MCV    78 - 100 fl 88   MCH    26.5 - 33.0 pg 29.6   MCHC    31.5 - 36.5 g/dL 33.4   RDW    10.0 - 15.0 % 11.7   Platelet Count    150 - 450 10e9/L 239     Component      Latest Ref Rng & Units 9/29/2017   WBC      4.0 - 11.0 10e9/L 7.1       Component    Latest Ref Rng & Units 4/26/2017   Sed Rate    0 - 20 mm/h 16     LFTs:  !LIPID/HEPATIC Latest Ref Rng & Units 9/29/2017   AST 0 - 45 U/L 13   ALT 0 - 50 U/L 25       ULTRASOUND THYROID       HISTORY: Thyrotoxicosis.     COMPARISON: None.   .  FINDINGS: The thyroid gland appears mildly enlarged. The right lobe of  the thyroid measures 5.6 x 2.2 x 2.1 cm. The left lobe of the thyroid  measures 5.6 x 2.1 x 2 cm. The isthmus measures 0.9 cm. Background  thyroid parenchymal echogenicity is mildly heterogeneous. The thyroid  gland also appears diffusely hypervascular.     Individual thyroid nodules are measured as follows:   1. Hyperechoic nodule in the inferior right thyroid lobe measures 2 x  1.5 x 1.8 cm.  2. Hyperechoic nodule in the superior left thyroid lobe measures 0.8 x  0.5 x 0.4 cm.      IMPRESSION:   1. The thyroid gland is mildly enlarged and diffusely hypervascular.  2. Two hyperechoic thyroid nodules are noted, with the largest on the  right measuring 2 cm.      NUCLEAR MEDICINE THYROID UPTAKE AND SCAN 5/10/2017      HISTORY: Thyrotoxicosis, unspecified without thyrotoxic crisis or  storm. Thyrotoxicosis with diffuse goiter without thyrotoxic crisis or  storm. Decreased white blood cell count, unspecified.      COMPARISON: Thyroid ultrasound 5/4/2017.     TECHNIQUE: Patient was given 234 uCi of I-123 orally followed by  24-hour uptake and scan.     FINDINGS: Thyroid gland is enlarged with fairly homogeneous uptake  throughout most of the thyroid. There is an  area of photopenia in the  lower pole right thyroid lobe corresponding to the nodule seen on  recent thyroid ultrasound. Findings are suspicious for a cold nodule.  24-hour uptake is 85% which is well above normal limits.     Normal range is 10-30% 24-hour uptake.         IMPRESSION: Enlarged thyroid gland with photopenic area corresponding  to the large right lower lobe thyroid nodule on prior ultrasound.  Findings are concerning for a cold nodule which have an increased  incidence of malignancy. Follow-up percutaneous biopsy of this nodule  suggested for further assessment. 24-hour uptake is markedly increased  at 85% consistent with hyperthyroid state.      Copath Report 05/19/2017 10:40 AM 88   Patient Name: SHANDA JIMENEZ   MR#: [id number]   Specimen #: [id number]   Collected: 5/19/2017   Received: 5/19/2017   Reported: 5/23/2017 09:24   Ordering Phy(s): RISA SORIANO   Additional Phy(s): SUSAN RACHELE HAASE     For improved result formatting, select 'View Enhanced Report Format',   under Linked Documents section.     SPECIMEN/STAIN PROCESS:   FNA-thyroid, right nodule        Pap-Cyto x 7, Garcia's stain-cyto x 2        CYTOLOGIC INTERPRETATION:      FNA-thyroid, right nodule:   Benign   Consistent with a benign nodule (includes adenomatoid nodule, colloid   nodule, etc.)     The [place] Implied Risk of Malignancy and Recommended Clinical   Management:   Benign has a 0-3% risk of malignancy, recommended management is clinical   follow-up     Specimen Adequacy: Satisfactory for evaluation.     All pertinent notes, labs, and images personally reviewed by me.     A/P  Ms.Yunxia Jimenez is a 42 year old here for the treatment/follow up of Grave's disease.    Currently treated with Methimazole 5 mg qd.    Noting increased insomnia, fatigue, hair loss, continued weight gain.  TFT's done prior to this appointment were wnls.  Continue current dose of Methimazole.  Repeat TFT's in one month due to symptoms.  F/u in 3  months with labs priorl    Treatment of Graves' hyperthyroidism consists of amelioration of symptoms with a beta-blocker and measures aimed at decreasing thyroid hormone synthesis: the administration of a thionamide, radioiodine ablation, or surgery.  Thionamides -- Thionamides (methimazole and propylthiouracial (PTU)).  Methimazole is preferred because of its longer duration of action (once daily dosing) and has lower incidence of side effects.   PTU is preferred during pregnancy because of the potential teratogenic effects of methimazole.   The goal of therapy in Graves' hyperthyroidism is to be euthyroid within three to eight weeks. This can be followed by ablative therapy with radioiodine or surgery or by continuation of the drug for a prolonged period (usually one to two years) with the hope of attaining a permanent remission. If long-term medical therapy is chosen, the dose of methimazole is then tapered to a maintenance dose with the goal of maintaining a euthyroid state.  Both MMI and PTU can cause pruritus, rash, urticaria, arthralgias, arthritis, fever, abnormal taste sensation, nausea, or vomiting in up to 13 percent of patients.  If one drug is not tolerated, the other drug can be substituted, but up to 50 percent of patients have cross-sensitivity. The gastrointestinal side effects are dose-dependent. Thus, patients taking higher doses of MMI should be started on divided doses.  Agranulocytosis is a rare but serious complication of thionamide therapy, with a prevalence of 0.2 to 0.5 percent, and usually occurs within the first two months of treatment. The risk of agranulocytosis is higher for antithyroid drugs than for 20 other classes of drugs associated with this rare complication.  Hepatotoxicity is a rare complication of thionamide therapy. Serum aminotransferase concentrations increase transiently in up to one-third of patients taking PTU.    Radioiodine ablation -- Radioiodine is widely used for  the treatment of Graves' hyperthyroidism. It is the therapy of choice in the [place].  Radioiodine therapy may be associated with an increased risk of the development or worsening of Graves' ophthalmopathy.  Radioiodine is administered as a capsule and induces extensive tissue damage, resulting in ablation of the thyroid within 6 to 18 weeks.   Approximately 20 percent of patients fail the first radioiodine treatment and require a second or subsequent dose. These patients usually have more severe hyperthyroidism or larger goiters.  Suppressed TSH:  Differential for hyperthyroidism includes:  Graves' disease, thyroiditis, or autonomous hyperfunctioning nodule.  An uptake and scan of her thyroid gland will help differentiate the diagnosis.  In Graves' disease her uptake will be homogeneous and increased, in thyroiditis her uptake will be low, and in an autonomous hyperfunctioing nodule the uptake will be increased in a focal area.      Thyroid-stimulating immunoglobulins (TSI) can be detected in the majority of patients (77.8%) with Graves  disease.  It can be used to predict relapse or remission when using PTU/methimazole or radioiodine.  These assays have also been advocated for use in patients with subclinical Graves  hyperthyroidism or patients with unilateral ophthalmopathy.    Thyrotoxicosis associated with subacute thyroiditis is usually mild and transient.  The   patient lacks the physical findings of long-standing thyrotoxicosis. The goiter is typically  painful and low or absent 131I uptake.  Usually the erythrocyte sedimentation rate (ESR) and CRP are greatly elevated, and the leukocyte count may also be increased. Antibody titers are low or negative.    2.  Thyroid nodules.  Thyroid nodules are common and are frequently benign. Data suggest that the prevalence of palpable thyroid nodules is 3% to 7% in [place]; the prevalence is as high as 50% based on ultrasonography (US) or autopsy data. All  patients with a palpable thyroid nodule, however, should undergo US examination. US-guided FNA (US-FNA) is recommended for nodules ?10 mm; US-FNA is suggested for nodules <10 mm only if clinical information or US features are suspicious.    Causes of thyroid Nodules: Benign (Multinodular goiter, Hashimoto s thyroiditis, Simple or hemorrhagic cysts, Follicular adenomas, Subacute thyroiditis) or Malignant(Papillary carcinoma, Follicular carcinoma, Hürthle cell carcinoma, Medullary carcinoma, Anaplastic carcinoma, Primary thyroid lymphoma, or Metastatic malignant lesion).    MultiNodule:  The risk of cancer is not significantly higher in palpable solitary thyroid nodules than in multinodular lesions or in nodules in diffuse goiters. In multinodular thyroid glands, the cytologic sampling should be focused on lesions characterized by suspicious US features rather than on larger or clinically dominant nodules.    Cyst:  Most complex thyroid nodules with a dominant fluid component are benign. USFNA, however, should always be performed because the rare papillary thyroid carcinoma (PTC) can be cystic. An unsatisfactory (nondiagnostic) specimen usually results from a cystic nodule that yields few or no follicular cells. Reaspiration yields satisfactory results in 50% of cases.    Fine-Needle Aspiration Cytologic Diagnosis: 70% of FNA specimens are classified as benign; in addition, 5% are malignant, 10% are suspicious, and 10% to 20% are nondiagnostic or unsatisfactory. At surgical intervention, about 20% of such indeterminate/suspicious specimens are found to be malignant lesions.    Despite good initial technique, repeated biopsy, and US-FNA, approximately 5% of nodules still remain nondiagnostic. Such thyroid nodules should be surgically excised.    FNA biopsy of the 2.0 cm right thyroid nodule done 5/19/2017 was benign.  Plan to repeat thyroid/neck ultrasound in 6 months  - will place order at next follow up visit  1/2018.      Previous D level was low.  Does not take any supplemental D.  Recommend starting over the counter D 1000 IU daily.  Will obtain D level with next labs.  Consider melatonin for insomnia.    Labs ordered today:   No orders of the defined types were placed in this encounter.    Radiology/Consults ordered today: None    More than 50% of the time spent with Ms. Glass on counseling / coordinating her care.  Total face to face time was 20 minutes.      Follow-up:  3 months    Glory Sorto NP  Endocrinology  [facility]  CC: Haase, Susan Rachele

## 2017-10-10 NOTE — NURSING NOTE
"Chief Complaint   Patient presents with     Thyroid Problem     alot of hair loss, breathing problem, insomnia, fast heartbeat       Initial /76 (BP Location: Left arm, Patient Position: Chair, Cuff Size: Adult Regular)  Pulse 75  Temp 98.1  F (36.7  C) (Oral)  Resp 16  Wt 153 lb (69.4 kg)  LMP 09/10/2017  BMI 27.98 kg/m2 Estimated body mass index is 27.98 kg/(m^2) as calculated from the following:    Height as of 5/26/17: 5' 2\" (1.575 m).    Weight as of this encounter: 153 lb (69.4 kg).  Medication Reconciliation: complete    "

## 2017-11-07 DIAGNOSIS — E05.00 GRAVES DISEASE: ICD-10-CM

## 2017-11-07 DIAGNOSIS — R79.89 LOW VITAMIN D LEVEL: ICD-10-CM

## 2017-11-07 PROCEDURE — 84443 ASSAY THYROID STIM HORMONE: CPT | Performed by: CLINICAL NURSE SPECIALIST

## 2017-11-07 PROCEDURE — 84439 ASSAY OF FREE THYROXINE: CPT | Performed by: CLINICAL NURSE SPECIALIST

## 2017-11-07 PROCEDURE — 80048 BASIC METABOLIC PNL TOTAL CA: CPT | Performed by: CLINICAL NURSE SPECIALIST

## 2017-11-07 PROCEDURE — 82306 VITAMIN D 25 HYDROXY: CPT | Performed by: CLINICAL NURSE SPECIALIST

## 2017-11-07 PROCEDURE — 36415 COLL VENOUS BLD VENIPUNCTURE: CPT | Performed by: CLINICAL NURSE SPECIALIST

## 2017-11-07 PROCEDURE — 80076 HEPATIC FUNCTION PANEL: CPT | Performed by: CLINICAL NURSE SPECIALIST

## 2017-11-08 LAB
ALBUMIN SERPL-MCNC: 3.9 G/DL (ref 3.4–5)
ALP SERPL-CCNC: 75 U/L (ref 40–150)
ALT SERPL W P-5'-P-CCNC: 23 U/L (ref 0–50)
ANION GAP SERPL CALCULATED.3IONS-SCNC: 8 MMOL/L (ref 3–14)
AST SERPL W P-5'-P-CCNC: 17 U/L (ref 0–45)
BILIRUB DIRECT SERPL-MCNC: 0.1 MG/DL (ref 0–0.2)
BILIRUB SERPL-MCNC: 0.6 MG/DL (ref 0.2–1.3)
BUN SERPL-MCNC: 19 MG/DL (ref 7–30)
CALCIUM SERPL-MCNC: 8.9 MG/DL (ref 8.5–10.1)
CHLORIDE SERPL-SCNC: 106 MMOL/L (ref 94–109)
CO2 SERPL-SCNC: 25 MMOL/L (ref 20–32)
CREAT SERPL-MCNC: 0.55 MG/DL (ref 0.52–1.04)
DEPRECATED CALCIDIOL+CALCIFEROL SERPL-MC: 26 UG/L (ref 20–75)
GFR SERPL CREATININE-BSD FRML MDRD: >90 ML/MIN/1.7M2
GLUCOSE SERPL-MCNC: 87 MG/DL (ref 70–99)
POTASSIUM SERPL-SCNC: 3.7 MMOL/L (ref 3.4–5.3)
PROT SERPL-MCNC: 8.4 G/DL (ref 6.8–8.8)
SODIUM SERPL-SCNC: 139 MMOL/L (ref 133–144)
T4 FREE SERPL-MCNC: 1.01 NG/DL (ref 0.76–1.46)
TSH SERPL DL<=0.005 MIU/L-ACNC: 3 MU/L (ref 0.4–4)

## 2017-11-11 NOTE — PROGRESS NOTES
Please call -  Yunxia,  Your thyroid levels look good - please continue methimazole 5 mg per day.  Your D level is a little low, if you haven't started over the counter vitamin D, please start 1000 IU per day.  If you are already taking 1000 IU per day, I would like you to increase to 2000 IU per day.  Please follow up in 3 months as we previously discussed.  Let me know if you have any questions.  Glory Sorto NP  Endocrinology

## 2017-12-11 ENCOUNTER — OFFICE VISIT (OUTPATIENT)
Dept: FAMILY MEDICINE | Facility: CLINIC | Age: 42
End: 2017-12-11
Payer: COMMERCIAL

## 2017-12-11 VITALS
SYSTOLIC BLOOD PRESSURE: 137 MMHG | BODY MASS INDEX: 28.16 KG/M2 | HEART RATE: 75 BPM | DIASTOLIC BLOOD PRESSURE: 89 MMHG | HEIGHT: 62 IN | TEMPERATURE: 97.8 F | RESPIRATION RATE: 12 BRPM | WEIGHT: 153 LBS | OXYGEN SATURATION: 97 %

## 2017-12-11 DIAGNOSIS — B97.89 VIRAL SORE THROAT: ICD-10-CM

## 2017-12-11 DIAGNOSIS — E05.00 GRAVES DISEASE: Primary | ICD-10-CM

## 2017-12-11 DIAGNOSIS — J02.8 VIRAL SORE THROAT: ICD-10-CM

## 2017-12-11 LAB
T4 FREE SERPL-MCNC: 1.04 NG/DL (ref 0.76–1.46)
TSH SERPL DL<=0.005 MIU/L-ACNC: 4.02 MU/L (ref 0.4–4)

## 2017-12-11 PROCEDURE — 99213 OFFICE O/P EST LOW 20 MIN: CPT | Performed by: FAMILY MEDICINE

## 2017-12-11 PROCEDURE — 84443 ASSAY THYROID STIM HORMONE: CPT | Performed by: FAMILY MEDICINE

## 2017-12-11 PROCEDURE — 36415 COLL VENOUS BLD VENIPUNCTURE: CPT | Performed by: FAMILY MEDICINE

## 2017-12-11 PROCEDURE — 84439 ASSAY OF FREE THYROXINE: CPT | Performed by: FAMILY MEDICINE

## 2017-12-11 RX ORDER — FLUTICASONE PROPIONATE 50 MCG
1-2 SPRAY, SUSPENSION (ML) NASAL DAILY
Qty: 16 G | Refills: 0 | Status: SHIPPED | OUTPATIENT
Start: 2017-12-11 | End: 2018-05-02

## 2017-12-11 NOTE — PROGRESS NOTES
"  SUBJECTIVE:   Tamica Glass is a 42 year old female who presents to clinic today for the following health issues:    RESPIRATORY SYMPTOMS      Duration: 2 weeks    Description  sore throat, difficulty swallowing    Severity: mild    Accompanying signs and symptoms: None    History (predisposing factors):  none    Precipitating or alleviating factors: None    Therapies tried and outcome:  none    Patient notes sore throat and difficulty swallowing for the past 2 weeks.  No fevers.  No nasal congestion or coughing.  She feels that her throat is dry.  She has not noticed that her thyroid has grown.      Problem list and histories reviewed & adjusted, as indicated.  Additional history: as documented    Patient Active Problem List   Diagnosis     Hyperthyroidism     Graves disease     Thyroid nodule     History reviewed. No pertinent surgical history.    Social History   Substance Use Topics     Smoking status: Never Smoker     Smokeless tobacco: Never Used     Alcohol use No     History reviewed. No pertinent family history.          Reviewed and updated as needed this visit by clinical staff       Reviewed and updated as needed this visit by Provider         ROS:  Constitutional, HEENT, cardiovascular, pulmonary, gi and gu systems are negative, except as otherwise noted.      OBJECTIVE:   /89 (BP Location: Right arm, Patient Position: Chair, Cuff Size: Adult Regular)  Pulse 75  Temp 97.8  F (36.6  C) (Oral)  Resp 12  Ht 5' 2\" (1.575 m)  Wt 153 lb (69.4 kg)  SpO2 97%  BMI 27.98 kg/m2  Body mass index is 27.98 kg/(m^2).  GENERAL: healthy, alert and no distress  EYES: Eyes grossly normal to inspection, PERRL and conjunctivae and sclerae normal  HENT: normal cephalic/atraumatic, ear canals and TM's normal, nose and mouth without ulcers or lesions, oropharynx clear, oral mucous membranes moist and +Postnasal drip   NECK: no adenopathy and thyromegaly approximately 2 times normal  RESP: lungs clear to " auscultation - no rales, rhonchi or wheezes  CV: regular rates and rhythm, normal S1 S2, no S3 or S4 and no murmur, click or rub    ASSESSMENT/PLAN:     1. Viral sore throat  - Sore throat likely viral  - Patient does have thyromegaly, which could be contributing to her swallowing difficulites  - fluticasone (FLONASE) 50 MCG/ACT spray; Spray 1-2 sprays into both nostrils daily  Dispense: 16 g; Refill: 0    2. Graves disease  - Followed by endocrine and has an appt with Glory next week  - Patient requesting labs today   - TSH  - T4 free    Follow up if symptoms are worsening or not improving    Barbara Carrillo, DO  [facility]

## 2017-12-11 NOTE — MR AVS SNAPSHOT
After Visit Summary   12/11/2017    Tamica Glass    MRN: [id number]           Patient Information     Date Of Birth          1975        Visit Information        Provider Department      12/11/2017 8:45 AM Barbara Carrillo DO; [employer] [facility]        Today's Diagnoses     Graves disease    -  1    Viral sore throat           Follow-ups after your visit        Your next 10 appointments already scheduled     Dec 12, 2017 10:00 AM CST   LAB with [facility] LAB   [facility] [place]    [address] [place] [address] [phone]           Please do not eat 10-12 hours before your appointment if you are coming in fasting for labs on lipids, cholesterol, or glucose (sugar). This does not apply to pregnant women. Water, hot tea and black coffee (with nothing added) are okay. Do not drink other fluids, diet soda or chew gum.            Dec 22, 2017  9:30 AM CST   Return Visit with ARIELLE Flores CNP   [facility] ([facility]    [address] [place] [address] [phone]              Who to contact     If you have questions or need follow up information about today's clinic visit or your schedule please contact [facility] directly at [phone].  Normal or non-critical lab and imaging results will be communicated to you by MyChart, letter or phone within 4 business days after the clinic has received the results. If you do not hear from us within 7 days, please contact the clinic through MyChart or phone. If you have a critical or abnormal lab result, we will notify you by phone as soon as possible.  Submit refill requests through [employer] or call your pharmacy and they will forward the refill request to us. Please allow 3 business days for your refill to be completed.          Additional Information About Your Visit       "  MyChart Information     [employer] lets you send messages to your doctor, view your test results, renew your prescriptions, schedule appointments and more. To sign up, go to [email or web address] . Click on \"Log in\" on the left side of the screen, which will take you to the Welcome page. Then click on \"Sign up Now\" on the right side of the page.     You will be asked to enter the access code listed below, as well as some personal information. Please follow the directions to create your username and password.     Your access code is: [email or web address]  Expires: 3/11/2018  9:23 AM     Your access code will  in 90 days. If you need help or a new code, please call your [facility] clinic or [phone].        Care EveryWhere ID     This is your Care EveryWhere ID. This could be used by other organizations to access your [facility] medical records  [id number]        Your Vitals Were     Pulse Temperature Respirations Height Pulse Oximetry BMI (Body Mass Index)    75 97.8  F (36.6  C) (Oral) 12 5' 2\" (1.575 m) 97% 27.98 kg/m2       Blood Pressure from Last 3 Encounters:   17 137/89   10/10/17 115/76   17 (!) 127/93    Weight from Last 3 Encounters:   17 153 lb (69.4 kg)   10/10/17 153 lb (69.4 kg)   17 149 lb 8 oz (67.8 kg)              We Performed the Following     T4 free     TSH          Today's Medication Changes          These changes are accurate as of: 17  9:23 AM.  If you have any questions, ask your nurse or doctor.               Start taking these medicines.        Dose/Directions    fluticasone 50 MCG/ACT spray   Commonly known as:  FLONASE   Used for:  Viral sore throat   Started by:  Barbara Carrillo,         Dose:  1-2 spray   Spray 1-2 sprays into both nostrils daily   Quantity:  16 g   Refills:  0            Where to get your medicines      These medications were sent to [facility] [place], [place] - [address] [place] [address] " [id number]     Phone:  [phone]     fluticasone 50 MCG/ACT spray                Primary Care Provider Fax #    Physician No Ref-Primary [phone]       No address on file        Equal Access to Services     ROMAN ALONSO : Hadii aad ku hadradames Robin, chuy maurer, joselyn kasabiha olivia, nathalia chowdary laDayanheavenly peng. So [facility] [phone].    ATENCIÓN: Si habla español, tiene a allison disposición servicios gratuitos de asistencia lingüística. Llame al [phone].    We comply with applicable federal civil rights laws and [place] laws. We do not discriminate on the basis of race, color, national origin, age, disability, sex, sexual orientation, or gender identity.            Thank you!     Thank you for choosing [facility]  for your care. Our goal is always to provide you with excellent care. Hearing back from our patients is one way we can continue to improve our services. Please take a few minutes to complete the written survey that you may receive in the mail after your visit with us. Thank you!             Your Updated Medication List - Protect others around you: Learn how to safely use, store and throw away your medicines at www.disposemymeds.org.          This list is accurate as of: 12/11/17  9:23 AM.  Always use your most recent med list.                   Brand Name Dispense Instructions for use Diagnosis    fluticasone 50 MCG/ACT spray    FLONASE    16 g    Spray 1-2 sprays into both nostrils daily    Viral sore throat       methimazole 5 MG tablet    TAPAZOLE    90 tablet    Take 1 tablet (5 mg) by mouth daily    Graves disease       MULTIVITAMIN PO           VITAMIN D (CHOLECALCIFEROL) PO      Take 2,000 Units by mouth daily

## 2017-12-22 ENCOUNTER — OFFICE VISIT (OUTPATIENT)
Dept: ENDOCRINOLOGY | Facility: CLINIC | Age: 42
End: 2017-12-22
Payer: COMMERCIAL

## 2017-12-22 VITALS
BODY MASS INDEX: 28.13 KG/M2 | DIASTOLIC BLOOD PRESSURE: 83 MMHG | HEART RATE: 74 BPM | OXYGEN SATURATION: 98 % | TEMPERATURE: 97.5 F | WEIGHT: 153.8 LBS | SYSTOLIC BLOOD PRESSURE: 120 MMHG | RESPIRATION RATE: 12 BRPM

## 2017-12-22 DIAGNOSIS — E05.00 GRAVES DISEASE: Primary | ICD-10-CM

## 2017-12-22 DIAGNOSIS — E04.1 THYROID NODULE: ICD-10-CM

## 2017-12-22 PROCEDURE — 99214 OFFICE O/P EST MOD 30 MIN: CPT | Performed by: CLINICAL NURSE SPECIALIST

## 2017-12-22 RX ORDER — METHIMAZOLE 5 MG/1
2.5 TABLET ORAL DAILY
Qty: 45 TABLET | Refills: 1 | Status: SHIPPED | OUTPATIENT
Start: 2017-12-22 | End: 2018-05-02

## 2017-12-22 NOTE — PROGRESS NOTES
"Name: Sunil Glass  F/u for Hyperthyroidism/Graves' disease (Last seen 10/10/2017).    HPI:  Sunil Glass is a 42 year old female who presents for the evaluation of Hyperthyroidism.  She comes in today accompanied by an .  She previously noted increased anxiety, fatigue, some difficulty swallowing, and racing heart.  labs done for further evaluation were significant for elevated free T4 with suppressed TSH.  No past history of thyroid disease.  No known FH of thyroid dysfunction or thyroid cancer.    TSI antibodies were elevated.  Thyroid uptake scan showed elevated 24-hour uptake of 85%  Thyroid ultrasound showed two thyroid nodules - 2.0 cm right nodule and a 0.8 cm left nodule.  Uptake scan showed a photopenic area of uptake in the right lobe corresponding to the 2.0 cm right nodule seen on ultrasound.  FNA biopsy of the 2.0 cm right nodule done 5/19/2017 was benign.    She started Methimazole 10 mg bid (5/12/2017).  Methimazole dose was decreased to 10 mg qd 7/2017 due to elevated TSH.  Methimazole dose was further decreased to 5 mg/day 8/2017.  She's here today for follow up.    Feels well for the most part, still noting some mild fatigue.    History of radiation exposure: NO  History of thyroid dysfunction: NO  Changes in weight: Yes, weight gain, 138 --> 153 lbs.  No further weight gain since last seen  Vital Signs 10/10/2017 12/11/2017 12/22/2017   Weight (LB) 153 lb 153 lb 153 lb 12.8 oz   Height  5' 2\"    BMI (Calculated)  28.04      PMH/PSH:  Past Medical History:   Diagnosis Date     Graves disease      No past surgical history on file.  Family Hx:  No family history on file.  Thyroid disease: No         DM2:          Autoimmune: DM1, SLE, RA, Vitiligo No    Social Hx:  Social History     Social History     Marital status: Single     Spouse name: N/A     Number of children: N/A     Years of education: N/A     Occupational History     Not on file.     Social History Main Topics     Smoking " status: Never Smoker     Smokeless tobacco: Never Used     Alcohol use No     Drug use: No     Sexual activity: No     Other Topics Concern     Not on file     Social History Narrative          MEDICATIONS:  has a current medication list which includes the following prescription(s): cholecalciferol, fluticasone, methimazole, and multiple vitamins-minerals.    ROS   ROS: 10 point ROS neg other than the symptoms noted above in the HPI.    Physical Exam   VS: There were no vitals taken for this visit.  GENERAL: AXOX3, NAD, well dressed, answering questions appropriately, appears stated age.  HEENT:  no exophthalmos, no proptosis, EOMI, no lig lag, no retraction  NECK:  Supple, thyroid gland diffusely enlarged, 2.0 cm right nodule palpable, nontender.  CV: RRR  LUNGS: Clear  ABDOMEN: soft, nontender, nondistended  EXTREMITIES: no edema, +pulses, no rashes, no lesions, no pretibial edema  NEUROLOGY: CN grossly intact, no fine tremor with outstretched hands.  MSK: grossly intact  SKIN: no rashes, no lesions    LABS:  TFTs:  !THYROID Latest Ref Rng & Units 12/11/2017 11/7/2017 9/29/2017   TSH 0.40 - 4.00 mU/L 4.02 (H) 3.00 1.86   T4 FREE 0.76 - 1.46 ng/dL 1.04 1.01 1.04     !THYROID Latest Ref Rng & Units 8/15/2017 7/10/2017 6/7/2017   TSH 0.40 - 4.00 mU/L 11.48 (H) 0.47 <0.01 (L)   T4 FREE 0.76 - 1.46 ng/dL 0.77 0.52 (L) 1.50 (H)     !THYROID Latest Ref Rng & Units 4/19/2017   TSH 0.40 - 4.00 mU/L <0.01 (L)   T4 FREE 0.76 - 1.46 ng/dL 3.02 (H)     Component    Latest Ref Rng & Units 4/26/2017   Free T3    2.3 - 4.2 pg/mL 13.0 (H)     Component    Latest Ref Rng & Units 4/26/2017   Thyroglobulin Antibody    <40 IU/mL <20   Thyroid Peroxidase Antibody    <35 IU/mL 138 (H)   Thyroid Stim Immunog     5.1 (H)     CBC:  Component    Latest Ref Rng & Units 4/26/2017   WBC    4.0 - 11.0 10e9/L 4.0   RBC Count    3.8 - 5.2 10e12/L 4.50   Hemoglobin    11.7 - 15.7 g/dL 13.3   Hematocrit    35.0 - 47.0 % 39.8   MCV    78 - 100 fl 88    MCH    26.5 - 33.0 pg 29.6   MCHC    31.5 - 36.5 g/dL 33.4   RDW    10.0 - 15.0 % 11.7   Platelet Count    150 - 450 10e9/L 239     Component      Latest Ref Rng & Units 9/29/2017   WBC      4.0 - 11.0 10e9/L 7.1       Component    Latest Ref Rng & Units 4/26/2017   Sed Rate    0 - 20 mm/h 16     LFTs:  !LIPID/HEPATIC Latest Ref Rng & Units 9/29/2017   AST 0 - 45 U/L 13   ALT 0 - 50 U/L 25     Component      Latest Ref Rng & Units 11/7/2017   Vitamin D Deficiency screening      20 - 75 ug/L 26     ULTRASOUND THYROID       HISTORY: Thyrotoxicosis.     COMPARISON: None.   .  FINDINGS: The thyroid gland appears mildly enlarged. The right lobe of  the thyroid measures 5.6 x 2.2 x 2.1 cm. The left lobe of the thyroid  measures 5.6 x 2.1 x 2 cm. The isthmus measures 0.9 cm. Background  thyroid parenchymal echogenicity is mildly heterogeneous. The thyroid  gland also appears diffusely hypervascular.     Individual thyroid nodules are measured as follows:   1. Hyperechoic nodule in the inferior right thyroid lobe measures 2 x  1.5 x 1.8 cm.  2. Hyperechoic nodule in the superior left thyroid lobe measures 0.8 x  0.5 x 0.4 cm.      IMPRESSION:   1. The thyroid gland is mildly enlarged and diffusely hypervascular.  2. Two hyperechoic thyroid nodules are noted, with the largest on the  right measuring 2 cm.      NUCLEAR MEDICINE THYROID UPTAKE AND SCAN 5/10/2017      HISTORY: Thyrotoxicosis, unspecified without thyrotoxic crisis or  storm. Thyrotoxicosis with diffuse goiter without thyrotoxic crisis or  storm. Decreased white blood cell count, unspecified.      COMPARISON: Thyroid ultrasound 5/4/2017.     TECHNIQUE: Patient was given 234 uCi of I-123 orally followed by  24-hour uptake and scan.     FINDINGS: Thyroid gland is enlarged with fairly homogeneous uptake  throughout most of the thyroid. There is an area of photopenia in the  lower pole right thyroid lobe corresponding to the nodule seen on  recent thyroid  ultrasound. Findings are suspicious for a cold nodule.  24-hour uptake is 85% which is well above normal limits.     Normal range is 10-30% 24-hour uptake.         IMPRESSION: Enlarged thyroid gland with photopenic area corresponding  to the large right lower lobe thyroid nodule on prior ultrasound.  Findings are concerning for a cold nodule which have an increased  incidence of malignancy. Follow-up percutaneous biopsy of this nodule  suggested for further assessment. 24-hour uptake is markedly increased  at 85% consistent with hyperthyroid state.      Copath Report 05/19/2017 10:40 AM 88   Patient Name: SHANDA JIMENEZ   MR#: [id number]   Specimen #: [id number]   Collected: 5/19/2017   Received: 5/19/2017   Reported: 5/23/2017 09:24   Ordering Phy(s): RISA SORIANO   Additional Phy(s): SUSAN RACHELE HAASE     For improved result formatting, select 'View Enhanced Report Format',   under Linked Documents section.     SPECIMEN/STAIN PROCESS:   FNA-thyroid, right nodule        Pap-Cyto x 7, Garcia's stain-cyto x 2        CYTOLOGIC INTERPRETATION:      FNA-thyroid, right nodule:   Benign   Consistent with a benign nodule (includes adenomatoid nodule, colloid   nodule, etc.)     The Rowena Implied Risk of Malignancy and Recommended Clinical   Management:   Benign has a 0-3% risk of malignancy, recommended management is clinical   follow-up     Specimen Adequacy: Satisfactory for evaluation.     All pertinent notes, labs, and images personally reviewed by me.     A/P  Ms.Yunxia Jimenez is a 42 year old here for the treatment/follow up of Grave's disease.    Currently treated with Methimazole 5 mg qd.    Has been treated with Methimazole x 6 months.  Recent TSH slightly elevated.  Decrease Methimazole to 2.5 mg qd.  .  F/u in 3 months with labs priorl    Treatment of Graves' hyperthyroidism consists of amelioration of symptoms with a beta-blocker and measures aimed at decreasing thyroid hormone synthesis: the administration  of a thionamide, radioiodine ablation, or surgery.  Thionamides -- Thionamides (methimazole and propylthiouracial (PTU)).  Methimazole is preferred because of its longer duration of action (once daily dosing) and has lower incidence of side effects.   PTU is preferred during pregnancy because of the potential teratogenic effects of methimazole.   The goal of therapy in Graves' hyperthyroidism is to be euthyroid within three to eight weeks. This can be followed by ablative therapy with radioiodine or surgery or by continuation of the drug for a prolonged period (usually one to two years) with the hope of attaining a permanent remission. If long-term medical therapy is chosen, the dose of methimazole is then tapered to a maintenance dose with the goal of maintaining a euthyroid state.  Both MMI and PTU can cause pruritus, rash, urticaria, arthralgias, arthritis, fever, abnormal taste sensation, nausea, or vomiting in up to 13 percent of patients.  If one drug is not tolerated, the other drug can be substituted, but up to 50 percent of patients have cross-sensitivity. The gastrointestinal side effects are dose-dependent. Thus, patients taking higher doses of MMI should be started on divided doses.  Agranulocytosis is a rare but serious complication of thionamide therapy, with a prevalence of 0.2 to 0.5 percent, and usually occurs within the first two months of treatment. The risk of agranulocytosis is higher for antithyroid drugs than for 20 other classes of drugs associated with this rare complication.  Hepatotoxicity is a rare complication of thionamide therapy. Serum aminotransferase concentrations increase transiently in up to one-third of patients taking PTU.    Radioiodine ablation -- Radioiodine is widely used for the treatment of Graves' hyperthyroidism. It is the therapy of choice in the [place].  Radioiodine therapy may be associated with an increased risk of the development or worsening of Graves'  ophthalmopathy.  Radioiodine is administered as a capsule and induces extensive tissue damage, resulting in ablation of the thyroid within 6 to 18 weeks.   Approximately 20 percent of patients fail the first radioiodine treatment and require a second or subsequent dose. These patients usually have more severe hyperthyroidism or larger goiters.  Suppressed TSH:  Differential for hyperthyroidism includes:  Graves' disease, thyroiditis, or autonomous hyperfunctioning nodule.  An uptake and scan of her thyroid gland will help differentiate the diagnosis.  In Graves' disease her uptake will be homogeneous and increased, in thyroiditis her uptake will be low, and in an autonomous hyperfunctioing nodule the uptake will be increased in a focal area.      Thyroid-stimulating immunoglobulins (TSI) can be detected in the majority of patients (77.8%) with Graves  disease.  It can be used to predict relapse or remission when using PTU/methimazole or radioiodine.  These assays have also been advocated for use in patients with subclinical Graves  hyperthyroidism or patients with unilateral ophthalmopathy.    Thyrotoxicosis associated with subacute thyroiditis is usually mild and transient.  The   patient lacks the physical findings of long-standing thyrotoxicosis. The goiter is typically  painful and low or absent 131I uptake.  Usually the erythrocyte sedimentation rate (ESR) and CRP are greatly elevated, and the leukocyte count may also be increased. Antibody titers are low or negative.    2.  Thyroid nodules.  Thyroid nodules are common and are frequently benign. Data suggest that the prevalence of palpable thyroid nodules is 3% to 7% in [place]; the prevalence is as high as 50% based on ultrasonography (US) or autopsy data. All patients with a palpable thyroid nodule, however, should undergo US examination. US-guided FNA (US-FNA) is recommended for nodules ?10 mm; US-FNA is suggested for nodules <10 mm only if clinical  information or US features are suspicious.    Causes of thyroid Nodules: Benign (Multinodular goiter, Hashimoto s thyroiditis, Simple or hemorrhagic cysts, Follicular adenomas, Subacute thyroiditis) or Malignant(Papillary carcinoma, Follicular carcinoma, Hürthle cell carcinoma, Medullary carcinoma, Anaplastic carcinoma, Primary thyroid lymphoma, or Metastatic malignant lesion).    MultiNodule:  The risk of cancer is not significantly higher in palpable solitary thyroid nodules than in multinodular lesions or in nodules in diffuse goiters. In multinodular thyroid glands, the cytologic sampling should be focused on lesions characterized by suspicious US features rather than on larger or clinically dominant nodules.    Cyst:  Most complex thyroid nodules with a dominant fluid component are benign. USFNA, however, should always be performed because the rare papillary thyroid carcinoma (PTC) can be cystic. An unsatisfactory (nondiagnostic) specimen usually results from a cystic nodule that yields few or no follicular cells. Reaspiration yields satisfactory results in 50% of cases.    Fine-Needle Aspiration Cytologic Diagnosis: 70% of FNA specimens are classified as benign; in addition, 5% are malignant, 10% are suspicious, and 10% to 20% are nondiagnostic or unsatisfactory. At surgical intervention, about 20% of such indeterminate/suspicious specimens are found to be malignant lesions.    Despite good initial technique, repeated biopsy, and US-FNA, approximately 5% of nodules still remain nondiagnostic. Such thyroid nodules should be surgically excised.    FNA biopsy of the 2.0 cm right thyroid nodule done 5/19/2017 was benign.  Plan to repeat thyroid/neck ultrasound at this time.      Previous D level was low.  Does not take any supplemental D.  Taking over the counter D 2000 IU daily.  Recent D level 26.    Labs ordered today:   Orders Placed This Encounter   Procedures     TSH     T4 FREE     Hepatic panel     WBC  count     Radiology/Consults ordered today: None    More than 50% of the time spent with Ms. Glass on counseling / coordinating her care.  Total face to face time was 20 minutes.      Follow-up:  3 months    Glory Sorto NP  Endocrinology  [facility]  CC: Haase, Susan Rachele

## 2017-12-22 NOTE — NURSING NOTE
"Chief Complaint   Patient presents with     Thyroid Problem       Initial /83 (BP Location: Left arm, Patient Position: Chair, Cuff Size: Adult Regular)  Pulse 74  Temp 97.5  F (36.4  C) (Oral)  Resp 12  Wt 153 lb 12.8 oz (69.8 kg)  LMP 12/17/2017  SpO2 98%  Breastfeeding? No  BMI 28.13 kg/m2 Estimated body mass index is 28.13 kg/(m^2) as calculated from the following:    Height as of 12/11/17: 5' 2\" (1.575 m).    Weight as of this encounter: 153 lb 12.8 oz (69.8 kg).  Medication Reconciliation: complete  "

## 2017-12-22 NOTE — LETTER
"    12/22/2017         RE: Tamica Glass  [address] [place] [address]        Dear Colleague,    Thank you for referring your patient, Tamica Glass, to the [facility]. Please see a copy of my visit note below.    Name: Sunil Glass  F/u for Hyperthyroidism/Graves' disease (Last seen 10/10/2017).    HPI:  Sunil Glass is a 42 year old female who presents for the evaluation of Hyperthyroidism.  She comes in today accompanied by an .  She previously noted increased anxiety, fatigue, some difficulty swallowing, and racing heart.  labs done for further evaluation were significant for elevated free T4 with suppressed TSH.  No past history of thyroid disease.  No known FH of thyroid dysfunction or thyroid cancer.    TSI antibodies were elevated.  Thyroid uptake scan showed elevated 24-hour uptake of 85%  Thyroid ultrasound showed two thyroid nodules - 2.0 cm right nodule and a 0.8 cm left nodule.  Uptake scan showed a photopenic area of uptake in the right lobe corresponding to the 2.0 cm right nodule seen on ultrasound.  FNA biopsy of the 2.0 cm right nodule done 5/19/2017 was benign.    She started Methimazole 10 mg bid (5/12/2017).  Methimazole dose was decreased to 10 mg qd 7/2017 due to elevated TSH.  Methimazole dose was further decreased to 5 mg/day 8/2017.  She's here today for follow up.    Feels well for the most part, still noting some mild fatigue.    History of radiation exposure: NO  History of thyroid dysfunction: NO  Changes in weight: Yes, weight gain, 138 --> 153 lbs.  No further weight gain since last seen  Vital Signs 10/10/2017 12/11/2017 12/22/2017   Weight (LB) 153 lb 153 lb 153 lb 12.8 oz   Height  5' 2\"    BMI (Calculated)  28.04      PMH/PSH:  Past Medical History:   Diagnosis Date     Graves disease      No past surgical history on file.  Family Hx:  No family history on file.  Thyroid disease: No         DM2:          Autoimmune: DM1, SLE, RA, Vitiligo " No    Social Hx:  Social History     Social History     Marital status: Single     Spouse name: N/A     Number of children: N/A     Years of education: N/A     Occupational History     Not on file.     Social History Main Topics     Smoking status: Never Smoker     Smokeless tobacco: Never Used     Alcohol use No     Drug use: No     Sexual activity: No     Other Topics Concern     Not on file     Social History Narrative          MEDICATIONS:  has a current medication list which includes the following prescription(s): cholecalciferol, fluticasone, methimazole, and multiple vitamins-minerals.    ROS   ROS: 10 point ROS neg other than the symptoms noted above in the HPI.    Physical Exam   VS: There were no vitals taken for this visit.  GENERAL: AXOX3, NAD, well dressed, answering questions appropriately, appears stated age.  HEENT:  no exophthalmos, no proptosis, EOMI, no lig lag, no retraction  NECK:  Supple, thyroid gland diffusely enlarged, 2.0 cm right nodule palpable, nontender.  CV: RRR  LUNGS: Clear  ABDOMEN: soft, nontender, nondistended  EXTREMITIES: no edema, +pulses, no rashes, no lesions, no pretibial edema  NEUROLOGY: CN grossly intact, no fine tremor with outstretched hands.  MSK: grossly intact  SKIN: no rashes, no lesions    LABS:  TFTs:  !THYROID Latest Ref Rng & Units 12/11/2017 11/7/2017 9/29/2017   TSH 0.40 - 4.00 mU/L 4.02 (H) 3.00 1.86   T4 FREE 0.76 - 1.46 ng/dL 1.04 1.01 1.04     !THYROID Latest Ref Rng & Units 8/15/2017 7/10/2017 6/7/2017   TSH 0.40 - 4.00 mU/L 11.48 (H) 0.47 <0.01 (L)   T4 FREE 0.76 - 1.46 ng/dL 0.77 0.52 (L) 1.50 (H)     !THYROID Latest Ref Rng & Units 4/19/2017   TSH 0.40 - 4.00 mU/L <0.01 (L)   T4 FREE 0.76 - 1.46 ng/dL 3.02 (H)     Component    Latest Ref Rng & Units 4/26/2017   Free T3    2.3 - 4.2 pg/mL 13.0 (H)     Component    Latest Ref Rng & Units 4/26/2017   Thyroglobulin Antibody    <40 IU/mL <20   Thyroid Peroxidase Antibody    <35 IU/mL 138 (H)   Thyroid Stim  Immunog     5.1 (H)     CBC:  Component    Latest Ref Rng & Units 4/26/2017   WBC    4.0 - 11.0 10e9/L 4.0   RBC Count    3.8 - 5.2 10e12/L 4.50   Hemoglobin    11.7 - 15.7 g/dL 13.3   Hematocrit    35.0 - 47.0 % 39.8   MCV    78 - 100 fl 88   MCH    26.5 - 33.0 pg 29.6   MCHC    31.5 - 36.5 g/dL 33.4   RDW    10.0 - 15.0 % 11.7   Platelet Count    150 - 450 10e9/L 239     Component      Latest Ref Rng & Units 9/29/2017   WBC      4.0 - 11.0 10e9/L 7.1       Component    Latest Ref Rng & Units 4/26/2017   Sed Rate    0 - 20 mm/h 16     LFTs:  !LIPID/HEPATIC Latest Ref Rng & Units 9/29/2017   AST 0 - 45 U/L 13   ALT 0 - 50 U/L 25     Component      Latest Ref Rng & Units 11/7/2017   Vitamin D Deficiency screening      20 - 75 ug/L 26     ULTRASOUND THYROID       HISTORY: Thyrotoxicosis.     COMPARISON: None.   .  FINDINGS: The thyroid gland appears mildly enlarged. The right lobe of  the thyroid measures 5.6 x 2.2 x 2.1 cm. The left lobe of the thyroid  measures 5.6 x 2.1 x 2 cm. The isthmus measures 0.9 cm. Background  thyroid parenchymal echogenicity is mildly heterogeneous. The thyroid  gland also appears diffusely hypervascular.     Individual thyroid nodules are measured as follows:   1. Hyperechoic nodule in the inferior right thyroid lobe measures 2 x  1.5 x 1.8 cm.  2. Hyperechoic nodule in the superior left thyroid lobe measures 0.8 x  0.5 x 0.4 cm.      IMPRESSION:   1. The thyroid gland is mildly enlarged and diffusely hypervascular.  2. Two hyperechoic thyroid nodules are noted, with the largest on the  right measuring 2 cm.      NUCLEAR MEDICINE THYROID UPTAKE AND SCAN 5/10/2017      HISTORY: Thyrotoxicosis, unspecified without thyrotoxic crisis or  storm. Thyrotoxicosis with diffuse goiter without thyrotoxic crisis or  storm. Decreased white blood cell count, unspecified.      COMPARISON: Thyroid ultrasound 5/4/2017.     TECHNIQUE: Patient was given 234 uCi of I-123 orally followed by  24-hour uptake  and scan.     FINDINGS: Thyroid gland is enlarged with fairly homogeneous uptake  throughout most of the thyroid. There is an area of photopenia in the  lower pole right thyroid lobe corresponding to the nodule seen on  recent thyroid ultrasound. Findings are suspicious for a cold nodule.  24-hour uptake is 85% which is well above normal limits.     Normal range is 10-30% 24-hour uptake.         IMPRESSION: Enlarged thyroid gland with photopenic area corresponding  to the large right lower lobe thyroid nodule on prior ultrasound.  Findings are concerning for a cold nodule which have an increased  incidence of malignancy. Follow-up percutaneous biopsy of this nodule  suggested for further assessment. 24-hour uptake is markedly increased  at 85% consistent with hyperthyroid state.      Copath Report 05/19/2017 10:40 AM 88   Patient Name: SHANDA JIMENEZ   MR#: [id number]   Specimen #: [id number]   Collected: 5/19/2017   Received: 5/19/2017   Reported: 5/23/2017 09:24   Ordering Phy(s): RISA SORIANO   Additional Phy(s): SUSAN RACHELE HAASE     For improved result formatting, select 'View Enhanced Report Format',   under Linked Documents section.     SPECIMEN/STAIN PROCESS:   FNA-thyroid, right nodule        Pap-Cyto x 7, Garcia's stain-cyto x 2        CYTOLOGIC INTERPRETATION:      FNA-thyroid, right nodule:   Benign   Consistent with a benign nodule (includes adenomatoid nodule, colloid   nodule, etc.)     The [place] Implied Risk of Malignancy and Recommended Clinical   Management:   Benign has a 0-3% risk of malignancy, recommended management is clinical   follow-up     Specimen Adequacy: Satisfactory for evaluation.     All pertinent notes, labs, and images personally reviewed by me.     A/P  Ms.Yunxia Jimenez is a 42 year old here for the treatment/follow up of Grave's disease.    Currently treated with Methimazole 5 mg qd.    Has been treated with Methimazole x 6 months.  Recent TSH slightly elevated.  Decrease  Methimazole to 2.5 mg qd.  .  F/u in 3 months with labs priorl    Treatment of Graves' hyperthyroidism consists of amelioration of symptoms with a beta-blocker and measures aimed at decreasing thyroid hormone synthesis: the administration of a thionamide, radioiodine ablation, or surgery.  Thionamides -- Thionamides (methimazole and propylthiouracial (PTU)).  Methimazole is preferred because of its longer duration of action (once daily dosing) and has lower incidence of side effects.   PTU is preferred during pregnancy because of the potential teratogenic effects of methimazole.   The goal of therapy in Graves' hyperthyroidism is to be euthyroid within three to eight weeks. This can be followed by ablative therapy with radioiodine or surgery or by continuation of the drug for a prolonged period (usually one to two years) with the hope of attaining a permanent remission. If long-term medical therapy is chosen, the dose of methimazole is then tapered to a maintenance dose with the goal of maintaining a euthyroid state.  Both MMI and PTU can cause pruritus, rash, urticaria, arthralgias, arthritis, fever, abnormal taste sensation, nausea, or vomiting in up to 13 percent of patients.  If one drug is not tolerated, the other drug can be substituted, but up to 50 percent of patients have cross-sensitivity. The gastrointestinal side effects are dose-dependent. Thus, patients taking higher doses of MMI should be started on divided doses.  Agranulocytosis is a rare but serious complication of thionamide therapy, with a prevalence of 0.2 to 0.5 percent, and usually occurs within the first two months of treatment. The risk of agranulocytosis is higher for antithyroid drugs than for 20 other classes of drugs associated with this rare complication.  Hepatotoxicity is a rare complication of thionamide therapy. Serum aminotransferase concentrations increase transiently in up to one-third of patients taking PTU.    Radioiodine  ablation -- Radioiodine is widely used for the treatment of Graves' hyperthyroidism. It is the therapy of choice in the [place].  Radioiodine therapy may be associated with an increased risk of the development or worsening of Graves' ophthalmopathy.  Radioiodine is administered as a capsule and induces extensive tissue damage, resulting in ablation of the thyroid within 6 to 18 weeks.   Approximately 20 percent of patients fail the first radioiodine treatment and require a second or subsequent dose. These patients usually have more severe hyperthyroidism or larger goiters.  Suppressed TSH:  Differential for hyperthyroidism includes:  Graves' disease, thyroiditis, or autonomous hyperfunctioning nodule.  An uptake and scan of her thyroid gland will help differentiate the diagnosis.  In Graves' disease her uptake will be homogeneous and increased, in thyroiditis her uptake will be low, and in an autonomous hyperfunctioing nodule the uptake will be increased in a focal area.      Thyroid-stimulating immunoglobulins (TSI) can be detected in the majority of patients (77.8%) with Graves  disease.  It can be used to predict relapse or remission when using PTU/methimazole or radioiodine.  These assays have also been advocated for use in patients with subclinical Graves  hyperthyroidism or patients with unilateral ophthalmopathy.    Thyrotoxicosis associated with subacute thyroiditis is usually mild and transient.  The   patient lacks the physical findings of long-standing thyrotoxicosis. The goiter is typically  painful and low or absent 131I uptake.  Usually the erythrocyte sedimentation rate (ESR) and CRP are greatly elevated, and the leukocyte count may also be increased. Antibody titers are low or negative.    2.  Thyroid nodules.  Thyroid nodules are common and are frequently benign. Data suggest that the prevalence of palpable thyroid nodules is 3% to 7% in [place]; the prevalence is as high as 50% based on  ultrasonography (US) or autopsy data. All patients with a palpable thyroid nodule, however, should undergo US examination. US-guided FNA (US-FNA) is recommended for nodules ?10 mm; US-FNA is suggested for nodules <10 mm only if clinical information or US features are suspicious.    Causes of thyroid Nodules: Benign (Multinodular goiter, Hashimoto s thyroiditis, Simple or hemorrhagic cysts, Follicular adenomas, Subacute thyroiditis) or Malignant(Papillary carcinoma, Follicular carcinoma, Hürthle cell carcinoma, Medullary carcinoma, Anaplastic carcinoma, Primary thyroid lymphoma, or Metastatic malignant lesion).    MultiNodule:  The risk of cancer is not significantly higher in palpable solitary thyroid nodules than in multinodular lesions or in nodules in diffuse goiters. In multinodular thyroid glands, the cytologic sampling should be focused on lesions characterized by suspicious US features rather than on larger or clinically dominant nodules.    Cyst:  Most complex thyroid nodules with a dominant fluid component are benign. USFNA, however, should always be performed because the rare papillary thyroid carcinoma (PTC) can be cystic. An unsatisfactory (nondiagnostic) specimen usually results from a cystic nodule that yields few or no follicular cells. Reaspiration yields satisfactory results in 50% of cases.    Fine-Needle Aspiration Cytologic Diagnosis: 70% of FNA specimens are classified as benign; in addition, 5% are malignant, 10% are suspicious, and 10% to 20% are nondiagnostic or unsatisfactory. At surgical intervention, about 20% of such indeterminate/suspicious specimens are found to be malignant lesions.    Despite good initial technique, repeated biopsy, and US-FNA, approximately 5% of nodules still remain nondiagnostic. Such thyroid nodules should be surgically excised.    FNA biopsy of the 2.0 cm right thyroid nodule done 5/19/2017 was benign.  Plan to repeat thyroid/neck ultrasound at this  time.      Previous D level was low.  Does not take any supplemental D.  Taking over the counter D 2000 IU daily.  Recent D level 26.    Labs ordered today:   Orders Placed This Encounter   Procedures     TSH     T4 FREE     Hepatic panel     WBC count     Radiology/Consults ordered today: None    More than 50% of the time spent with Ms. Glass on counseling / coordinating her care.  Total face to face time was 20 minutes.      Follow-up:  3 months    Glory Sorto NP  Endocrinology  [facility]  CC: Haase, Susan Rachele          Again, thank you for allowing me to participate in the care of your patient.        Sincerely,        ARIELLE Flores CNP

## 2017-12-22 NOTE — MR AVS SNAPSHOT
After Visit Summary   12/22/2017    Tamica Glass    MRN: [id number]           Patient Information     Date Of Birth          1975        Visit Information        Provider Department      12/22/2017 9:15 AM Glory Sorto APRN CNP; [employer] [facility]        Today's Diagnoses     Graves disease    -  1    Thyroid nodule          Care Instructions        Decrease Methimazole to 2.5 mg (1/2 tablet) per day.   Schedule a repeat thyroid ultrasound - [facility] will contact you to schedule this.    Follow up in 3 months.  You can have labs done 2-3 days before the appointment.    Try Biotene mouth rinse for dry mouth  Or wam saline gargle  Try using a humidifier at home, especially in your bedroom at night.    Glory Sroto NP  Endocrinology            Follow-ups after your visit        Future tests that were ordered for you today     Open Future Orders        Priority Expected Expires Ordered    US Thyroid Routine  12/22/2018 12/22/2017    TSH Routine  12/22/2018 12/22/2017    T4 FREE Routine  12/22/2018 12/22/2017    Hepatic panel Routine  12/22/2018 12/22/2017    WBC count Routine  12/22/2018 12/22/2017            Who to contact     If you have questions or need follow up information about today's clinic visit or your schedule please contact [facility] directly at [phone].  Normal or non-critical lab and imaging results will be communicated to you by MyChart, letter or phone within 4 business days after the clinic has received the results. If you do not hear from us within 7 days, please contact the clinic through MyChart or phone. If you have a critical or abnormal lab result, we will notify you by phone as soon as possible.  Submit refill requests through [employer] or call your pharmacy and they will forward the refill request to us. Please allow 3 business days for your refill to be completed.          Additional Information  "About Your Visit        MyChart Information     [facility] lets you send messages to your doctor, view your test results, renew your prescriptions, schedule appointments and more. To sign up, go to [email or web address] . Click on \"Log in\" on the left side of the screen, which will take you to the Welcome page. Then click on \"Sign up Now\" on the right side of the page.     You will be asked to enter the access code listed below, as well as some personal information. Please follow the directions to create your username and password.     Your access code is: [email or web address]  Expires: 3/11/2018  9:23 AM     Your access code will  in 90 days. If you need help or a new code, please call your [facility] clinic or [phone].        Care EveryWhere ID     This is your Care EveryWhere ID. This could be used by other organizations to access your [facility] medical records  [id number]        Your Vitals Were     Pulse Temperature Respirations Last Period Pulse Oximetry Breastfeeding?    74 97.5  F (36.4  C) (Oral) 12 2017 98% No    BMI (Body Mass Index)                   28.13 kg/m2            Blood Pressure from Last 3 Encounters:   17 120/83   17 137/89   10/10/17 115/76    Weight from Last 3 Encounters:   17 153 lb 12.8 oz (69.8 kg)   17 153 lb (69.4 kg)   10/10/17 153 lb (69.4 kg)               Primary Care Provider Fax #    Physician No Ref-Primary [phone]       No address on file        Equal Access to Services     [facility] : Hadii sami Robin, waaxda luqadaha, qaybta kaalnathalia wolff. So [facility] [phone].    ATENCIÓN: Si habla español, tiene a allison disposición servicios gratuitos de asistencia lingüística. Llame al [phone].    We comply with applicable federal civil rights laws and [place] laws. We do not discriminate on the basis of race, color, national origin, age, disability, sex, sexual orientation, or gender " identity.            Thank you!     Thank you for choosing [facility]  for your care. Our goal is always to provide you with excellent care. Hearing back from our patients is one way we can continue to improve our services. Please take a few minutes to complete the written survey that you may receive in the mail after your visit with us. Thank you!             Your Updated Medication List - Protect others around you: Learn how to safely use, store and throw away your medicines at www.disposemymeds.org.          This list is accurate as of: 12/22/17 10:01 AM.  Always use your most recent med list.                   Brand Name Dispense Instructions for use Diagnosis    fluticasone 50 MCG/ACT spray    FLONASE    16 g    Spray 1-2 sprays into both nostrils daily    Viral sore throat       methimazole 5 MG tablet    TAPAZOLE    90 tablet    Take 1 tablet (5 mg) by mouth daily    Graves disease       MULTIVITAMIN PO           VITAMIN D (CHOLECALCIFEROL) PO      Take 2,000 Units by mouth daily

## 2017-12-22 NOTE — PATIENT INSTRUCTIONS
Decrease Methimazole to 2.5 mg (1/2 tablet) per day.   Schedule a repeat thyroid ultrasound - [facility] will contact you to schedule this.    Follow up in 3 months.  You can have labs done 2-3 days before the appointment.    Try Biotene mouth rinse for dry mouth  Or wam saline gargle  Try using a humidifier at home, especially in your bedroom at night.    Glory Sorto NP  Endocrinology

## 2017-12-29 ENCOUNTER — HOSPITAL ENCOUNTER (OUTPATIENT)
Dept: ULTRASOUND IMAGING | Facility: CLINIC | Age: 42
Discharge: HOME | End: 2017-12-29
Attending: CLINICAL NURSE SPECIALIST | Admitting: CLINICAL NURSE SPECIALIST
Payer: COMMERCIAL

## 2017-12-29 DIAGNOSIS — E04.1 THYROID NODULE: ICD-10-CM

## 2017-12-29 PROCEDURE — 76536 US EXAM OF HEAD AND NECK: CPT

## 2018-02-24 DIAGNOSIS — E05.00 GRAVES DISEASE: ICD-10-CM

## 2018-02-24 LAB — WBC # BLD AUTO: 5.5 10E9/L (ref 4–11)

## 2018-02-24 PROCEDURE — 84439 ASSAY OF FREE THYROXINE: CPT | Performed by: CLINICAL NURSE SPECIALIST

## 2018-02-24 PROCEDURE — 84443 ASSAY THYROID STIM HORMONE: CPT | Performed by: CLINICAL NURSE SPECIALIST

## 2018-02-24 PROCEDURE — 80076 HEPATIC FUNCTION PANEL: CPT | Performed by: CLINICAL NURSE SPECIALIST

## 2018-02-24 PROCEDURE — 85048 AUTOMATED LEUKOCYTE COUNT: CPT | Performed by: CLINICAL NURSE SPECIALIST

## 2018-02-24 PROCEDURE — 36415 COLL VENOUS BLD VENIPUNCTURE: CPT | Performed by: CLINICAL NURSE SPECIALIST

## 2018-02-25 LAB
ALBUMIN SERPL-MCNC: 3.8 G/DL (ref 3.4–5)
ALP SERPL-CCNC: 70 U/L (ref 40–150)
ALT SERPL W P-5'-P-CCNC: 18 U/L (ref 0–50)
AST SERPL W P-5'-P-CCNC: 21 U/L (ref 0–45)
BILIRUB DIRECT SERPL-MCNC: 0.1 MG/DL (ref 0–0.2)
BILIRUB SERPL-MCNC: 0.6 MG/DL (ref 0.2–1.3)
PROT SERPL-MCNC: 8 G/DL (ref 6.8–8.8)
T4 FREE SERPL-MCNC: 1.19 NG/DL (ref 0.76–1.46)
TSH SERPL DL<=0.005 MIU/L-ACNC: 4.42 MU/L (ref 0.4–4)

## 2018-02-28 RX ORDER — METHIMAZOLE 5 MG/1
2.5 TABLET ORAL DAILY
Qty: 45 TABLET | Refills: 1 | OUTPATIENT
Start: 2018-02-28 | End: 2024-08-08

## 2018-02-28 NOTE — PROGRESS NOTES
Please call  Yunxia,  Your thyroid levels are a little low.  Please decrease Methimazole to 2.5 mg (1/2 tablet) per day.  We'll recheck levels again at your next follow up in March.  Glory Sorto NP  Endocrinology

## 2018-02-28 NOTE — PROGRESS NOTES
Please disregard previous message.  Her Methimazole dose was already decreased to 1/2 tablet per day at her recent office visit.  You do not need to call her.  Glory Sorto NP  Endocrinology

## 2018-03-23 ENCOUNTER — OFFICE VISIT (OUTPATIENT)
Dept: ENDOCRINOLOGY | Facility: CLINIC | Age: 43
End: 2018-03-23
Payer: COMMERCIAL

## 2018-03-23 ENCOUNTER — TELEPHONE (OUTPATIENT)
Dept: FAMILY MEDICINE | Facility: CLINIC | Age: 43
End: 2018-03-23

## 2018-03-23 VITALS
BODY MASS INDEX: 29.32 KG/M2 | HEIGHT: 62 IN | DIASTOLIC BLOOD PRESSURE: 70 MMHG | SYSTOLIC BLOOD PRESSURE: 102 MMHG | HEART RATE: 76 BPM | TEMPERATURE: 97.6 F | WEIGHT: 159.3 LBS

## 2018-03-23 DIAGNOSIS — E04.1 THYROID NODULE: ICD-10-CM

## 2018-03-23 DIAGNOSIS — E05.00 GRAVES DISEASE: ICD-10-CM

## 2018-03-23 DIAGNOSIS — E05.00 GRAVES DISEASE: Primary | ICD-10-CM

## 2018-03-23 DIAGNOSIS — E05.90 HYPERTHYROIDISM: Primary | ICD-10-CM

## 2018-03-23 PROCEDURE — 99214 OFFICE O/P EST MOD 30 MIN: CPT | Performed by: CLINICAL NURSE SPECIALIST

## 2018-03-23 NOTE — LETTER
3/23/2018         RE: Tamica Glass  [address] [facility] [id number]        Dear Colleague,    Thank you for referring your patient, Tamica Glass, to the [facility]. Please see a copy of my visit note below.    Name: Sunil Glass  F/u for Hyperthyroidism/Graves' disease (Last seen 12/22/2017).    HPI:  Sunil Glass is a 42 year old female who presents for the evaluation of Hyperthyroidism.  She comes in today accompanied by an .  She previously noted increased anxiety, fatigue, some difficulty swallowing, and racing heart.  labs done for further evaluation were significant for elevated free T4 with suppressed TSH.  No past history of thyroid disease.  No known FH of thyroid dysfunction or thyroid cancer.    TSI antibodies were elevated.  Thyroid uptake scan showed elevated 24-hour uptake of 85%  Thyroid ultrasound showed two thyroid nodules - 2.0 cm right nodule and a 0.8 cm left nodule.  Uptake scan showed a photopenic area of uptake in the right lobe corresponding to the 2.0 cm right nodule seen on ultrasound.  FNA biopsy of the 2.0 cm right nodule done 5/19/2017 was benign.    She started Methimazole 10 mg bid (5/12/2017).  Methimazole dose was decreased to 10 mg qd 7/2017 due to elevated TSH.  Methimazole dose was further decreased to 5 mg/day 8/2017.  Currently, she is treated with Methimazole 2.5 mg qd.  The dose was last reduced 12/2017.  She's here today for follow up.    Feels well for the most part, still noting some mild fatigue.    History of radiation exposure: NO  History of thyroid dysfunction: NO  Changes in weight: Yes, weight gain, 138 --> 153-->159 lbs.   PMH/PSH:  Past Medical History:   Diagnosis Date     Graves disease      History reviewed. No pertinent surgical history.  Family Hx:  History reviewed. No pertinent family history.  Thyroid disease: No         DM2:          Autoimmune: DM1, SLE, RA, Vitiligo No    Social Hx:  Social History     Social  "History     Marital status: Single     Spouse name: N/A     Number of children: N/A     Years of education: N/A     Occupational History     Not on file.     Social History Main Topics     Smoking status: Never Smoker     Smokeless tobacco: Never Used     Alcohol use No     Drug use: No     Sexual activity: No     Other Topics Concern     Not on file     Social History Narrative          MEDICATIONS:  has a current medication list which includes the following prescription(s): methimazole, cholecalciferol, fluticasone, and multiple vitamins-minerals.    ROS   ROS: 10 point ROS neg other than the symptoms noted above in the HPI.    Physical Exam   VS: /70 (BP Location: Left arm, Patient Position: Chair, Cuff Size: Adult Regular)  Pulse 76  Temp 97.6  F (36.4  C) (Oral)  Ht 1.575 m (5' 2\")  Wt 72.3 kg (159 lb 4.8 oz)  LMP 03/17/2018 (Exact Date)  Breastfeeding? No  BMI 29.14 kg/m2  GENERAL: AXOX3, NAD, well dressed, answering questions appropriately, appears stated age.  HEENT:  no exophthalmos, no proptosis, EOMI, no lig lag, no retraction  NECK:  Supple, thyroid gland diffusely enlarged, 2.0 cm right nodule palpable, nontender.  CV: RRR  LUNGS: Clear  ABDOMEN: soft, nontender, nondistended  EXTREMITIES: no edema, +pulses, no rashes, no lesions, no pretibial edema  NEUROLOGY: CN grossly intact, no fine tremor with outstretched hands.  MSK: grossly intact  SKIN: no rashes, no lesions    LABS:  TFTs:  !THYROID Latest Ref Rng & Units 2/24/2018 12/11/2017 11/7/2017   TSH 0.40 - 4.00 mU/L 4.42 (H) 4.02 (H) 3.00   T4 FREE 0.76 - 1.46 ng/dL 1.19 1.04 1.01     !THYROID Latest Ref Rng & Units 9/29/2017 8/15/2017 7/10/2017   TSH 0.40 - 4.00 mU/L 1.86 11.48 (H) 0.47   T4 FREE 0.76 - 1.46 ng/dL 1.04 0.77 0.52 (L)     !THYROID Latest Ref Rng & Units 6/7/2017 4/19/2017   TSH 0.40 - 4.00 mU/L <0.01 (L) <0.01 (L)   T4 FREE 0.76 - 1.46 ng/dL 1.50 (H) 3.02 (H)     Component    Latest Ref Rng & Units 4/26/2017   Free T3    " "2.3 - 4.2 pg/mL 13.0 (H)     Component    Latest Ref Rng & Units 4/26/2017   Thyroglobulin Antibody    <40 IU/mL <20   Thyroid Peroxidase Antibody    <35 IU/mL 138 (H)   Thyroid Stim Immunog     5.1 (H)     CBC:  Component    Latest Ref Rng & Units 4/26/2017   WBC    4.0 - 11.0 10e9/L 4.0   RBC Count    3.8 - 5.2 10e12/L 4.50   Hemoglobin    11.7 - 15.7 g/dL 13.3   Hematocrit    35.0 - 47.0 % 39.8   MCV    78 - 100 fl 88   MCH    26.5 - 33.0 pg 29.6   MCHC    31.5 - 36.5 g/dL 33.4   RDW    10.0 - 15.0 % 11.7   Platelet Count    150 - 450 10e9/L 239     Component      Latest Ref Rng & Units 9/29/2017   WBC      4.0 - 11.0 10e9/L 7.1       Component    Latest Ref Rng & Units 4/26/2017   Sed Rate    0 - 20 mm/h 16     LFTs:  !LIPID/HEPATIC Latest Ref Rng & Units 9/29/2017   AST 0 - 45 U/L 13   ALT 0 - 50 U/L 25     Component      Latest Ref Rng & Units 11/7/2017   Vitamin D Deficiency screening      20 - 75 ug/L 26     Vital Signs 12/11/2017 12/22/2017 3/23/2018   Weight (LB) 153 lb 153 lb 12.8 oz 159 lb 4.8 oz   Height 5' 2\"  5' 2\"   BMI (Calculated) 28.04  29.2     ULTRASOUND THYROID       HISTORY: Thyrotoxicosis.     COMPARISON: None.   .  FINDINGS: The thyroid gland appears mildly enlarged. The right lobe of  the thyroid measures 5.6 x 2.2 x 2.1 cm. The left lobe of the thyroid  measures 5.6 x 2.1 x 2 cm. The isthmus measures 0.9 cm. Background  thyroid parenchymal echogenicity is mildly heterogeneous. The thyroid  gland also appears diffusely hypervascular.     Individual thyroid nodules are measured as follows:   1. Hyperechoic nodule in the inferior right thyroid lobe measures 2 x  1.5 x 1.8 cm.  2. Hyperechoic nodule in the superior left thyroid lobe measures 0.8 x  0.5 x 0.4 cm.      IMPRESSION:   1. The thyroid gland is mildly enlarged and diffusely hypervascular.  2. Two hyperechoic thyroid nodules are noted, with the largest on the  right measuring 2 cm.    ULTRASOUND THYROID  12/29/2017      INDICATION:  " Thyroid nodule.     COMPARISON: 5/4/2017.     FINDINGS: Ultrasound demonstrates the right thyroid lobe measures 5.9  x 2.0 x 1.8 cm. The left lobe measures 5.4 x 1.9 x 1.5 cm. The thyroid  is slightly heterogeneous and demonstrates increased vascularity on  color Doppler.     Thyroid nodules:  1. 2.2 x 1.7 x 1.6 cm complex, mostly solid, mildly hyperechoic nodule  right lower lobe. This has not significantly changed (previously  measuring 2.2 x 1.7 x 1.6 cm).  2. 0.7 x 0.5 x 0.4 cm hyperechoic nodule left upper lobe.     No significant change in the above nodules or new nodules  demonstrated.         IMPRESSION:  1. Stable exam which is mildly enlarged and hypervascular.  2. Two stable thyroid nodules. The largest in the right lobe was  previously biopsied.       NUCLEAR MEDICINE THYROID UPTAKE AND SCAN 5/10/2017      HISTORY: Thyrotoxicosis, unspecified without thyrotoxic crisis or  storm. Thyrotoxicosis with diffuse goiter without thyrotoxic crisis or  storm. Decreased white blood cell count, unspecified.      COMPARISON: Thyroid ultrasound 5/4/2017.     TECHNIQUE: Patient was given 234 uCi of I-123 orally followed by  24-hour uptake and scan.     FINDINGS: Thyroid gland is enlarged with fairly homogeneous uptake  throughout most of the thyroid. There is an area of photopenia in the  lower pole right thyroid lobe corresponding to the nodule seen on  recent thyroid ultrasound. Findings are suspicious for a cold nodule.  24-hour uptake is 85% which is well above normal limits.     Normal range is 10-30% 24-hour uptake.         IMPRESSION: Enlarged thyroid gland with photopenic area corresponding  to the large right lower lobe thyroid nodule on prior ultrasound.  Findings are concerning for a cold nodule which have an increased  incidence of malignancy. Follow-up percutaneous biopsy of this nodule  suggested for further assessment. 24-hour uptake is markedly increased  at 85% consistent with hyperthyroid  state.      Copath Report 05/19/2017 10:40 AM 88   Patient Name: SHANDA JIMENEZ   MR#: [id number]   Specimen #: [id number]   Collected: 5/19/2017   Received: 5/19/2017   Reported: 5/23/2017 09:24   Ordering Phy(s): RISA SORIANO   Additional Phy(s): SUSAN RACHELE HAASE     For improved result formatting, select 'View Enhanced Report Format',   under Linked Documents section.     SPECIMEN/STAIN PROCESS:   FNA-thyroid, right nodule        Pap-Cyto x 7, Garcia's stain-cyto x 2        CYTOLOGIC INTERPRETATION:      FNA-thyroid, right nodule:   Benign   Consistent with a benign nodule (includes adenomatoid nodule, colloid   nodule, etc.)     The [place] Implied Risk of Malignancy and Recommended Clinical   Management:   Benign has a 0-3% risk of malignancy, recommended management is clinical   follow-up     Specimen Adequacy: Satisfactory for evaluation.     All pertinent notes, labs, and images personally reviewed by me.     A/P  Ms.Yunxia Jimenez is a 42 year old here for the treatment/follow up of Grave's disease.    Currently treated with Methimazole 5 mg qd.    Has been treated with Methimazole x 6 months.  Recent TSH slightly elevat  Stop Methimazole.  Follow a reduced calorie diet, 5332-5171 calories per day for weight gain (24 lbs gain compared to normal baseline weight).  .  F/u in 3 months with labs priorl    Treatment of Graves' hyperthyroidism consists of amelioration of symptoms with a beta-blocker and measures aimed at decreasing thyroid hormone synthesis: the administration of a thionamide, radioiodine ablation, or surgery.  Thionamides -- Thionamides (methimazole and propylthiouracial (PTU)).  Methimazole is preferred because of its longer duration of action (once daily dosing) and has lower incidence of side effects.   PTU is preferred during pregnancy because of the potential teratogenic effects of methimazole.   The goal of therapy in Graves' hyperthyroidism is to be euthyroid within three to eight  weeks. This can be followed by ablative therapy with radioiodine or surgery or by continuation of the drug for a prolonged period (usually one to two years) with the hope of attaining a permanent remission. If long-term medical therapy is chosen, the dose of methimazole is then tapered to a maintenance dose with the goal of maintaining a euthyroid state.  Both MMI and PTU can cause pruritus, rash, urticaria, arthralgias, arthritis, fever, abnormal taste sensation, nausea, or vomiting in up to 13 percent of patients.  If one drug is not tolerated, the other drug can be substituted, but up to 50 percent of patients have cross-sensitivity. The gastrointestinal side effects are dose-dependent. Thus, patients taking higher doses of MMI should be started on divided doses.  Agranulocytosis is a rare but serious complication of thionamide therapy, with a prevalence of 0.2 to 0.5 percent, and usually occurs within the first two months of treatment. The risk of agranulocytosis is higher for antithyroid drugs than for 20 other classes of drugs associated with this rare complication.  Hepatotoxicity is a rare complication of thionamide therapy. Serum aminotransferase concentrations increase transiently in up to one-third of patients taking PTU.    Radioiodine ablation -- Radioiodine is widely used for the treatment of Graves' hyperthyroidism. It is the therapy of choice in the [place].  Radioiodine therapy may be associated with an increased risk of the development or worsening of Graves' ophthalmopathy.  Radioiodine is administered as a capsule and induces extensive tissue damage, resulting in ablation of the thyroid within 6 to 18 weeks.   Approximately 20 percent of patients fail the first radioiodine treatment and require a second or subsequent dose. These patients usually have more severe hyperthyroidism or larger goiters.  Suppressed TSH:  Differential for hyperthyroidism includes:  Graves' disease, thyroiditis, or  autonomous hyperfunctioning nodule.  An uptake and scan of her thyroid gland will help differentiate the diagnosis.  In Graves' disease her uptake will be homogeneous and increased, in thyroiditis her uptake will be low, and in an autonomous hyperfunctioing nodule the uptake will be increased in a focal area.      Thyroid-stimulating immunoglobulins (TSI) can be detected in the majority of patients (77.8%) with Graves  disease.  It can be used to predict relapse or remission when using PTU/methimazole or radioiodine.  These assays have also been advocated for use in patients with subclinical Graves  hyperthyroidism or patients with unilateral ophthalmopathy.    Thyrotoxicosis associated with subacute thyroiditis is usually mild and transient.  The   patient lacks the physical findings of long-standing thyrotoxicosis. The goiter is typically  painful and low or absent 131I uptake.  Usually the erythrocyte sedimentation rate (ESR) and CRP are greatly elevated, and the leukocyte count may also be increased. Antibody titers are low or negative.    2.  Thyroid nodules.  Thyroid nodules are common and are frequently benign. Data suggest that the prevalence of palpable thyroid nodules is 3% to 7% in [place]; the prevalence is as high as 50% based on ultrasonography (US) or autopsy data. All patients with a palpable thyroid nodule, however, should undergo US examination. US-guided FNA (US-FNA) is recommended for nodules ?10 mm; US-FNA is suggested for nodules <10 mm only if clinical information or US features are suspicious.    Causes of thyroid Nodules: Benign (Multinodular goiter, Hashimoto s thyroiditis, Simple or hemorrhagic cysts, Follicular adenomas, Subacute thyroiditis) or Malignant(Papillary carcinoma, Follicular carcinoma, Hürthle cell carcinoma, Medullary carcinoma, Anaplastic carcinoma, Primary thyroid lymphoma, or Metastatic malignant lesion).    MultiNodule:  The risk of cancer is not significantly  higher in palpable solitary thyroid nodules than in multinodular lesions or in nodules in diffuse goiters. In multinodular thyroid glands, the cytologic sampling should be focused on lesions characterized by suspicious US features rather than on larger or clinically dominant nodules.    Cyst:  Most complex thyroid nodules with a dominant fluid component are benign. USFNA, however, should always be performed because the rare papillary thyroid carcinoma (PTC) can be cystic. An unsatisfactory (nondiagnostic) specimen usually results from a cystic nodule that yields few or no follicular cells. Reaspiration yields satisfactory results in 50% of cases.    Fine-Needle Aspiration Cytologic Diagnosis: 70% of FNA specimens are classified as benign; in addition, 5% are malignant, 10% are suspicious, and 10% to 20% are nondiagnostic or unsatisfactory. At surgical intervention, about 20% of such indeterminate/suspicious specimens are found to be malignant lesions.    Despite good initial technique, repeated biopsy, and US-FNA, approximately 5% of nodules still remain nondiagnostic. Such thyroid nodules should be surgically excised.    FNA biopsy of the 2.0 cm right thyroid nodule done 5/19/2017 was benign.  Repeat thyroid/neck ultrasound 12/2017 stable.  Plan to repeat thyroid ultrasound 12/2018.      Previous D level was low.  Does not take any supplemental D.  Taking over the counter D 2000 IU daily.  Recent D level 26.    Labs ordered today:   No orders of the defined types were placed in this encounter.    Radiology/Consults ordered today: None    More than 50% of the time spent with Ms. Glass on counseling / coordinating her care.  Total face to face time was 20 minutes.      Follow-up:  3 months    Glory Sorto NP  Endocrinology  [facility]  CC: Haase, Susan Rachele          Again, thank you for allowing me to participate in the care of your patient.        Sincerely,        ARIELLE Flores CNP

## 2018-03-23 NOTE — PATIENT INSTRUCTIONS
Stop Methimazole     Try to follow a reduced calorie diet 2981-1805 calories per day.  Track your calorie intake using My Fitness Pal.    Follow up with me in 3 months.    Glory Sorto NP  Endocrinology

## 2018-03-23 NOTE — PROGRESS NOTES
Name: Sunil Glass  F/u for Hyperthyroidism/Graves' disease (Last seen 12/22/2017).    HPI:  Sunil Glass is a 42 year old female who presents for the evaluation of Hyperthyroidism.  She comes in today accompanied by an .  She previously noted increased anxiety, fatigue, some difficulty swallowing, and racing heart.  labs done for further evaluation were significant for elevated free T4 with suppressed TSH.  No past history of thyroid disease.  No known FH of thyroid dysfunction or thyroid cancer.    TSI antibodies were elevated.  Thyroid uptake scan showed elevated 24-hour uptake of 85%  Thyroid ultrasound showed two thyroid nodules - 2.0 cm right nodule and a 0.8 cm left nodule.  Uptake scan showed a photopenic area of uptake in the right lobe corresponding to the 2.0 cm right nodule seen on ultrasound.  FNA biopsy of the 2.0 cm right nodule done 5/19/2017 was benign.    She started Methimazole 10 mg bid (5/12/2017).  Methimazole dose was decreased to 10 mg qd 7/2017 due to elevated TSH.  Methimazole dose was further decreased to 5 mg/day 8/2017.  Currently, she is treated with Methimazole 2.5 mg qd.  The dose was last reduced 12/2017.  She's here today for follow up.    Feels well for the most part, still noting some mild fatigue.    History of radiation exposure: NO  History of thyroid dysfunction: NO  Changes in weight: Yes, weight gain, 138 --> 153-->159 lbs.   PMH/PSH:  Past Medical History:   Diagnosis Date     Graves disease      History reviewed. No pertinent surgical history.  Family Hx:  History reviewed. No pertinent family history.  Thyroid disease: No         DM2:          Autoimmune: DM1, SLE, RA, Vitiligo No    Social Hx:  Social History     Social History     Marital status: Single     Spouse name: N/A     Number of children: N/A     Years of education: N/A     Occupational History     Not on file.     Social History Main Topics     Smoking status: Never Smoker     Smokeless tobacco: Never  "Used     Alcohol use No     Drug use: No     Sexual activity: No     Other Topics Concern     Not on file     Social History Narrative          MEDICATIONS:  has a current medication list which includes the following prescription(s): methimazole, cholecalciferol, fluticasone, and multiple vitamins-minerals.    ROS   ROS: 10 point ROS neg other than the symptoms noted above in the HPI.    Physical Exam   VS: /70 (BP Location: Left arm, Patient Position: Chair, Cuff Size: Adult Regular)  Pulse 76  Temp 97.6  F (36.4  C) (Oral)  Ht 1.575 m (5' 2\")  Wt 72.3 kg (159 lb 4.8 oz)  LMP 03/17/2018 (Exact Date)  Breastfeeding? No  BMI 29.14 kg/m2  GENERAL: AXOX3, NAD, well dressed, answering questions appropriately, appears stated age.  HEENT:  no exophthalmos, no proptosis, EOMI, no lig lag, no retraction  NECK:  Supple, thyroid gland diffusely enlarged, 2.0 cm right nodule palpable, nontender.  CV: RRR  LUNGS: Clear  ABDOMEN: soft, nontender, nondistended  EXTREMITIES: no edema, +pulses, no rashes, no lesions, no pretibial edema  NEUROLOGY: CN grossly intact, no fine tremor with outstretched hands.  MSK: grossly intact  SKIN: no rashes, no lesions    LABS:  TFTs:  !THYROID Latest Ref Rng & Units 2/24/2018 12/11/2017 11/7/2017   TSH 0.40 - 4.00 mU/L 4.42 (H) 4.02 (H) 3.00   T4 FREE 0.76 - 1.46 ng/dL 1.19 1.04 1.01     !THYROID Latest Ref Rng & Units 9/29/2017 8/15/2017 7/10/2017   TSH 0.40 - 4.00 mU/L 1.86 11.48 (H) 0.47   T4 FREE 0.76 - 1.46 ng/dL 1.04 0.77 0.52 (L)     !THYROID Latest Ref Rng & Units 6/7/2017 4/19/2017   TSH 0.40 - 4.00 mU/L <0.01 (L) <0.01 (L)   T4 FREE 0.76 - 1.46 ng/dL 1.50 (H) 3.02 (H)     Component    Latest Ref Rng & Units 4/26/2017   Free T3    2.3 - 4.2 pg/mL 13.0 (H)     Component    Latest Ref Rng & Units 4/26/2017   Thyroglobulin Antibody    <40 IU/mL <20   Thyroid Peroxidase Antibody    <35 IU/mL 138 (H)   Thyroid Stim Immunog     5.1 (H)     CBC:  Component    Latest Ref Rng & " "Units 4/26/2017   WBC    4.0 - 11.0 10e9/L 4.0   RBC Count    3.8 - 5.2 10e12/L 4.50   Hemoglobin    11.7 - 15.7 g/dL 13.3   Hematocrit    35.0 - 47.0 % 39.8   MCV    78 - 100 fl 88   MCH    26.5 - 33.0 pg 29.6   MCHC    31.5 - 36.5 g/dL 33.4   RDW    10.0 - 15.0 % 11.7   Platelet Count    150 - 450 10e9/L 239     Component      Latest Ref Rng & Units 9/29/2017   WBC      4.0 - 11.0 10e9/L 7.1       Component    Latest Ref Rng & Units 4/26/2017   Sed Rate    0 - 20 mm/h 16     LFTs:  !LIPID/HEPATIC Latest Ref Rng & Units 9/29/2017   AST 0 - 45 U/L 13   ALT 0 - 50 U/L 25     Component      Latest Ref Rng & Units 11/7/2017   Vitamin D Deficiency screening      20 - 75 ug/L 26     Vital Signs 12/11/2017 12/22/2017 3/23/2018   Weight (LB) 153 lb 153 lb 12.8 oz 159 lb 4.8 oz   Height 5' 2\"  5' 2\"   BMI (Calculated) 28.04  29.2     ULTRASOUND THYROID       HISTORY: Thyrotoxicosis.     COMPARISON: None.   .  FINDINGS: The thyroid gland appears mildly enlarged. The right lobe of  the thyroid measures 5.6 x 2.2 x 2.1 cm. The left lobe of the thyroid  measures 5.6 x 2.1 x 2 cm. The isthmus measures 0.9 cm. Background  thyroid parenchymal echogenicity is mildly heterogeneous. The thyroid  gland also appears diffusely hypervascular.     Individual thyroid nodules are measured as follows:   1. Hyperechoic nodule in the inferior right thyroid lobe measures 2 x  1.5 x 1.8 cm.  2. Hyperechoic nodule in the superior left thyroid lobe measures 0.8 x  0.5 x 0.4 cm.      IMPRESSION:   1. The thyroid gland is mildly enlarged and diffusely hypervascular.  2. Two hyperechoic thyroid nodules are noted, with the largest on the  right measuring 2 cm.    ULTRASOUND THYROID  12/29/2017      INDICATION:  Thyroid nodule.     COMPARISON: 5/4/2017.     FINDINGS: Ultrasound demonstrates the right thyroid lobe measures 5.9  x 2.0 x 1.8 cm. The left lobe measures 5.4 x 1.9 x 1.5 cm. The thyroid  is slightly heterogeneous and demonstrates increased " vascularity on  color Doppler.     Thyroid nodules:  1. 2.2 x 1.7 x 1.6 cm complex, mostly solid, mildly hyperechoic nodule  right lower lobe. This has not significantly changed (previously  measuring 2.2 x 1.7 x 1.6 cm).  2. 0.7 x 0.5 x 0.4 cm hyperechoic nodule left upper lobe.     No significant change in the above nodules or new nodules  demonstrated.         IMPRESSION:  1. Stable exam which is mildly enlarged and hypervascular.  2. Two stable thyroid nodules. The largest in the right lobe was  previously biopsied.       NUCLEAR MEDICINE THYROID UPTAKE AND SCAN 5/10/2017      HISTORY: Thyrotoxicosis, unspecified without thyrotoxic crisis or  storm. Thyrotoxicosis with diffuse goiter without thyrotoxic crisis or  storm. Decreased white blood cell count, unspecified.      COMPARISON: Thyroid ultrasound 5/4/2017.     TECHNIQUE: Patient was given 234 uCi of I-123 orally followed by  24-hour uptake and scan.     FINDINGS: Thyroid gland is enlarged with fairly homogeneous uptake  throughout most of the thyroid. There is an area of photopenia in the  lower pole right thyroid lobe corresponding to the nodule seen on  recent thyroid ultrasound. Findings are suspicious for a cold nodule.  24-hour uptake is 85% which is well above normal limits.     Normal range is 10-30% 24-hour uptake.         IMPRESSION: Enlarged thyroid gland with photopenic area corresponding  to the large right lower lobe thyroid nodule on prior ultrasound.  Findings are concerning for a cold nodule which have an increased  incidence of malignancy. Follow-up percutaneous biopsy of this nodule  suggested for further assessment. 24-hour uptake is markedly increased  at 85% consistent with hyperthyroid state.      Copath Report 05/19/2017 10:40 AM 88   Patient Name: SHANDA JIMENEZ   MR#: [id number]   Specimen #: [id number]   Collected: 5/19/2017   Received: 5/19/2017   Reported: 5/23/2017 09:24   Ordering Phy(s): RISA SORIANO   Additional Phy(s):  SUSAN RACHELE HAASE     For improved result formatting, select 'View Enhanced Report Format',   under Linked Documents section.     SPECIMEN/STAIN PROCESS:   FNA-thyroid, right nodule        Pap-Cyto x 7, Garcia's stain-cyto x 2        CYTOLOGIC INTERPRETATION:      FNA-thyroid, right nodule:   Benign   Consistent with a benign nodule (includes adenomatoid nodule, colloid   nodule, etc.)     The [place] Implied Risk of Malignancy and Recommended Clinical   Management:   Benign has a 0-3% risk of malignancy, recommended management is clinical   follow-up     Specimen Adequacy: Satisfactory for evaluation.     All pertinent notes, labs, and images personally reviewed by me.     A/P  Ms.Yunxia Glass is a 42 year old here for the treatment/follow up of Grave's disease.    Currently treated with Methimazole 5 mg qd.    Has been treated with Methimazole x 6 months.  Recent TSH slightly elevat  Stop Methimazole.  Follow a reduced calorie diet, 7805-0627 calories per day for weight gain (24 lbs gain compared to normal baseline weight).  .  F/u in 3 months with labs priorl    Treatment of Graves' hyperthyroidism consists of amelioration of symptoms with a beta-blocker and measures aimed at decreasing thyroid hormone synthesis: the administration of a thionamide, radioiodine ablation, or surgery.  Thionamides -- Thionamides (methimazole and propylthiouracial (PTU)).  Methimazole is preferred because of its longer duration of action (once daily dosing) and has lower incidence of side effects.   PTU is preferred during pregnancy because of the potential teratogenic effects of methimazole.   The goal of therapy in Graves' hyperthyroidism is to be euthyroid within three to eight weeks. This can be followed by ablative therapy with radioiodine or surgery or by continuation of the drug for a prolonged period (usually one to two years) with the hope of attaining a permanent remission. If long-term medical therapy is chosen, the dose  of methimazole is then tapered to a maintenance dose with the goal of maintaining a euthyroid state.  Both MMI and PTU can cause pruritus, rash, urticaria, arthralgias, arthritis, fever, abnormal taste sensation, nausea, or vomiting in up to 13 percent of patients.  If one drug is not tolerated, the other drug can be substituted, but up to 50 percent of patients have cross-sensitivity. The gastrointestinal side effects are dose-dependent. Thus, patients taking higher doses of MMI should be started on divided doses.  Agranulocytosis is a rare but serious complication of thionamide therapy, with a prevalence of 0.2 to 0.5 percent, and usually occurs within the first two months of treatment. The risk of agranulocytosis is higher for antithyroid drugs than for 20 other classes of drugs associated with this rare complication.  Hepatotoxicity is a rare complication of thionamide therapy. Serum aminotransferase concentrations increase transiently in up to one-third of patients taking PTU.    Radioiodine ablation -- Radioiodine is widely used for the treatment of Graves' hyperthyroidism. It is the therapy of choice in the [place].  Radioiodine therapy may be associated with an increased risk of the development or worsening of Graves' ophthalmopathy.  Radioiodine is administered as a capsule and induces extensive tissue damage, resulting in ablation of the thyroid within 6 to 18 weeks.   Approximately 20 percent of patients fail the first radioiodine treatment and require a second or subsequent dose. These patients usually have more severe hyperthyroidism or larger goiters.  Suppressed TSH:  Differential for hyperthyroidism includes:  Graves' disease, thyroiditis, or autonomous hyperfunctioning nodule.  An uptake and scan of her thyroid gland will help differentiate the diagnosis.  In Graves' disease her uptake will be homogeneous and increased, in thyroiditis her uptake will be low, and in an autonomous  hyperfunctioing nodule the uptake will be increased in a focal area.      Thyroid-stimulating immunoglobulins (TSI) can be detected in the majority of patients (77.8%) with Graves  disease.  It can be used to predict relapse or remission when using PTU/methimazole or radioiodine.  These assays have also been advocated for use in patients with subclinical Graves  hyperthyroidism or patients with unilateral ophthalmopathy.    Thyrotoxicosis associated with subacute thyroiditis is usually mild and transient.  The   patient lacks the physical findings of long-standing thyrotoxicosis. The goiter is typically  painful and low or absent 131I uptake.  Usually the erythrocyte sedimentation rate (ESR) and CRP are greatly elevated, and the leukocyte count may also be increased. Antibody titers are low or negative.    2.  Thyroid nodules.  Thyroid nodules are common and are frequently benign. Data suggest that the prevalence of palpable thyroid nodules is 3% to 7% in [place]; the prevalence is as high as 50% based on ultrasonography (US) or autopsy data. All patients with a palpable thyroid nodule, however, should undergo US examination. US-guided FNA (US-FNA) is recommended for nodules ?10 mm; US-FNA is suggested for nodules <10 mm only if clinical information or US features are suspicious.    Causes of thyroid Nodules: Benign (Multinodular goiter, Hashimoto s thyroiditis, Simple or hemorrhagic cysts, Follicular adenomas, Subacute thyroiditis) or Malignant(Papillary carcinoma, Follicular carcinoma, Hürthle cell carcinoma, Medullary carcinoma, Anaplastic carcinoma, Primary thyroid lymphoma, or Metastatic malignant lesion).    MultiNodule:  The risk of cancer is not significantly higher in palpable solitary thyroid nodules than in multinodular lesions or in nodules in diffuse goiters. In multinodular thyroid glands, the cytologic sampling should be focused on lesions characterized by suspicious US features rather than on  larger or clinically dominant nodules.    Cyst:  Most complex thyroid nodules with a dominant fluid component are benign. USFNA, however, should always be performed because the rare papillary thyroid carcinoma (PTC) can be cystic. An unsatisfactory (nondiagnostic) specimen usually results from a cystic nodule that yields few or no follicular cells. Reaspiration yields satisfactory results in 50% of cases.    Fine-Needle Aspiration Cytologic Diagnosis: 70% of FNA specimens are classified as benign; in addition, 5% are malignant, 10% are suspicious, and 10% to 20% are nondiagnostic or unsatisfactory. At surgical intervention, about 20% of such indeterminate/suspicious specimens are found to be malignant lesions.    Despite good initial technique, repeated biopsy, and US-FNA, approximately 5% of nodules still remain nondiagnostic. Such thyroid nodules should be surgically excised.    FNA biopsy of the 2.0 cm right thyroid nodule done 5/19/2017 was benign.  Repeat thyroid/neck ultrasound 12/2017 stable.  Plan to repeat thyroid ultrasound 12/2018.      Previous D level was low.  Does not take any supplemental D.  Taking over the counter D 2000 IU daily.  Recent D level 26.    Labs ordered today:   No orders of the defined types were placed in this encounter.    Radiology/Consults ordered today: None    More than 50% of the time spent with Ms. Glass on counseling / coordinating her care.  Total face to face time was 20 minutes.      Follow-up:  3 months    Glory Sorto NP  Endocrinology  [facility]  CC: Haase, Susan Rachele

## 2018-03-23 NOTE — TELEPHONE ENCOUNTER
Reason for Call: Request for an order or referral:    Order or referral being requested: Thyroid    Date needed: 6/15/2018    Has the patient been seen by the PCP for this problem? YES    Additional comments: Pt wishes to have thyroid test a week before her appt with Glory Sorto    Phone number Patient can be reached at:      Best Time:      Can we leave a detailed message on this number?  YES    Call taken on 3/23/2018 at 1:07 PM by TRAVIS BENJAMIN

## 2018-03-23 NOTE — MR AVS SNAPSHOT
After Visit Summary   3/23/2018    Tamica Glass    MRN: [id number]           Patient Information     Date Of Birth          1975        Visit Information        Provider Department      3/23/2018 7:15 AM Glory Sorto APRN CNP; [employer] [facility]        Care Instructions        Stop Methimazole     Try to follow a reduced calorie diet 7511-5937 calories per day.  Track your calorie intake using My Fitness Pal.    Follow up with me in 3 months.    Glory Sorto NP  Endocrinology            Follow-ups after your visit        Your next 10 appointments already scheduled     Mar 27, 2018 10:30 AM CDT   [facility]ELLE Flores C[facility]   [facility]    [address] [place] [id number] [phone]            Apr 24, 2018  8:00 AM CDT   PHYSICAL with Barbara Carrillo,    [facility]    [address] [place] [id number] [phone]              Who to contact     If you have questions or need follow up information about today's clinic visit or your schedule please contact [facility] directly at [phone].  Normal or non-critical lab and imaging results will be communicated to you by MyChart, letter or phone within 4 business days after the clinic has received the results. If you do not hear from us within 7 days, please contact the clinic through [employer] or phone. If you have a critical or abnormal lab result, we will notify you by phone as soon as possible.  Submit refill requests through [employer] or call your pharmacy and they will forward the refill request to us. Please allow 3 business days for your refill to be completed.          Additional Information About Your Visit        [employer] Information     [employer] lets you send messages to your doctor, view your test results, renew your  "prescriptions, schedule appointments and more. To sign up, go to [email or web address] . Click on \"Log in\" on the left side of the screen, which will take you to the Welcome page. Then click on \"Sign up Now\" on the right side of the page.     You will be asked to enter the access code listed below, as well as some personal information. Please follow the directions to create your username and password.     Your access code is: [email or web address]  Expires: 2018  7:53 AM     Your access code will  in 90 days. If you need help or a new code, please call your [facility] clinic or [phone].        Care EveryWhere ID     This is your Care EveryWhere ID. This could be used by other organizations to access your [facility] medical records  [id number]        Your Vitals Were     Pulse Temperature Height Last Period Breastfeeding? BMI (Body Mass Index)    76 97.6  F (36.4  C) (Oral) 1.575 m (5' 2\") 2018 (Exact Date) No 29.14 kg/m2       Blood Pressure from Last 3 Encounters:   18 102/70   17 120/83   17 137/89    Weight from Last 3 Encounters:   18 72.3 kg (159 lb 4.8 oz)   17 69.8 kg (153 lb 12.8 oz)   17 69.4 kg (153 lb)              Today, you had the following     No orders found for display       Primary Care Provider Office Phone # Fax #    Barbara Janis DO [place] [phone] [address] [facility] [address]        Equal Access to Services     JOSE M ALONSO : Hadii sami Robin, chuy maurer, qaybta kaalmada nathalia olivia. So [facility] [phone].    ATENCIÓN: Si habla español, tiene a allison disposición servicios gratuitos de asistencia lingüística. Llame al [phone].    We comply with applicable federal civil rights laws and [place] laws. We do not discriminate on the basis of race, color, national origin, age, disability, sex, sexual orientation, or gender identity.            Thank you!     " Thank you for choosing [facility]  for your care. Our goal is always to provide you with excellent care. Hearing back from our patients is one way we can continue to improve our services. Please take a few minutes to complete the written survey that you may receive in the mail after your visit with us. Thank you!             Your Updated Medication List - Protect others around you: Learn how to safely use, store and throw away your medicines at www.disposemymeds.org.          This list is accurate as of 3/23/18  7:53 AM.  Always use your most recent med list.                   Brand Name Dispense Instructions for use Diagnosis    fluticasone 50 MCG/ACT spray    FLONASE    16 g    Spray 1-2 sprays into both nostrils daily    Viral sore throat       methimazole 5 MG tablet    TAPAZOLE    45 tablet    Take 0.5 tablets (2.5 mg) by mouth daily    Graves disease       MULTIVITAMIN PO           VITAMIN D (CHOLECALCIFEROL) PO      Take 2,000 Units by mouth daily

## 2018-03-26 NOTE — TELEPHONE ENCOUNTER
Glory, this patient is requesting Thyroid labs prior to her appointment on 3/27/18.  No orders pending currently, last T4 Free 2/24/18: 1.19 and last TSH 2/24/18: 4.42.  Patient taking Tapazole 2.5 mg QD.  Please advise  William Neff MA

## 2018-03-27 NOTE — TELEPHONE ENCOUNTER
I called the pt and she said that she saw LS last week and was told at that time that she does not need to do labs again for a while.  Shari Schoenberger, CMA

## 2018-03-27 NOTE — TELEPHONE ENCOUNTER
I placed lab orders. Please let the patient know she can do labs before her appointment.  Glory Sorto NP  Endocrinology

## 2018-05-02 ENCOUNTER — OFFICE VISIT (OUTPATIENT)
Dept: FAMILY MEDICINE | Facility: CLINIC | Age: 43
End: 2018-05-02
Payer: COMMERCIAL

## 2018-05-02 VITALS
HEIGHT: 61 IN | HEART RATE: 60 BPM | DIASTOLIC BLOOD PRESSURE: 70 MMHG | RESPIRATION RATE: 12 BRPM | WEIGHT: 156 LBS | SYSTOLIC BLOOD PRESSURE: 94 MMHG | BODY MASS INDEX: 29.45 KG/M2 | TEMPERATURE: 97.6 F | OXYGEN SATURATION: 99 %

## 2018-05-02 DIAGNOSIS — Z12.31 ENCOUNTER FOR SCREENING MAMMOGRAM FOR BREAST CANCER: ICD-10-CM

## 2018-05-02 DIAGNOSIS — E05.00 GRAVES DISEASE: Primary | ICD-10-CM

## 2018-05-02 DIAGNOSIS — B37.2 CANDIDIASIS OF SKIN: ICD-10-CM

## 2018-05-02 DIAGNOSIS — Z00.00 ROUTINE HISTORY AND PHYSICAL EXAMINATION OF ADULT: ICD-10-CM

## 2018-05-02 LAB
ALBUMIN SERPL-MCNC: 3.8 G/DL (ref 3.4–5)
ALP SERPL-CCNC: 52 U/L (ref 40–150)
ALT SERPL W P-5'-P-CCNC: 23 U/L (ref 0–50)
ANION GAP SERPL CALCULATED.3IONS-SCNC: 9 MMOL/L (ref 3–14)
AST SERPL W P-5'-P-CCNC: 15 U/L (ref 0–45)
BILIRUB SERPL-MCNC: 0.4 MG/DL (ref 0.2–1.3)
BUN SERPL-MCNC: 14 MG/DL (ref 7–30)
CALCIUM SERPL-MCNC: 8.7 MG/DL (ref 8.5–10.1)
CHLORIDE SERPL-SCNC: 108 MMOL/L (ref 94–109)
CHOLEST SERPL-MCNC: 184 MG/DL
CO2 SERPL-SCNC: 23 MMOL/L (ref 20–32)
CREAT SERPL-MCNC: 0.52 MG/DL (ref 0.52–1.04)
GFR SERPL CREATININE-BSD FRML MDRD: >90 ML/MIN/1.7M2
GLUCOSE SERPL-MCNC: 95 MG/DL (ref 70–99)
HDLC SERPL-MCNC: 58 MG/DL
LDLC SERPL CALC-MCNC: 111 MG/DL
NONHDLC SERPL-MCNC: 126 MG/DL
POTASSIUM SERPL-SCNC: 3.9 MMOL/L (ref 3.4–5.3)
PROT SERPL-MCNC: 7.9 G/DL (ref 6.8–8.8)
SODIUM SERPL-SCNC: 140 MMOL/L (ref 133–144)
T3FREE SERPL-MCNC: 2.7 PG/ML (ref 2.3–4.2)
T4 FREE SERPL-MCNC: 1.13 NG/DL (ref 0.76–1.46)
TRIGL SERPL-MCNC: 73 MG/DL
TSH SERPL DL<=0.005 MIU/L-ACNC: 2.55 MU/L (ref 0.4–4)

## 2018-05-02 PROCEDURE — 84443 ASSAY THYROID STIM HORMONE: CPT | Performed by: FAMILY MEDICINE

## 2018-05-02 PROCEDURE — 99396 PREV VISIT EST AGE 40-64: CPT | Performed by: FAMILY MEDICINE

## 2018-05-02 PROCEDURE — 80053 COMPREHEN METABOLIC PANEL: CPT | Performed by: FAMILY MEDICINE

## 2018-05-02 PROCEDURE — 36415 COLL VENOUS BLD VENIPUNCTURE: CPT | Performed by: FAMILY MEDICINE

## 2018-05-02 PROCEDURE — 84481 FREE ASSAY (FT-3): CPT | Performed by: FAMILY MEDICINE

## 2018-05-02 PROCEDURE — 80061 LIPID PANEL: CPT | Performed by: FAMILY MEDICINE

## 2018-05-02 PROCEDURE — 84439 ASSAY OF FREE THYROXINE: CPT | Performed by: FAMILY MEDICINE

## 2018-05-02 RX ORDER — NYSTATIN 100000 U/G
CREAM TOPICAL 3 TIMES DAILY
Qty: 30 G | Refills: 1 | Status: SHIPPED | OUTPATIENT
Start: 2018-05-02 | End: 2018-05-05

## 2018-05-02 NOTE — PROGRESS NOTES
SUBJECTIVE:   CC: Tamica Glass is an 43 year old woman who presents for preventive health visit.     Physical   Annual:     Getting at least 3 servings of Calcium per day::  NO    Bi-annual eye exam::  Yes    Dental care twice a year::  Yes    Sleep apnea or symptoms of sleep apnea::  None    Diet::  Low fat/cholesterol    Frequency of exercise::  1 day/week    Duration of exercise::  45-60 minutes    Taking medications regularly::  Not Applicable    Additional concerns today::  No                Today's PHQ-2 Score:   PHQ-2 ( 1999 [employer]) 5/2/2018   Q1: Little interest or pleasure in doing things 0   Q2: Feeling down, depressed or hopeless 0   PHQ-2 Score 0   Q1: Little interest or pleasure in doing things Not at all   Q2: Feeling down, depressed or hopeless Not at all   PHQ-2 Score 0       Abuse: Current or Past(Physical, Sexual or Emotional)- No  Do you feel safe in your environment - Yes    Social History   Substance Use Topics     Smoking status: Never Smoker     Smokeless tobacco: Never Used     Alcohol use No     Alcohol Use 5/2/2018   If you drink alcohol do you typically have greater than 3 drinks per day OR greater than 7 drinks per week? No   No flowsheet data found.    Reviewed orders with patient.  Reviewed health maintenance and updated orders accordingly - Yes  Patient Active Problem List   Diagnosis     Hyperthyroidism     Graves disease     Thyroid nodule     History reviewed. No pertinent surgical history.    Social History   Substance Use Topics     Smoking status: Never Smoker     Smokeless tobacco: Never Used     Alcohol use No     History reviewed. No pertinent family history.        Patient under age 50, mutual decision reflected in health maintenance.      Pertinent mammograms are reviewed under the imaging tab.  History of abnormal Pap smear: NO - age 30- 65 PAP every 3 years recommended  NO - age 30-65 PAP every 5 years with negative HPV co-testing recommended    Reviewed and updated as  "needed this visit by clinical staff  Tobacco  Allergies  Meds  Med Hx  Surg Hx  Fam Hx  Soc Hx        Reviewed and updated as needed this visit by Provider            Review of Systems  CONSTITUTIONAL: NEGATIVE for fever, chills, change in weight  INTEGUMENTARU/SKIN: NEGATIVE for worrisome rashes, moles or lesions  EYES: NEGATIVE for vision changes or irritation  ENT: NEGATIVE for ear, mouth and throat problems  RESP: NEGATIVE for significant cough or SOB  BREAST: NEGATIVE for masses, tenderness or discharge  CV: NEGATIVE for chest pain, palpitations or peripheral edema  GI: NEGATIVE for nausea, abdominal pain, heartburn, or change in bowel habits  : NEGATIVE for unusual urinary or vaginal symptoms. Periods are regular.  MUSCULOSKELETAL: NEGATIVE for significant arthralgias or myalgia  NEURO: NEGATIVE for weakness, dizziness or paresthesias  PSYCHIATRIC: NEGATIVE for changes in mood or affect     OBJECTIVE:   BP 94/70 (BP Location: Right arm, Patient Position: Chair, Cuff Size: Adult Regular)  Pulse 60  Temp 97.6  F (36.4  C) (Oral)  Resp 12  Ht 5' 1.25\" (1.556 m)  Wt 156 lb (70.8 kg)  SpO2 99%  BMI 29.24 kg/m2  Physical Exam  GENERAL: healthy, alert and no distress  EYES: Eyes grossly normal to inspection, PERRL and conjunctivae and sclerae normal  HENT: ear canals and TM's normal, nose and mouth without ulcers or lesions  NECK: no adenopathy, no asymmetry, masses, or scars and thyroid normal to palpation  RESP: lungs clear to auscultation - no rales, rhonchi or wheezes  BREAST: normal without masses, tenderness or nipple discharge and no palpable axillary masses or adenopathy  CV: regular rate and rhythm, normal S1 S2, no S3 or S4, no murmur, click or rub, no peripheral edema and peripheral pulses strong  ABDOMEN: soft, nontender, no hepatosplenomegaly, no masses and bowel sounds normal  MS: no gross musculoskeletal defects noted, no edema  SKIN: Hyperpigmented rash right axilla   NEURO: Normal " "strength and tone, mentation intact and speech normal  PSYCH: mentation appears normal, affect normal/bright    ASSESSMENT/PLAN:   1. Routine history and physical examination of adult  - Lipid panel reflex to direct LDL Fasting  - Comprehensive metabolic panel    2. Graves disease  - Patient follows with endocrine  - TSH  - T4 free  - T3 Free    3. Encounter for screening mammogram for breast cancer  - *MA Screening Digital Bilateral; Future    4. Candidiasis of skin  - Left axilla   - nystatin (MYCOSTATIN) cream; Apply topically 3 times daily for 14 days  Dispense: 30 g; Refill: 1    COUNSELING:  Reviewed preventive health counseling, as reflected in patient instructions         reports that she has never smoked. She has never used smokeless tobacco.    Estimated body mass index is 29.24 kg/(m^2) as calculated from the following:    Height as of this encounter: 5' 1.25\" (1.556 m).    Weight as of this encounter: 156 lb (70.8 kg).       Counseling Resources:  ATP IV Guidelines  Pooled Cohorts Equation Calculator  Breast Cancer Risk Calculator  FRAX Risk Assessment  ICSI Preventive Guidelines  Dietary Guidelines for Americans, 2010  USDA's MyPlate  ASA Prophylaxis  Lung CA Screening    Barbara Carrillo DO  [facility]  Answers for HPI/ROS submitted by the patient on 5/2/2018   PHQ-2 Score: 0    "

## 2018-05-02 NOTE — LETTER
May 3, 2018      Tamica Glass  [address] [address]        Dear ,    We are writing to inform you of your test results.    Your test results fall within the expected range(s) or remain unchanged from previous results.  Please continue with current treatment plan.    Resulted Orders   Lipid panel reflex to direct LDL Fasting   Result Value Ref Range    Cholesterol 184 <200 mg/dL    Triglycerides 73 <150 mg/dL      Comment:      Fasting specimen    HDL Cholesterol 58 >49 mg/dL    LDL Cholesterol Calculated 111 (H) <100 mg/dL      Comment:      Above desirable:  100-129 mg/dl  Borderline High:  130-159 mg/dL  High:             160-189 mg/dL  Very high:       >189 mg/dl      Non HDL Cholesterol 126 <130 mg/dL   Comprehensive metabolic panel   Result Value Ref Range    Sodium 140 133 - 144 mmol/L    Potassium 3.9 3.4 - 5.3 mmol/L    Chloride 108 94 - 109 mmol/L    Carbon Dioxide 23 20 - 32 mmol/L    Anion Gap 9 3 - 14 mmol/L    Glucose 95 70 - 99 mg/dL      Comment:      Fasting specimen    Urea Nitrogen 14 7 - 30 mg/dL    Creatinine 0.52 0.52 - 1.04 mg/dL    GFR Estimate >90 >60 mL/min/1.7m2      Comment:      Non  GFR Calc    GFR Estimate If Black >90 >60 mL/min/1.7m2      Comment:       GFR Calc    Calcium 8.7 8.5 - 10.1 mg/dL    Bilirubin Total 0.4 0.2 - 1.3 mg/dL    Albumin 3.8 3.4 - 5.0 g/dL    Protein Total 7.9 6.8 - 8.8 g/dL    Alkaline Phosphatase 52 40 - 150 U/L    ALT 23 0 - 50 U/L    AST 15 0 - 45 U/L   TSH   Result Value Ref Range    TSH 2.55 0.40 - 4.00 mU/L   T4 free   Result Value Ref Range    T4 Free 1.13 0.76 - 1.46 ng/dL   T3 Free   Result Value Ref Range    Free T3 2.7 2.3 - 4.2 pg/mL       If you have any questions or concerns, please call the clinic at the number listed above.       Sincerely,        Barbara Carrillo, DO

## 2018-05-05 ENCOUNTER — OFFICE VISIT (OUTPATIENT)
Dept: FAMILY MEDICINE | Facility: CLINIC | Age: 43
End: 2018-05-05
Payer: COMMERCIAL

## 2018-05-05 VITALS
HEIGHT: 62 IN | DIASTOLIC BLOOD PRESSURE: 70 MMHG | HEART RATE: 64 BPM | WEIGHT: 156.6 LBS | SYSTOLIC BLOOD PRESSURE: 102 MMHG | TEMPERATURE: 97.8 F | RESPIRATION RATE: 14 BRPM | BODY MASS INDEX: 28.82 KG/M2

## 2018-05-05 DIAGNOSIS — H57.89 IRRITATION OF LEFT EYE: Primary | ICD-10-CM

## 2018-05-05 PROCEDURE — 99213 OFFICE O/P EST LOW 20 MIN: CPT | Performed by: FAMILY MEDICINE

## 2018-05-05 NOTE — MR AVS SNAPSHOT
"              After Visit Summary   5/5/2018    Tamica Glass    MRN: [id number]           Patient Information     Date Of Birth          1975        Visit Information        Provider Department      5/5/2018 11:15 AM Garret Galindo MD; [employer] [facility]         Follow-ups after your visit        Your next 10 appointments already scheduled     Jun 09, 2018 11:00 AM CDT   MA SCREENING DIGITAL BILATERAL with CRMA1   [facility]    [address] [place] [id number] [phone]           Do not use any powder, lotion or deodorant under your arms or on your breast. If you do, we will ask you to remove it before your exam.  Wear comfortable, two-piece clothing.  If you have any allergies, tell your care team.  Bring any previous mammograms from other facilities or have them mailed to the breast center. Three-dimensional (3D) mammograms are available at [facility] locations in [facility], [facility], [facility], and [place]. Health locations include [facility] [facility] in [place]. Benefits of 3D mammograms include: - Improved rate of cancer detection - Decreases your chance of having to go back for more tests, which means fewer: - \"False-positive\" results (This means that there is an abnormal area but it isn't cancer.) - Invasive testing procedures, such as a biopsy or surgery - Can provide clearer images of the breast if you have dense breast tissue. 3D mammography is an optional exam that anyone can have with a 2D mammogram. It doesn't replace or take the place of a 2D mammogram. 2D mammograms remain an effective screening test for all women.  Not all insurance companies cover the cost of a 3D mammogram. Check with your insurance.            Joel 15, 2018  8:00 AM CDT   LAB with CR LAB   [facility] ([facility]" "[facility]    [address] [address] [facility] [id number] [phone]           Please do not eat 10-12 hours before your appointment if you are coming in fasting for labs on lipids, cholesterol, or glucose (sugar). This does not apply to pregnant women. Water, hot tea and black coffee (with nothing added) are okay. Do not drink other fluids, diet soda or chew gum.            Jun 22, 2018 10:30 AM CDT   [facility]    [id number] [place] [address] [phone]              Who to contact     If you have questions or need follow up information about today's clinic visit or your schedule please contact [facility] directly at [phone].  Normal or non-critical lab and imaging results will be communicated to you by MyChart, letter or phone within 4 business days after the clinic has received the results. If you do not hear from us within 7 days, please contact the clinic through MyChart or phone. If you have a critical or abnormal lab result, we will notify you by phone as soon as possible.  Submit refill requests through [employer] or call your pharmacy and they will forward the refill request to us. Please allow 3 business days for your refill to be completed.          Additional Information About Your Visit        MyChart Information     [employer] lets you send messages to your doctor, view your test results, renew your prescriptions, schedule appointments and more. To sign up, go to [email or web address] . Click on \"Log in\" on the left side of the screen, which will take you to the Welcome page. Then click on \"Sign up Now\" on the right side of the page.     You will be asked to enter the access code listed below, as well as some personal information. Please follow the directions to create your username and password.     Your access code is: [email or web address]  Expires: 6/21/2018  " "7:53 AM     Your access code will  in 90 days. If you need help or a new code, please call your [facility] or [phone].        Care EveryWhere ID     This is your Care EveryWhere ID. This could be used by other organizations to access your [facility] medical records  [id number]        Your Vitals Were     Pulse Temperature Respirations Height Last Period Breastfeeding?    64 97.8  F (36.6  C) (Oral) 14 5' 2\" (1.575 m) 04/10/2018 (Approximate) Yes    BMI (Body Mass Index)                   28.64 kg/m2            Blood Pressure from Last 3 Encounters:   18 102/70   18 94/70   18 102/70    Weight from Last 3 Encounters:   18 156 lb 9.6 oz (71 kg)   18 156 lb (70.8 kg)   18 159 lb 4.8 oz (72.3 kg)              Today, you had the following     No orders found for display       Primary Care Provider Office Phone # Fax #    Barbara Bruce DO [place] [phone] [address] [id number]        Equal Access to Services     JOSE M ALONSO AH: Hadii aad ku hadasho Soomaali, waaxda luqadaha, qaybta kaalmada adeegyada, nathalia peng. So [facility] [phone].    ATENCIÓN: Si habla español, tiene a allison disposición servicios gratuitos de asistencia lingüística. Nasima al [phone].    We comply with applicable federal civil rights laws and [place] laws. We do not discriminate on the basis of race, color, national origin, age, disability, sex, sexual orientation, or gender identity.            Thank you!     Thank you for choosing [facility]  for your care. Our goal is always to provide you with excellent care. Hearing back from our patients is one way we can continue to improve our services. Please take a few minutes to complete the written survey that you may receive in the mail after your visit with us. Thank you!             Your Updated Medication List - Protect others around you: Learn how to safely use, " store and throw away your medicines at www.disposemymeds.org.      Notice  As of 5/5/2018 11:31 AM    You have not been prescribed any medications.

## 2018-05-05 NOTE — PROGRESS NOTES
"  SUBJECTIVE:   Tamica Glass is a 43 year old female who presents to clinic today for the following health issues:      Eye(s) Problem  Onset: 4 days    Description:   Location: bilateral left worse  Pain: no   Redness: no     Accompanying Signs & Symptoms:  Discharge/mattering: YES- yesterday  Swelling: YES  Visual changes: no   Fever: no   Nasal Congestion: no   Bothered by bright lights: YES    History:   Trauma: YES- maybe from make up  Foreign body exposure: YES eye shadow    Precipitating factors:   Wearing contacts: no     Alleviating factors:  Improved by: none    Therapies Tried and outcome: warm compresses    Problem list and histories reviewed & adjusted, as indicated.  Additional history: as documented    Reviewed and updated as needed this visit by clinical staff  Tobacco  Allergies  Meds  Med Hx  Surg Hx  Fam Hx  Soc Hx      Reviewed and updated as needed this visit by Provider         ROS:  EYES: no vision change, no pain or headache    OBJECTIVE:                                                    /70 (BP Location: Right arm, Patient Position: Chair, Cuff Size: Adult Large)  Pulse 64  Temp 97.8  F (36.6  C) (Oral)  Resp 14  Ht 5' 2\" (1.575 m)  Wt 156 lb 9.6 oz (71 kg)  LMP 04/10/2018 (Approximate)  Breastfeeding? Yes  BMI 28.64 kg/m2  Body mass index is 28.64 kg/(m^2).  GENERAL APPEARANCE: healthy, alert and no distress  EYES: left eye mild chemosis lower lid, no conjunctival injection, PERRL, EOMI, normal vision grossly, no foreign body noted, fluorescein exam normal without ulceration of corneal abrasion     ASSESSMENT/PLAN:                                                    1. Irritation of left eye  Appears to just have mild irritation from chemical without corneal abrasion or current foreign body noted at this time.  No issue of ulceration from Graves ophthalmopathy noted.  Follow-up next week if not improved with eye drops with lubricant and/or antihistamine.      Garret Dietz " MD [facility]

## 2018-06-09 ENCOUNTER — RADIANT APPOINTMENT (OUTPATIENT)
Dept: MAMMOGRAPHY | Facility: CLINIC | Age: 43
End: 2018-06-09
Attending: FAMILY MEDICINE
Payer: COMMERCIAL

## 2018-06-09 DIAGNOSIS — Z12.31 ENCOUNTER FOR SCREENING MAMMOGRAM FOR BREAST CANCER: ICD-10-CM

## 2018-06-09 PROCEDURE — 77067 SCR MAMMO BI INCL CAD: CPT | Mod: TC

## 2018-06-15 DIAGNOSIS — E05.00 GRAVES DISEASE: ICD-10-CM

## 2018-06-15 DIAGNOSIS — E05.90 HYPERTHYROIDISM: ICD-10-CM

## 2018-06-15 LAB
ALBUMIN SERPL-MCNC: 3.6 G/DL (ref 3.4–5)
ALP SERPL-CCNC: 58 U/L (ref 40–150)
ALT SERPL W P-5'-P-CCNC: 19 U/L (ref 0–50)
AST SERPL W P-5'-P-CCNC: 12 U/L (ref 0–45)
BILIRUB DIRECT SERPL-MCNC: 0.1 MG/DL (ref 0–0.2)
BILIRUB SERPL-MCNC: 0.4 MG/DL (ref 0.2–1.3)
PROT SERPL-MCNC: 7.8 G/DL (ref 6.8–8.8)
T4 FREE SERPL-MCNC: 1.11 NG/DL (ref 0.76–1.46)
TSH SERPL DL<=0.005 MIU/L-ACNC: 3.41 MU/L (ref 0.4–4)
WBC # BLD AUTO: 4.5 10E9/L (ref 4–11)

## 2018-06-15 PROCEDURE — 85048 AUTOMATED LEUKOCYTE COUNT: CPT | Performed by: CLINICAL NURSE SPECIALIST

## 2018-06-15 PROCEDURE — 36415 COLL VENOUS BLD VENIPUNCTURE: CPT | Performed by: CLINICAL NURSE SPECIALIST

## 2018-06-15 PROCEDURE — 80076 HEPATIC FUNCTION PANEL: CPT | Performed by: CLINICAL NURSE SPECIALIST

## 2018-06-15 PROCEDURE — 84443 ASSAY THYROID STIM HORMONE: CPT | Performed by: CLINICAL NURSE SPECIALIST

## 2018-06-15 PROCEDURE — 84439 ASSAY OF FREE THYROXINE: CPT | Performed by: CLINICAL NURSE SPECIALIST

## 2018-06-18 NOTE — PROGRESS NOTES
Please call  Thyroid labs are now normal.  Is she still taking the methimazole 1/2 tab per day?  Please let me know.  Glory Sorto NP  Endocrinology

## 2018-06-18 NOTE — PROGRESS NOTES
Thyroid levels are normal.  If she is off methimazole, no need to follow up or repeat labs for one year as long as she continues to feel well.  Glory Sorto NP  Endocrinology

## 2018-08-06 ENCOUNTER — TELEPHONE (OUTPATIENT)
Dept: ENDOCRINOLOGY | Facility: CLINIC | Age: 43
End: 2018-08-06

## 2018-08-06 DIAGNOSIS — E05.00 GRAVES DISEASE: Primary | ICD-10-CM

## 2018-08-06 NOTE — TELEPHONE ENCOUNTER
Reason for Call:  Other call back    Detailed comments: Patient is having trouble swallowing and thinks its related to her thyroid, would like to speak with nurse or Glory Sorto to see what is going on. Please use  services to call patient to discess.     Phone Number Patient can be reached at: Home number on file [phone] (home)    Best Time: any     Can we leave a detailed message on this number? YES    Call taken on 8/6/2018 at 4:46 PM by Raquel Rondon

## 2018-08-08 NOTE — TELEPHONE ENCOUNTER
Spoke with Patient via Mandarin .    Patient states she feels like there is something stuck in her throat x 2 wks.  Has not choked.  And able to swallow both liquid and solid foods.  Unsure if her thyroid is swollen or not.    Has been off Methimazole x 6 months.    Re-check labs?    Next step?    Please advise, thanks.

## 2018-08-09 NOTE — TELEPHONE ENCOUNTER
Yes recheck labs and obtain thyroid ultrasound and schedule a follow up appointment.  Thanks,  Glory Sorto NP  Endocrinology

## 2018-08-09 NOTE — TELEPHONE ENCOUNTER
Patient called back without .  Called patient back using   Services.  Scheduled lab for tomorrow at 3 pm, gave # for scheduling U/S and scheduled her with Glory 10/26/18 7 am.  Ginna Pierce RN

## 2018-08-10 DIAGNOSIS — E05.00 GRAVES DISEASE: ICD-10-CM

## 2018-08-10 LAB
T4 FREE SERPL-MCNC: 1.05 NG/DL (ref 0.76–1.46)
TSH SERPL DL<=0.005 MIU/L-ACNC: 2.16 MU/L (ref 0.4–4)

## 2018-08-10 PROCEDURE — 84439 ASSAY OF FREE THYROXINE: CPT | Performed by: CLINICAL NURSE SPECIALIST

## 2018-08-10 PROCEDURE — 84443 ASSAY THYROID STIM HORMONE: CPT | Performed by: CLINICAL NURSE SPECIALIST

## 2018-08-10 PROCEDURE — 36415 COLL VENOUS BLD VENIPUNCTURE: CPT | Performed by: CLINICAL NURSE SPECIALIST

## 2018-08-11 NOTE — PROGRESS NOTES
Please call -  Yunxia,  Your thyroid levels are in normal range.  You do not need to take any thyroid medication at this time.  I'll let you know the thyroid ultrasound results when available.  Please keep your upcoming appointment 10/26 even if everything is normal just for a recheck incase symptoms continue.  Glory Sorto NP  Endocrinology

## 2018-08-17 ENCOUNTER — HOSPITAL ENCOUNTER (OUTPATIENT)
Dept: ULTRASOUND IMAGING | Facility: CLINIC | Age: 43
Discharge: HOME | End: 2018-08-17
Attending: CLINICAL NURSE SPECIALIST | Admitting: CLINICAL NURSE SPECIALIST
Payer: COMMERCIAL

## 2018-08-17 DIAGNOSIS — E05.00 GRAVES DISEASE: ICD-10-CM

## 2018-08-17 PROCEDURE — 76536 US EXAM OF HEAD AND NECK: CPT

## 2018-08-19 NOTE — PROGRESS NOTES
Please call  Yunxia,  Your thyroid ultrasound is stable - there has not been any increase in your thyroid gland size or size of either of your two thyroid nodules.  Thyroid labs were also in normal range.  If your difficulty swallowing continues, I would see your primary care provider because it is not due to your thyroid condition.  Let me know if you have any questions.  Otherwise I'll see you at your next appointment on 10/26.  Glory Sorto NP  Endocrinology

## 2018-08-21 ENCOUNTER — TELEPHONE (OUTPATIENT)
Dept: ENDOCRINOLOGY | Facility: CLINIC | Age: 43
End: 2018-08-21

## 2018-08-21 NOTE — TELEPHONE ENCOUNTER
Called patient using [facility]  Services.  Advised of below result note.  Patient agrees with plan.  Ginna Pierce RN    Notes Recorded by Ginna Pierce RN on 8/21/2018 at 10:21 AM  See telephone encounter.  Ginna Pierce RN    ------    Notes Recorded by Glory Sorto APRN CNP on 8/19/2018 at 12:02 PM  Please call  Tamica,  Your thyroid ultrasound is stable - there has not been any increase in your thyroid gland size or size of either of your two thyroid nodules.  Thyroid labs were also in normal range.  If your difficulty swallowing continues, I would see your primary care provider because it is not due to your thyroid condition.  Let me know if you have any questions.  Otherwise I'll see you at your next appointment on 10/26.  Glory Sorto NP  Endocrinology    Notes Recorded by Seble Quintanilla CMA on 8/20/2018 at 1:48 PM  Spoke with the patient using a phone .  All results and recommendations given.  Seble Quintanilla M.A.  ------    Notes Recorded by Ginna Pierce RN on 8/13/2018 at 9:30 AM  No notes recorded by provider  ------    Notes Recorded by Glory Sorto APRN CNP on 8/11/2018 at 10:23 AM  Please call -  Tamica,  Your thyroid levels are in normal range.  You do not need to take any thyroid medication at this time.  I'll let you know the thyroid ultrasound results when available.  Please keep your upcoming appointment 10/26 even if everything is normal just for a recheck incase symptoms continue.  Glory Sorto NP  Endocrinology

## 2018-08-28 ENCOUNTER — OFFICE VISIT (OUTPATIENT)
Dept: FAMILY MEDICINE | Facility: CLINIC | Age: 43
End: 2018-08-28
Payer: COMMERCIAL

## 2018-08-28 VITALS
RESPIRATION RATE: 16 BRPM | TEMPERATURE: 97.9 F | SYSTOLIC BLOOD PRESSURE: 106 MMHG | DIASTOLIC BLOOD PRESSURE: 66 MMHG | HEART RATE: 72 BPM | OXYGEN SATURATION: 98 %

## 2018-08-28 DIAGNOSIS — Z80.0 FAMILY HISTORY OF GASTRIC CANCER: ICD-10-CM

## 2018-08-28 DIAGNOSIS — E05.90 HYPERTHYROIDISM: ICD-10-CM

## 2018-08-28 DIAGNOSIS — K21.9 GASTROESOPHAGEAL REFLUX DISEASE, ESOPHAGITIS PRESENCE NOT SPECIFIED: Primary | ICD-10-CM

## 2018-08-28 PROCEDURE — 99214 OFFICE O/P EST MOD 30 MIN: CPT | Performed by: FAMILY MEDICINE

## 2018-08-28 RX ORDER — OMEPRAZOLE 40 MG/1
40 CAPSULE, DELAYED RELEASE ORAL DAILY
Qty: 14 CAPSULE | Refills: 0 | Status: SHIPPED | OUTPATIENT
Start: 2018-08-28 | End: 2018-08-28

## 2018-08-28 RX ORDER — OMEPRAZOLE 40 MG/1
40 CAPSULE, DELAYED RELEASE ORAL DAILY
Qty: 30 CAPSULE | Refills: 0 | Status: SHIPPED | OUTPATIENT
Start: 2018-08-28 | End: 2018-09-25

## 2018-08-28 NOTE — MR AVS SNAPSHOT
After Visit Summary   8/28/2018    Tamica Glass    MRN: [id number]           Patient Information     Date Of Birth          1975        Visit Information        Provider Department      8/28/2018 11:05 AM Barbara Carrillo DO; [employer] [facility]        Today's Diagnoses     Gastroesophageal reflux disease, esophagitis presence not specified    -  1    Family history of gastric cancer        Hyperthyroidism          Care Instructions      Tips to Control Acid Reflux    To control acid reflux, you ll need to make some basic diet and lifestyle changes. The simple steps outlined below may be all you ll need to ease discomfort.  Watch what you eat    Avoid fatty foods and spicy foods.    Eat fewer acidic foods, such as citrus and tomato-based foods. These can increase symptoms.    Limit drinking alcohol, caffeine, and fizzy beverages. All increase acid reflux.    Try limiting chocolate, peppermint, and spearmint. These can worsen acid reflux in some people.  Watch when you eat    Avoid lying down for 3 hours after eating.    Do not snack before going to bed.  Raise your head  Raising your head and upper body by 4 to 6 inches helps limit reflux when you re lying down. Put blocks under the head of your bed frame to raise it.  Other changes    Lose weight, if you need to    Don t exercise near bedtime    Avoid tight-fitting clothes    Limit aspirin and ibuprofen    Stop smoking   Date Last Reviewed: 7/1/2016 2000-2017 [employer]. [address] [place] [address], [place] [address]. All rights reserved. This information is not intended as a substitute for professional medical care. Always follow your healthcare professional's instructions.                Follow-ups after your visit        Follow-up notes from your care team     Return in about 4 weeks (around 9/25/2018) for Follow up regarding throat issues.      Your next 10 appointments already scheduled      Oct 26, 2018  7:00 AM CDT   [facility]ELLE Flores CNP   [facility]    [address]  [facility] [address] [phone]              Future tests that were ordered for you today     Open Future Orders        Priority Expected Expires Ordered    H Pylori antigen stool Routine  9/27/2018 8/28/2018            Who to contact     If you have questions or need follow up information about today's clinic visit or your schedule please contact [facility] directly at [phone].  Normal or non-critical lab and imaging results will be communicated to you by MyChart, letter or phone within 4 business days after the clinic has received the results. If you do not hear from us within 7 days, please contact the clinic through MyChart or phone. If you have a critical or abnormal lab result, we will notify you by phone as soon as possible.  Submit refill requests through [employer] or call your pharmacy and they will forward the refill request to us. Please allow 3 business days for your refill to be completed.          Additional Information About Your Visit        Care EveryWhere ID     This is your Care EveryWhere ID. This could be used by other organizations to access your [place] medical records  [id number]        Your Vitals Were     Pulse Temperature Respirations Last Period Pulse Oximetry Breastfeeding?    72 97.9  F (36.6  C) (Oral) 16 08/15/2018 98% No       Blood Pressure from Last 3 Encounters:   08/28/18 106/66   05/05/18 102/70   05/02/18 94/70    Weight from Last 3 Encounters:   05/05/18 156 lb 9.6 oz (71 kg)   05/02/18 156 lb (70.8 kg)   03/23/18 159 lb 4.8 oz (72.3 kg)                 Today's Medication Changes          These changes are accurate as of 8/28/18 11:40 AM.  If you have any questions, ask your nurse or doctor.               Start taking these medicines.        Dose/Directions    omeprazole 40 MG  capsule   Commonly known as:  priLOSEC   Used for:  Gastroesophageal reflux disease, esophagitis presence not specified   Started by:  Barbara Carrillo DO        Dose:  40 mg   Take 1 capsule (40 mg) by mouth daily Take 30-60 minutes before a meal.   Quantity:  30 capsule   Refills:  0            Where to get your medicines      These medications were sent to [facility] [address]  [address] [facility] [address]     Phone:  [phone]     omeprazole 40 MG capsule                Primary Care Provider Office Phone # Fax #    Barbara Carrillo [phone] [address] [place] [address]        Equal Access to Services     ROMAN ALONSO : Hadii sami montgomeryo Sorachel, waaxda luqadaha, qaybta kaalmada adeegyada, nathalia peng. So [facility] [phone].    ATENCIÓN: Si habla español, tiene a allison disposición servicios gratuitos de asistencia lingüística. [facility] [phone].    We comply with applicable federal civil rights laws and [place] laws. We do not discriminate on the basis of race, color, national origin, age, disability, sex, sexual orientation, or gender identity.            Thank you!     Thank you for choosing [facility]  for your care. Our goal is always to provide you with excellent care. Hearing back from our patients is one way we can continue to improve our services. Please take a few minutes to complete the written survey that you may receive in the mail after your visit with us. Thank you!             Your Updated Medication List - Protect others around you: Learn how to safely use, store and throw away your medicines at www.disposemymeds.org.          This list is accurate as of 8/28/18 11:40 AM.  Always use your most recent med list.                   Brand Name Dispense Instructions for use Diagnosis    omeprazole 40 MG capsule    priLOSEC    30 capsule    Take 1  capsule (40 mg) by mouth daily Take 30-60 minutes before a meal.    Gastroesophageal reflux disease, esophagitis presence not specified

## 2018-08-28 NOTE — PATIENT INSTRUCTIONS
Tips to Control Acid Reflux    To control acid reflux, you ll need to make some basic diet and lifestyle changes. The simple steps outlined below may be all you ll need to ease discomfort.  Watch what you eat    Avoid fatty foods and spicy foods.    Eat fewer acidic foods, such as citrus and tomato-based foods. These can increase symptoms.    Limit drinking alcohol, caffeine, and fizzy beverages. All increase acid reflux.    Try limiting chocolate, peppermint, and spearmint. These can worsen acid reflux in some people.  Watch when you eat    Avoid lying down for 3 hours after eating.    Do not snack before going to bed.  Raise your head  Raising your head and upper body by 4 to 6 inches helps limit reflux when you re lying down. Put blocks under the head of your bed frame to raise it.  Other changes    Lose weight, if you need to    Don t exercise near bedtime    Avoid tight-fitting clothes    Limit aspirin and ibuprofen    Stop smoking   Date Last Reviewed: 7/1/2016 2000-2017 [employer]. [address] [place] [address], [place] [address]. All rights reserved. This information is not intended as a substitute for professional medical care. Always follow your healthcare professional's instructions.

## 2018-08-28 NOTE — PROGRESS NOTES
"  SUBJECTIVE:   Tamica Glass is a 43 year old female who presents to clinic today for the following health issues:      Acute Illness   Acute illness concerns: throat feel dry and sore  Onset: 1 month    Fever: no    Chills/Sweats: no    Headache (location?): no    Sinus Pressure:no    Conjunctivitis:  no    Ear Pain: no    Rhinorrhea: no    Congestion: no    Sore Throat: YES- feels like there is something stuck in throat     Cough: no    Wheeze: no    Decreased Appetite: YES    Nausea: no    Vomiting: no    Diarrhea:  no    Dysuria/Freq.: no    Fatigue/Achiness: YES- fatigue    Sick/Strep Exposure: no     Therapies Tried and outcome: none    For the past month, pt has noticed some mild pain and \"dryness\" when swallowing saliva.  She has no trouble with food or liquids.  Her father and grandmother have a history of gastric cancer.  She has had issues with acid reflux for years.  She has tried Zantac in the past and it was helpful, but recently it has not been working.  She has never tried a PPI.      Problem list and histories reviewed & adjusted, as indicated.  Additional history: as documented    Patient Active Problem List   Diagnosis     Hyperthyroidism     Graves disease     Thyroid nodule     Family history of gastric cancer     History reviewed. No pertinent surgical history.    Social History   Substance Use Topics     Smoking status: Never Smoker     Smokeless tobacco: Never Used     Alcohol use No     History reviewed. No pertinent family history.        Reviewed and updated as needed this visit by clinical staff  Tobacco  Allergies  Meds  Med Hx  Surg Hx  Fam Hx  Soc Hx      Reviewed and updated as needed this visit by Provider         ROS:  Constitutional, HEENT, cardiovascular, pulmonary, gi and gu systems are negative, except as otherwise noted.    OBJECTIVE:     /66 (BP Location: Right arm, Patient Position: Sitting, Cuff Size: Adult Regular)  Pulse 72  Temp 97.9  F (36.6  C) (Oral)  " Resp 16  LMP 08/15/2018  SpO2 98%  Breastfeeding? No  There is no height or weight on file to calculate BMI.  GENERAL: healthy, alert and no distress  HENT: oropharynx clear and oral mucous membranes moist  NECK: no adenopathy, no asymmetry, masses, or scars and thyromegaly approximately 2 times normal  RESP: lungs clear to auscultation - no rales, rhonchi or wheezes  CV: regular rate and rhythm, normal S1 S2, no S3 or S4, no murmur, click or rub, no peripheral edema and peripheral pulses strong  ABDOMEN: soft, nontender, no hepatosplenomegaly, no masses and bowel sounds normal    ASSESSMENT/PLAN:       ICD-10-CM    1. Gastroesophageal reflux disease, esophagitis presence not specified K21.9 H Pylori antigen stool     omeprazole (PRILOSEC) 40 MG capsule     DISCONTINUED: omeprazole (PRILOSEC) 40 MG capsule   2. Family history of gastric cancer Z80.0 H Pylori antigen stool   3. Hyperthyroidism E05.90      History of hyperthyroidism, but recent labs an US have been stable.  Suspect symptoms are from GERD.  Will get Hpylori testing done and then have her start a PPI.  Follow up in 1 month and if symptoms are not improved will get an EGD.      Barbara Carrillo, DO  [facility]

## 2018-08-30 DIAGNOSIS — Z80.0 FAMILY HISTORY OF GASTRIC CANCER: ICD-10-CM

## 2018-08-30 DIAGNOSIS — K21.9 GASTROESOPHAGEAL REFLUX DISEASE, ESOPHAGITIS PRESENCE NOT SPECIFIED: ICD-10-CM

## 2018-08-30 PROCEDURE — 87338 HPYLORI STOOL AG IA: CPT | Performed by: FAMILY MEDICINE

## 2018-08-31 DIAGNOSIS — B96.81 HELICOBACTER POSITIVE GASTRITIS: Primary | ICD-10-CM

## 2018-08-31 DIAGNOSIS — K29.70 HELICOBACTER POSITIVE GASTRITIS: Primary | ICD-10-CM

## 2018-08-31 LAB
H PYLORI AG STL QL IA: ABNORMAL
SPECIMEN SOURCE: ABNORMAL

## 2018-08-31 RX ORDER — AMOXICILLIN 500 MG/1
1000 CAPSULE ORAL 2 TIMES DAILY
Qty: 56 CAPSULE | Refills: 0 | Status: SHIPPED | OUTPATIENT
Start: 2018-08-31 | End: 2018-09-25

## 2018-08-31 RX ORDER — OMEPRAZOLE 40 MG/1
40 CAPSULE, DELAYED RELEASE ORAL DAILY
Qty: 14 CAPSULE | Refills: 0 | Status: SHIPPED | OUTPATIENT
Start: 2018-08-31 | End: 2018-09-14

## 2018-08-31 RX ORDER — CLARITHROMYCIN 500 MG
500 TABLET ORAL 2 TIMES DAILY
Qty: 28 TABLET | Refills: 0 | Status: SHIPPED | OUTPATIENT
Start: 2018-08-31 | End: 2018-09-25

## 2018-08-31 NOTE — PROGRESS NOTES
Please let pt know that her h pylori testing is positive.  I will send in a combination of antibiotics and antacids to treat this infection.  She should return to the clinic after she is done with treatment

## 2018-09-25 ENCOUNTER — OFFICE VISIT (OUTPATIENT)
Dept: FAMILY MEDICINE | Facility: CLINIC | Age: 43
End: 2018-09-25
Payer: COMMERCIAL

## 2018-09-25 VITALS
OXYGEN SATURATION: 100 % | HEART RATE: 73 BPM | DIASTOLIC BLOOD PRESSURE: 77 MMHG | TEMPERATURE: 97.4 F | BODY MASS INDEX: 28.48 KG/M2 | SYSTOLIC BLOOD PRESSURE: 117 MMHG | WEIGHT: 155.7 LBS

## 2018-09-25 DIAGNOSIS — K29.70 HELICOBACTER POSITIVE GASTRITIS: ICD-10-CM

## 2018-09-25 DIAGNOSIS — B96.81 HELICOBACTER POSITIVE GASTRITIS: ICD-10-CM

## 2018-09-25 PROCEDURE — 99214 OFFICE O/P EST MOD 30 MIN: CPT | Performed by: FAMILY MEDICINE

## 2018-09-25 RX ORDER — CLARITHROMYCIN 500 MG
500 TABLET ORAL 2 TIMES DAILY
Qty: 28 TABLET | Refills: 0 | Status: SHIPPED | OUTPATIENT
Start: 2018-09-25 | End: 2018-10-09

## 2018-09-25 RX ORDER — AMOXICILLIN 500 MG/1
1000 CAPSULE ORAL 2 TIMES DAILY
Qty: 56 CAPSULE | Refills: 0 | Status: SHIPPED | OUTPATIENT
Start: 2018-09-25 | End: 2018-10-09

## 2018-09-25 NOTE — MR AVS SNAPSHOT
"              After Visit Summary   9/25/2018    Tamica Glass    MRN: [id number]           Patient Information     Date Of Birth          1975        Visit Information        Provider Department      9/25/2018 11:15 AM Barbara Carrillo DO; [employer] [facility]        Today's Diagnoses     Helicobacter positive gastritis          Care Instructions    Take all four medications twice daily for 14 days          Follow-ups after your visit        Follow-up notes from your care team     Return in about 3 months (around 12/25/2018) for Come back in 3 months for repeat stool testing.      Your next 10 appointments already scheduled     Oct 26, 2018  7:00 AM CDT   Return Visit with ARIELLE Flores CNP   [facility] ([facility]    [address]  [facility] [address] [phone]              Who to contact     If you have questions or need follow up information about today's clinic visit or your schedule please contact [facility] directly at [phone].  Normal or non-critical lab and imaging results will be communicated to you by MyChart, letter or phone within 4 business days after the clinic has received the results. If you do not hear from us within 7 days, please contact the clinic through MyChart or phone. If you have a critical or abnormal lab result, we will notify you by phone as soon as possible.  Submit refill requests through [employer] or call your pharmacy and they will forward the refill request to us. Please allow 3 business days for your refill to be completed.          Additional Information About Your Visit        MyChart Information     [employer] lets you send messages to your doctor, view your test results, renew your prescriptions, schedule appointments and more. To sign up, go to [email or web address] . Click on \"Log in\" on the left side of the screen, which will take you to the " "Welcome page. Then click on \"Sign up Now\" on the right side of the page.     You will be asked to enter the access code listed below, as well as some personal information. Please follow the directions to create your username and password.     Your access code is: [email or web address]  Expires: 2018 11:37 AM     Your access code will  in 90 days. If you need help or a new code, please call your [facility] clinic or [phone].        Care EveryWhere ID     This is your Care EveryWhere ID. This could be used by other organizations to access your [facility] medical records  [id number]        Your Vitals Were     Pulse Temperature Last Period Pulse Oximetry Breastfeeding? BMI (Body Mass Index)    73 97.4  F (36.3  C) (Oral) 08/15/2018 (Approximate) 100% No 28.48 kg/m2       Blood Pressure from Last 3 Encounters:   18 117/77   18 106/66   18 102/70    Weight from Last 3 Encounters:   18 155 lb 11.2 oz (70.6 kg)   18 156 lb 9.6 oz (71 kg)   18 156 lb (70.8 kg)              Today, you had the following     No orders found for display         Today's Medication Changes          These changes are accurate as of 18 11:37 AM.  If you have any questions, ask your nurse or doctor.               Start taking these medicines.        Dose/Directions    amoxicillin 500 MG capsule   Commonly known as:  AMOXIL   Used for:  Helicobacter positive gastritis   Started by:  Barbara Carrillo DO        Dose:  1000 mg   Take 2 capsules (1,000 mg) by mouth 2 times daily for 14 days   Quantity:  56 capsule   Refills:  0       bismuth subsalicylate 262 MG Tabs   Used for:  Helicobacter positive gastritis   Started by:  Barbara Carrillo DO        Dose:  1 tablet   Take 1 tablet by mouth 2 times daily for 14 days   Quantity:  28 tablet   Refills:  0       clarithromycin 500 MG tablet   Commonly known as:  BIAXIN   Used for:  Helicobacter positive gastritis   Started by:  Barbara Carrillo DO     "    Dose:  500 mg   Take 1 tablet (500 mg) by mouth 2 times daily for 14 days   Quantity:  28 tablet   Refills:  0         These medicines have changed or have updated prescriptions.        Dose/Directions    omeprazole 20 MG CR capsule   Commonly known as:  priLOSEC   This may have changed:    - medication strength  - how much to take  - when to take this  - additional instructions   Used for:  Helicobacter positive gastritis   Changed by:  Barbara Carrillo DO        Dose:  20 mg   Take 1 capsule (20 mg) by mouth 2 times daily for 14 days   Quantity:  28 capsule   Refills:  0            Where to get your medicines      These medications were sent to [facility] [address]  [address] [facility] [address]     Phone:  [phone]     amoxicillin 500 MG capsule    bismuth subsalicylate 262 MG Tabs    clarithromycin 500 MG tablet    omeprazole 20 MG CR capsule                Primary Care Provider Office Phone # Fax #    Barbara Carrillo [phone] [address] [facility] [address]        Equal Access to Services     [facility]: Hadii sami cottrell hadasho Soomaali, waaxda luqadaha, qaybta kaalmada adeegyada, waxay maureen hayheavenly bartlett . So [facility] [phone].    ATENCIÓN: Si habla español, tiene a allison disposición servicios gratuitos de asistencia lingüística. Llame al [phone].    We comply with applicable federal civil rights laws and [place] laws. We do not discriminate on the basis of race, color, national origin, age, disability, sex, sexual orientation, or gender identity.            Thank you!     Thank you for choosing [facility]  for your care. Our goal is always to provide you with excellent care. Hearing back from our patients is one way we can continue to improve our services. Please take a few minutes to complete the written survey that you may receive in the mail after your visit with us.  Thank you!             Your Updated Medication List - Protect others around you: Learn how to safely use, store and throw away your medicines at www.disposemymeds.org.          This list is accurate as of 9/25/18 11:37 AM.  Always use your most recent med list.                   Brand Name Dispense Instructions for use Diagnosis    amoxicillin 500 MG capsule    AMOXIL    56 capsule    Take 2 capsules (1,000 mg) by mouth 2 times daily for 14 days    Helicobacter positive gastritis       bismuth subsalicylate 262 MG Tabs     28 tablet    Take 1 tablet by mouth 2 times daily for 14 days    Helicobacter positive gastritis       clarithromycin 500 MG tablet    BIAXIN    28 tablet    Take 1 tablet (500 mg) by mouth 2 times daily for 14 days    Helicobacter positive gastritis       omeprazole 20 MG CR capsule    priLOSEC    28 capsule    Take 1 capsule (20 mg) by mouth 2 times daily for 14 days    Helicobacter positive gastritis

## 2018-09-25 NOTE — PROGRESS NOTES
SUBJECTIVE:   Tamica Glass is a 43 year old female who presents to clinic today for the following health issues:    Concern - H Pylori  Onset:     Description:       Intensity: severe    Progression of Symptoms:  worsening    Accompanying Signs & Symptoms:  fullness    Previous history of similar problem:       Precipitating factors:   Worsened by: not eating enough or eating to much    Alleviating factors:  Improved by: eating a little bit    Therapies Tried and outcome:  pain after taking antibiotics     Patient was diagnosed with helicobacter pylori gastritis on 8/30 and started on triple therapy (Amox, clarithromycin).  She ended up taking the abx without the PPI due to a miscommunication and the abx caused a lot of stomach upset, so she stopped them early.      Problem list and histories reviewed & adjusted, as indicated.  Additional history: as documented    Patient Active Problem List   Diagnosis     Hyperthyroidism     Graves disease     Thyroid nodule     Family history of gastric cancer     Helicobacter positive gastritis     History reviewed. No pertinent surgical history.    Social History   Substance Use Topics     Smoking status: Never Smoker     Smokeless tobacco: Never Used     Alcohol use No     History reviewed. No pertinent family history.        Reviewed and updated as needed this visit by clinical staff  Tobacco  Allergies  Meds  Med Hx  Surg Hx  Fam Hx  Soc Hx      Reviewed and updated as needed this visit by Provider         ROS:  Constitutional, HEENT, cardiovascular, pulmonary, gi and gu systems are negative, except as otherwise noted.    OBJECTIVE:     /77 (BP Location: Left arm, Patient Position: Sitting, Cuff Size: Adult Regular)  Pulse 73  Temp 97.4  F (36.3  C) (Oral)  Wt 155 lb 11.2 oz (70.6 kg)  LMP 08/15/2018 (Approximate)  SpO2 100%  Breastfeeding? No  BMI 28.48 kg/m2  Body mass index is 28.48 kg/(m^2).  GENERAL: healthy, alert and no distress  ABDOMEN: soft,  nontender, no hepatosplenomegaly, no masses and bowel sounds normal    ASSESSMENT/PLAN:     1. Helicobacter positive gastritis  - Inadequately treated with triple therapy due to abx side effects  - Discussed different options for treatment.  Patient wants to avoid taking meds QID  - Will do the same abx regimen again, but add Bismuth to hopefully help with her GI side effects   - amoxicillin (AMOXIL) 500 MG capsule; Take 2 capsules (1,000 mg) by mouth 2 times daily for 14 days  Dispense: 56 capsule; Refill: 0  - clarithromycin (BIAXIN) 500 MG tablet; Take 1 tablet (500 mg) by mouth 2 times daily for 14 days  Dispense: 28 tablet; Refill: 0  - omeprazole (PRILOSEC) 20 MG CR capsule; Take 1 capsule (20 mg) by mouth 2 times daily for 14 days  Dispense: 28 capsule; Refill: 0  - bismuth subsalicylate 262 MG TABS; Take 1 tablet by mouth 2 times daily for 14 days  Dispense: 28 tablet; Refill: 0    Patient should get repeat Hpylori testing in 3 months     Barbara Carrillo,   [facility]

## 2018-10-11 ENCOUNTER — TELEPHONE (OUTPATIENT)
Dept: FAMILY MEDICINE | Facility: CLINIC | Age: 43
End: 2018-10-11

## 2018-10-11 DIAGNOSIS — K29.70 HELICOBACTER POSITIVE GASTRITIS: Primary | ICD-10-CM

## 2018-10-11 DIAGNOSIS — B96.81 HELICOBACTER POSITIVE GASTRITIS: Primary | ICD-10-CM

## 2018-10-11 NOTE — TELEPHONE ENCOUNTER
See note below, pt wants to confirm if can do lab only or if needs to see you? Get lab only first and follow with appointment? Ok for MP next week, routed, sent to scheduling to inform pt via interpretor    Return in about 3 months (around 12/25/2018) for Come back in 3 months for repeat stool testing  after she is done with treatment      Telephone Information:   Mobile [phone]     Odette Lance RN, BSN  Message handled by Nurse Triage.

## 2018-10-11 NOTE — TELEPHONE ENCOUNTER
Pt & Princess called regarding 3 F/u for January. Please place order for stool H-Pylori. Pt will  at end of Dec beginning of Jan. If OV needed in 3 months please inform pt    Alexandru Torres   10/11/18 12:22 PM

## 2018-10-15 NOTE — TELEPHONE ENCOUNTER
Routed to bronze TC, please call pt via interpretor to schedule lab only appointment  Odette Lance RN, BSN  Message handled by Nurse Triage.

## 2018-10-16 NOTE — TELEPHONE ENCOUNTER
Pt informed via interp over the phone, she will stop in and  kit at lab and knows it has to be return for testing  Lorena Jean/

## 2018-10-22 DIAGNOSIS — B96.81 HELICOBACTER POSITIVE GASTRITIS: ICD-10-CM

## 2018-10-22 DIAGNOSIS — K29.70 HELICOBACTER POSITIVE GASTRITIS: ICD-10-CM

## 2018-10-22 PROCEDURE — 87338 HPYLORI STOOL AG IA: CPT | Performed by: FAMILY MEDICINE

## 2018-10-22 NOTE — LETTER
October 24, 2018      Tamica Glass  [address] [facility] [address]        Dear ,    We are writing to inform you of your test results.    H Pylori test is negative.    Resulted Orders   H Pylori antigen stool   Result Value Ref Range    Specimen Description Feces     H Pylori Antigen       Negative for Helicobacter pylori antigen by enzyme immunoassay. A negative result   indicates the absence of H. pylori antigen or that the level of antigen is below the level   of detection.         If you have any questions or concerns, please call the clinic at the number listed above.       Sincerely,      Barbara Carrillo MD/AL

## 2018-10-23 LAB
H PYLORI AG STL QL IA: NORMAL
SPECIMEN SOURCE: NORMAL

## 2018-11-03 ENCOUNTER — OFFICE VISIT (OUTPATIENT)
Dept: FAMILY MEDICINE | Facility: CLINIC | Age: 43
End: 2018-11-03
Payer: COMMERCIAL

## 2018-11-03 VITALS
TEMPERATURE: 97.9 F | RESPIRATION RATE: 16 BRPM | BODY MASS INDEX: 28.28 KG/M2 | HEART RATE: 68 BPM | DIASTOLIC BLOOD PRESSURE: 78 MMHG | WEIGHT: 154.6 LBS | SYSTOLIC BLOOD PRESSURE: 114 MMHG

## 2018-11-03 DIAGNOSIS — N64.4 BREAST PAIN, RIGHT: Primary | ICD-10-CM

## 2018-11-03 PROCEDURE — 99213 OFFICE O/P EST LOW 20 MIN: CPT | Performed by: NURSE PRACTITIONER

## 2018-11-03 ASSESSMENT — PAIN SCALES - GENERAL: PAINLEVEL: MILD PAIN (3)

## 2018-11-03 NOTE — PROGRESS NOTES
HPI   SUBJECTIVE:   Tamica Glass is a 43 year old female who presents to clinic today for the following health issues:    Concern - RIGHT BREAST TENDERNESS   Onset: ongoing     Description:   Right breast pain for almost 1 year off and on     Intensity: mild, 2-3/10    Progression of Symptoms:  improving and intermittent    Accompanying Signs & Symptoms:  NONE    Previous history of similar problem:   NO    Precipitating factors:   Worsened by: WHEN PATIENT SLEEPS ON THE RIGHT SIDE     Alleviating factors:  Improved by: NOTHING    Therapies Tried and outcome: NOTHING    Sometimes will have pain on both sides, but typically just on the R side.  Does seem worse around her cycle, but does seem to have the pain more often that not.  Friend recently diagnosed with breast cancer, so pain is worrisome to her.  She is not a smoker.  Periods are regular.       Problem list and histories reviewed & adjusted, as indicated.  Additional history: as documented    No current outpatient prescriptions on file.     No Known Allergies    Reviewed and updated as needed this visit by clinical staff  Tobacco  Allergies  Meds  Problems  Med Hx  Surg Hx  Fam Hx  Soc Hx        Reviewed and updated as needed this visit by Provider         ROS:  Constitutional, HEENT, cardiovascular, pulmonary, gi and gu systems are negative, except as otherwise noted.    OBJECTIVE:     /78 (BP Location: Right arm, Patient Position: Sitting, Cuff Size: Adult Regular)  Pulse 68  Temp 97.9  F (36.6  C) (Oral)  Resp 16  Wt 154 lb 9.6 oz (70.1 kg)  LMP 10/15/2018  BMI 28.28 kg/m2  Body mass index is 28.28 kg/(m^2).  GENERAL: healthy, alert and no distress  NECK: no adenopathy, no asymmetry, masses, or scars and thyroid normal to palpation  RESP: lungs clear to auscultation - no rales, rhonchi or wheezes  BREAST: normal without masses or nipple discharge and no palpable axillary masses or adenopathy, R breast: tender along the 7:00 position 3  inches from the nipple  CV: regular rate and rhythm, normal S1 S2, no S3 or S4, no murmur, click or rub    Diagnostic Test Results:  none     ASSESSMENT/PLAN:   1. Breast pain, right  Normal mammo 4 months ago.  Will get a diagnostic mammo.  Discussed supportive undergarments, tylenol/ibuprofen as needed.    - MA Diagnostic Digital Bilateral; Future  - US Breast Right Complete 4 Quadrants; Future        ARIELLE Tello [facility]      Physical Exam

## 2018-11-03 NOTE — NURSING NOTE
"Chief Complaint   Patient presents with     Breast Pain     Breast tenderness.       Initial /78 (BP Location: Right arm, Patient Position: Sitting, Cuff Size: Adult Regular)  Pulse 68  Temp 97.9  F (36.6  C) (Oral)  Resp 16  Wt 154 lb 9.6 oz (70.1 kg)  LMP 10/15/2018  BMI 28.28 kg/m2 Estimated body mass index is 28.28 kg/(m^2) as calculated from the following:    Height as of 5/5/18: 5' 2\" (1.575 m).    Weight as of this encounter: 154 lb 9.6 oz (70.1 kg).  BP completed using cuff size regular RIGHT arm    Lisa Magill, CMA    "

## 2018-11-03 NOTE — MR AVS SNAPSHOT
"              After Visit Summary   11/3/2018    Tamica Glass    MRN: [id number]           Patient Information     Date Of Birth          1975        Visit Information        Provider Department      11/3/2018 7:45 AM Gela Dudley APRN CNP; [employer] [facility]        Today's Diagnoses     Breast pain, right    -  1       Follow-ups after your visit        Your next 10 appointments already scheduled     Jan 03, 2019  7:00 AM CST   Return Visit with ARIELLE Flores CNP   [facility] ([facility]    [address]  [facility] [id number] [phone]              Future tests that were ordered for you today     Open Future Orders        Priority Expected Expires Ordered    MA Diagnostic Digital Bilateral Routine  11/3/2019 11/3/2018    US Breast Right Complete 4 Quadrants Routine  11/3/2019 11/3/2018            Who to contact     If you have questions or need follow up information about today's clinic visit or your schedule please contact [facility] directly at [phone].  Normal or non-critical lab and imaging results will be communicated to you by [employer], letter or phone within 4 business days after the clinic has received the results. If you do not hear from us within 7 days, please contact the clinic through [employer] or phone. If you have a critical or abnormal lab result, we will notify you by phone as soon as possible.  Submit refill requests through [employer] or call your pharmacy and they will forward the refill request to us. Please allow 3 business days for your refill to be completed.          Additional Information About Your Visit        MyChart Information     [employer] lets you send messages to your doctor, view your test results, renew your prescriptions, schedule appointments and more. To sign up, go to [email or web address] . Click on \"Log in\" on the left side of the " "screen, which will take you to the Welcome page. Then click on \"Sign up Now\" on the right side of the page.     You will be asked to enter the access code listed below, as well as some personal information. Please follow the directions to create your username and password.     Your access code is: [email or web address]  Expires: 2018 11:37 AM     Your access code will  in 90 days. If you need help or a new code, please call your [place] clinic or [phone].        Care EveryWhere ID     This is your Care EveryWhere ID. This could be used by other organizations to access your [place] medical records  [id number]        Your Vitals Were     Pulse Temperature Respirations Last Period BMI (Body Mass Index)       68 97.9  F (36.6  C) (Oral) 16 10/15/2018 28.28 kg/m2        Blood Pressure from Last 3 Encounters:   18 114/78   18 117/77   18 106/66    Weight from Last 3 Encounters:   18 154 lb 9.6 oz (70.1 kg)   18 155 lb 11.2 oz (70.6 kg)   18 156 lb 9.6 oz (71 kg)               Primary Care Provider Office Phone # Fax #    Barbara Carrillo  [phone]  [phone]        Equal Access to Services     [facility] AH: Hadii aad ku hadasho Soomaali, waaxda luqadaha, qaybta kaalmada adeegyada, nathalia bartlett . So [facility] [phone].    ATENCIÓN: Si habla español, tiene a allison disposición servicios gratuitos de asistencia lingüística. Llame al [phone].    We comply with applicable federal civil rights laws and [place] laws. We do not discriminate on the basis of race, color, national origin, age, disability, sex, sexual orientation, or gender identity.            Thank you!     Thank you for choosing [facility]  for your care. Our goal is always to provide you with excellent care. Hearing back from our patients is one way we can continue to improve our services. Please take a few " minutes to complete the written survey that you may receive in the mail after your visit with us. Thank you!             Your Updated Medication List - Protect others around you: Learn how to safely use, store and throw away your medicines at www.disposemymeds.org.      Notice  As of 11/3/2018  8:32 AM    You have not been prescribed any medications.

## 2018-11-09 ENCOUNTER — HOSPITAL ENCOUNTER (OUTPATIENT)
Dept: MAMMOGRAPHY | Facility: CLINIC | Age: 43
Discharge: HOME | End: 2018-11-09
Attending: NURSE PRACTITIONER | Admitting: NURSE PRACTITIONER
Payer: COMMERCIAL

## 2018-11-09 ENCOUNTER — HOSPITAL ENCOUNTER (OUTPATIENT)
Dept: ULTRASOUND IMAGING | Facility: CLINIC | Age: 43
End: 2018-11-09
Attending: NURSE PRACTITIONER
Payer: COMMERCIAL

## 2018-11-09 DIAGNOSIS — N64.4 BREAST PAIN, RIGHT: ICD-10-CM

## 2018-11-09 PROCEDURE — 77065 DX MAMMO INCL CAD UNI: CPT

## 2018-11-09 PROCEDURE — 76642 ULTRASOUND BREAST LIMITED: CPT | Mod: RT

## 2018-12-22 ENCOUNTER — OFFICE VISIT (OUTPATIENT)
Dept: FAMILY MEDICINE | Facility: CLINIC | Age: 43
End: 2018-12-22
Payer: COMMERCIAL

## 2018-12-22 VITALS
HEART RATE: 69 BPM | BODY MASS INDEX: 27.98 KG/M2 | SYSTOLIC BLOOD PRESSURE: 107 MMHG | DIASTOLIC BLOOD PRESSURE: 71 MMHG | WEIGHT: 153 LBS | TEMPERATURE: 97.8 F | RESPIRATION RATE: 18 BRPM

## 2018-12-22 DIAGNOSIS — E05.00 GRAVES DISEASE: Primary | ICD-10-CM

## 2018-12-22 DIAGNOSIS — L60.9 FINGERNAIL ABNORMALITIES: ICD-10-CM

## 2018-12-22 DIAGNOSIS — R53.83 OTHER FATIGUE: ICD-10-CM

## 2018-12-22 LAB
BASOPHILS # BLD AUTO: 0 10E9/L (ref 0–0.2)
BASOPHILS NFR BLD AUTO: 0.4 %
DIFFERENTIAL METHOD BLD: NORMAL
EOSINOPHIL # BLD AUTO: 0.1 10E9/L (ref 0–0.7)
EOSINOPHIL NFR BLD AUTO: 2.4 %
ERYTHROCYTE [DISTWIDTH] IN BLOOD BY AUTOMATED COUNT: 12.2 % (ref 10–15)
HCT VFR BLD AUTO: 38.3 % (ref 35–47)
HGB BLD-MCNC: 12.3 G/DL (ref 11.7–15.7)
LYMPHOCYTES # BLD AUTO: 1.9 10E9/L (ref 0.8–5.3)
LYMPHOCYTES NFR BLD AUTO: 41.5 %
MCH RBC QN AUTO: 30 PG (ref 26.5–33)
MCHC RBC AUTO-ENTMCNC: 32.1 G/DL (ref 31.5–36.5)
MCV RBC AUTO: 93 FL (ref 78–100)
MONOCYTES # BLD AUTO: 0.4 10E9/L (ref 0–1.3)
MONOCYTES NFR BLD AUTO: 7.7 %
NEUTROPHILS # BLD AUTO: 2.2 10E9/L (ref 1.6–8.3)
NEUTROPHILS NFR BLD AUTO: 48 %
PLATELET # BLD AUTO: 287 10E9/L (ref 150–450)
RBC # BLD AUTO: 4.1 10E12/L (ref 3.8–5.2)
TSH SERPL DL<=0.005 MIU/L-ACNC: 1.61 MU/L (ref 0.4–4)
WBC # BLD AUTO: 4.7 10E9/L (ref 4–11)

## 2018-12-22 PROCEDURE — 36415 COLL VENOUS BLD VENIPUNCTURE: CPT | Performed by: PHYSICIAN ASSISTANT

## 2018-12-22 PROCEDURE — 85025 COMPLETE CBC W/AUTO DIFF WBC: CPT | Performed by: PHYSICIAN ASSISTANT

## 2018-12-22 PROCEDURE — 84443 ASSAY THYROID STIM HORMONE: CPT | Performed by: PHYSICIAN ASSISTANT

## 2018-12-22 PROCEDURE — 99213 OFFICE O/P EST LOW 20 MIN: CPT | Performed by: PHYSICIAN ASSISTANT

## 2018-12-22 NOTE — LETTER
[facility]  December 28, 2018      [place] [address]           Phone :  [phone]          Fax:  [phone]  Tamica Glass  [address] [address]        Dear Tamica -    Here are your results from your recent clinic visit.    Thyroid and complete blood count are normal.    If you have questions please call the clinic at [phone].      Take care,    Arnold Caruso PA-C/paulas

## 2018-12-22 NOTE — PROGRESS NOTES
SUBJECTIVE:   Tamica Glass is a 43 year old female who presents to clinic today for the following health issues:    -indentation on fingernails    -concerns for thryoid - requsting labs    Concern - weakness  Onset: x 1 weeks    Description:          -generalized fatigue/weakness all over    Intensity: mild to moderate    Progression of Symptoms:  same    Accompanying Signs & Symptoms:  none    Previous history of similar problem:    none      Therapies Tried and outcome: none    Feeling tired, weak.  See's Glory Sorto for Graves disease- has appointment with her in January but would like thyroid tested now.  Is not currently taking any medication for this.  Concerned about lines in a few of her finger nails.      Problem list and histories reviewed & adjusted, as indicated.  Additional history: as documented    Labs reviewed in EPIC    Reviewed and updated as needed this visit by clinical staff  Tobacco  Allergies  Meds  Med Hx  Surg Hx  Fam Hx  Soc Hx      Reviewed and updated as needed this visit by Provider         ROS:  Constitutional, HEENT, cardiovascular, pulmonary, gi and gu systems are negative, except as otherwise noted.    OBJECTIVE:     /71 (BP Location: Right arm, Patient Position: Chair, Cuff Size: Adult Regular)   Pulse 69   Temp 97.8  F (36.6  C) (Oral)   Resp 18   Wt 69.4 kg (153 lb)   BMI 27.98 kg/m    Body mass index is 27.98 kg/m .  GENERAL: healthy, alert and no distress  NECK: no adenopathy, no asymmetry, masses, or scars and thyroid normal to palpation  MS: no gross musculoskeletal defects noted, no edema  SKIN: 2-3 nails on each hand with transverse elevated ridges   PSYCH: mentation appears normal, affect normal/bright    Diagnostic Test Results:  pending    ASSESSMENT/PLAN:   1. Graves disease  Will check labs.  Follow up with Glory Sorto, sooner if needed pending labs.  - TSH with free T4 reflex    2. Other fatigue  - as above.  - TSH with free T4 reflex  - CBC with  platelets differential    3. Fingernail abnormalities  - likely benign.  Discussed with patient.          Arnold Caruso PA-C  [facility]

## 2019-05-06 ENCOUNTER — OFFICE VISIT (OUTPATIENT)
Dept: FAMILY MEDICINE | Facility: CLINIC | Age: 44
End: 2019-05-06
Payer: COMMERCIAL

## 2019-05-06 VITALS
DIASTOLIC BLOOD PRESSURE: 69 MMHG | TEMPERATURE: 97.2 F | BODY MASS INDEX: 30.23 KG/M2 | SYSTOLIC BLOOD PRESSURE: 105 MMHG | WEIGHT: 154 LBS | RESPIRATION RATE: 16 BRPM | HEIGHT: 60 IN | HEART RATE: 63 BPM

## 2019-05-06 DIAGNOSIS — Z00.00 ROUTINE GENERAL MEDICAL EXAMINATION AT A HEALTH CARE FACILITY: Primary | ICD-10-CM

## 2019-05-06 LAB
ANION GAP SERPL CALCULATED.3IONS-SCNC: 7 MMOL/L (ref 3–14)
BUN SERPL-MCNC: 22 MG/DL (ref 7–30)
CALCIUM SERPL-MCNC: 9 MG/DL (ref 8.5–10.1)
CHLORIDE SERPL-SCNC: 111 MMOL/L (ref 94–109)
CHOLEST SERPL-MCNC: 178 MG/DL
CO2 SERPL-SCNC: 22 MMOL/L (ref 20–32)
CREAT SERPL-MCNC: 0.57 MG/DL (ref 0.52–1.04)
ERYTHROCYTE [DISTWIDTH] IN BLOOD BY AUTOMATED COUNT: 12.5 % (ref 10–15)
GFR SERPL CREATININE-BSD FRML MDRD: >90 ML/MIN/{1.73_M2}
GLUCOSE SERPL-MCNC: 102 MG/DL (ref 70–99)
HCT VFR BLD AUTO: 39.5 % (ref 35–47)
HDLC SERPL-MCNC: 58 MG/DL
HGB BLD-MCNC: 13 G/DL (ref 11.7–15.7)
LDLC SERPL CALC-MCNC: 103 MG/DL
MCH RBC QN AUTO: 30.4 PG (ref 26.5–33)
MCHC RBC AUTO-ENTMCNC: 32.9 G/DL (ref 31.5–36.5)
MCV RBC AUTO: 93 FL (ref 78–100)
NONHDLC SERPL-MCNC: 120 MG/DL
PLATELET # BLD AUTO: 268 10E9/L (ref 150–450)
POTASSIUM SERPL-SCNC: 3.6 MMOL/L (ref 3.4–5.3)
RBC # BLD AUTO: 4.27 10E12/L (ref 3.8–5.2)
SODIUM SERPL-SCNC: 140 MMOL/L (ref 133–144)
TRIGL SERPL-MCNC: 84 MG/DL
TSH SERPL DL<=0.005 MIU/L-ACNC: 1.32 MU/L (ref 0.4–4)
WBC # BLD AUTO: 4.5 10E9/L (ref 4–11)

## 2019-05-06 PROCEDURE — 36415 COLL VENOUS BLD VENIPUNCTURE: CPT | Performed by: FAMILY MEDICINE

## 2019-05-06 PROCEDURE — 99396 PREV VISIT EST AGE 40-64: CPT | Performed by: FAMILY MEDICINE

## 2019-05-06 PROCEDURE — 84443 ASSAY THYROID STIM HORMONE: CPT | Performed by: FAMILY MEDICINE

## 2019-05-06 PROCEDURE — 85027 COMPLETE CBC AUTOMATED: CPT | Performed by: FAMILY MEDICINE

## 2019-05-06 PROCEDURE — 80048 BASIC METABOLIC PNL TOTAL CA: CPT | Performed by: FAMILY MEDICINE

## 2019-05-06 PROCEDURE — 80061 LIPID PANEL: CPT | Performed by: FAMILY MEDICINE

## 2019-05-06 ASSESSMENT — ENCOUNTER SYMPTOMS
CHILLS: 0
EYE PAIN: 0
ARTHRALGIAS: 0
WEAKNESS: 0
SHORTNESS OF BREATH: 0
NERVOUS/ANXIOUS: 0
PARESTHESIAS: 0
HEMATURIA: 0
CONSTIPATION: 0
PALPITATIONS: 0
DIZZINESS: 0
COUGH: 0
NAUSEA: 0
HEADACHES: 0
HEMATOCHEZIA: 0
JOINT SWELLING: 0
FREQUENCY: 0
DIARRHEA: 0
SORE THROAT: 0
DYSURIA: 0
ABDOMINAL PAIN: 0
BREAST MASS: 0
MYALGIAS: 0
HEARTBURN: 0
FEVER: 0

## 2019-05-06 ASSESSMENT — MIFFLIN-ST. JEOR: SCORE: 1260.04

## 2019-05-06 NOTE — RESULT ENCOUNTER NOTE
Please find attached the lab results from your recent physical.   All labs are ok and do not require further investigation.   For further questions or concerns please let us know.     Best Wishes,    Dr. Palencia

## 2019-06-26 ENCOUNTER — HOSPITAL ENCOUNTER (OUTPATIENT)
Dept: MAMMOGRAPHY | Facility: CLINIC | Age: 44
Discharge: HOME | End: 2019-06-26
Attending: FAMILY MEDICINE | Admitting: FAMILY MEDICINE
Payer: COMMERCIAL

## 2019-06-26 DIAGNOSIS — Z00.00 ROUTINE GENERAL MEDICAL EXAMINATION AT A HEALTH CARE FACILITY: ICD-10-CM

## 2019-06-26 PROCEDURE — 77063 BREAST TOMOSYNTHESIS BI: CPT

## 2019-10-07 NOTE — PROGRESS NOTES
Pre-Visit Planning     Future Appointments   Date Time Provider Department Center   10/8/2019  8:20 AM Chilo Go PA-C [facility]     Appointment Notes for this encounter:   Thyroid Issue would like to do labs if possible**Requesting : Akira from Amanda Corona**   (10/7/19 confirmed appointment and reason for visit weight gain, weakness, chest pressure for one month-jf)    Patient denies chest pain, difficulty breathing. She is taking in strong voice in primary language clearly and without audible breaks to cough or catch her breath.     Patient preferred phone number: [phone]     Patient contacted.   Spoke to patient via phone via . {Health Maintenance  Health Maintenance Due   Topic Date Due     ANNUAL REVIEW OF HM ORDERS  1975     HIV SCREENING  03/10/1988     INFLUENZA VACCINE (1) 09/01/2019     Oliver Cool, RN

## 2019-10-08 ENCOUNTER — OFFICE VISIT (OUTPATIENT)
Dept: FAMILY MEDICINE | Facility: CLINIC | Age: 44
End: 2019-10-08
Payer: COMMERCIAL

## 2019-10-08 VITALS
SYSTOLIC BLOOD PRESSURE: 108 MMHG | OXYGEN SATURATION: 98 % | WEIGHT: 159.1 LBS | HEART RATE: 67 BPM | BODY MASS INDEX: 31.07 KG/M2 | TEMPERATURE: 98.2 F | DIASTOLIC BLOOD PRESSURE: 62 MMHG

## 2019-10-08 DIAGNOSIS — R07.89 CHEST HEAVINESS: Primary | ICD-10-CM

## 2019-10-08 DIAGNOSIS — E05.00 GRAVES DISEASE: ICD-10-CM

## 2019-10-08 DIAGNOSIS — R53.83 OTHER FATIGUE: ICD-10-CM

## 2019-10-08 LAB
DEPRECATED CALCIDIOL+CALCIFEROL SERPL-MC: 27 UG/L (ref 20–75)
T3FREE SERPL-MCNC: 2.4 PG/ML (ref 2.3–4.2)

## 2019-10-08 PROCEDURE — 99214 OFFICE O/P EST MOD 30 MIN: CPT | Performed by: PHYSICIAN ASSISTANT

## 2019-10-08 PROCEDURE — 36415 COLL VENOUS BLD VENIPUNCTURE: CPT | Performed by: PHYSICIAN ASSISTANT

## 2019-10-08 PROCEDURE — 84443 ASSAY THYROID STIM HORMONE: CPT | Performed by: PHYSICIAN ASSISTANT

## 2019-10-08 PROCEDURE — 84439 ASSAY OF FREE THYROXINE: CPT | Performed by: PHYSICIAN ASSISTANT

## 2019-10-08 PROCEDURE — 93000 ELECTROCARDIOGRAM COMPLETE: CPT | Performed by: PHYSICIAN ASSISTANT

## 2019-10-08 PROCEDURE — 82306 VITAMIN D 25 HYDROXY: CPT | Performed by: PHYSICIAN ASSISTANT

## 2019-10-08 PROCEDURE — 84481 FREE ASSAY (FT-3): CPT | Performed by: PHYSICIAN ASSISTANT

## 2019-10-08 NOTE — PROGRESS NOTES
Subjective     Tamica Glass is a 44 year old female who presents to clinic today for the following health issues:    History of Present Illness        Hypothyroidism:     Since last visit, patient describes the following symptoms::  Fatigue    She eats 0-1 servings of fruits and vegetables daily.She consumes 0 sweetened beverage(s) daily.  She is taking medications regularly.     History of graves. Stable for over 1 year without medication. Has not seen pawel in 1.5 years. Patient complains of fatigue, troubles sleeping, and chest heaviness. Is worried her thyroid may be off.     CHEST PAIN     Onset: 1 month     Description:   Location:  entire chest  Character: heavy  Radiation: none   Duration: constant     Intensity: mild    Progression of Symptoms:  same    Accompanying Signs & Symptoms:  Shortness of breath: no  Sweating: no  Nausea/vomiting: no  Lightheadedness: no  Palpitations: no  Fever/Chills: no  Cough: no  Heartburn: no    History:   Family history of heart disease YES  Tobacco use: no    Precipitating factors:   Worse with exertion: no  Worse with deep breaths :  no  Related to food: no    Alleviating factors:  None        Therapies Tried and outcome: none       Patient Active Problem List   Diagnosis     Hyperthyroidism     Graves disease     Thyroid nodule     Family history of gastric cancer     Helicobacter positive gastritis     History reviewed. No pertinent surgical history.    Social History     Tobacco Use     Smoking status: Never Smoker     Smokeless tobacco: Never Used   Substance Use Topics     Alcohol use: No     Family History   Problem Relation Age of Onset     Coronary Artery Disease Mother         onset after age 50.      Hypertension Mother          No current outpatient medications on file.     No Known Allergies  BP Readings from Last 3 Encounters:   10/08/19 108/62   05/06/19 105/69   12/22/18 107/71    Wt Readings from Last 3 Encounters:   10/08/19 72.2 kg (159 lb 1.6 oz)    05/06/19 69.9 kg (154 lb)   12/22/18 69.4 kg (153 lb)                    Reviewed and updated as needed this visit by Provider         Review of Systems   ROS COMP: Constitutional, endocrine, HEENT, cardiovascular, pulmonary, gi and gu systems are negative, except as otherwise noted.      Objective    /62 (BP Location: Right arm, Patient Position: Chair, Cuff Size: Adult Large)   Pulse 67   Temp 98.2  F (36.8  C) (Oral)   Wt 72.2 kg (159 lb 1.6 oz)   LMP 09/10/2019 (Exact Date)   SpO2 98%   Breastfeeding? No   BMI 31.07 kg/m    Body mass index is 31.07 kg/m .  Physical Exam   GENERAL: healthy, alert and no distress  NECK: no adenopathy, no asymmetry, masses, or scars and thyroid normal to palpation  RESP: lungs clear to auscultation - no rales, rhonchi or wheezes  CV: regular rates and rhythm, normal S1 S2, no S3 or S4 and no murmur, click or rub  PSYCH: mentation appears normal, affect normal/bright    Diagnostic Test Results:  EKG - appears normal, NSR, negative precordial T wave in v1. Otherwise, normal axis, normal intervals, no acute ST/T changes c/w ischemia, no LVH by voltage criteria, there are no prior tracings available        Assessment & Plan     (R07.89) Chest heaviness  (primary encounter diagnosis)  Comment: history slightly concerning for coronary etiology. However, age, lack of family history under age 50, description of symptoms are all reassuring against this. EKG reassuring today. No previous EKG to compare but no obvious ischemic changes or evidence of previous chronic disease. Graves may be playing a role. Will recheck thyroid today as well as vitamin d. Regardless, will likely have follow up with endocrine despite thyroid result. However, if work up normal, will still consider stress echo given negative t wave without comparable study in the past and description.    Plan: EKG 12-lead complete w/read - Clinics            (E05.00) Graves disease  Comment: as above   Plan: TSH, T4  free, T3 Free            (R53.83) Other fatigue  Comment: as above   Plan: Vitamin D Deficiency               BMI:   Estimated body mass index is 31.07 kg/m  as calculated from the following:    Height as of 5/6/19: 1.524 m (5').    Weight as of this encounter: 72.2 kg (159 lb 1.6 oz).   Weight management plan: Discussed healthy diet and exercise guidelines        Return in about 3 months (around 1/8/2020) for specialty, thyroid follow up.    Chilo Go PA-C  [facility]

## 2019-10-08 NOTE — PATIENT INSTRUCTIONS
Patient Education     Uncertain Causes of Chest Pain    Chest pain can happen for a number of reasons. Sometimes the cause can't be determined. If your condition does not seem serious, and your pain does not appear to be coming from your heart, your healthcare provider may recommend watching it closely. Sometimes the signs of a serious problem take more time to appear. Many problems not related to your heart can cause chest pain. These include:    Musculoskeletal. Costochondritis is an inflammation of the tissues around the ribs that can occur from trauma or overuse injuries, or a strain of the muscles of the chest wall    Respiratory. Pneumonia, collapsed lung (pneumothorax), or inflammation of the lining of the chest and lungs (pleurisy)    Gastrointestinal. Esophageal reflux, heartburn, ulcers, or gallbladder disease    Anxiety and panic disorders    Nerve compression and inflammation    Rare miscellaneous problems such as aortic aneurysm (a swelling of the large artery coming out of the heart) or pulmonary embolism (a blood clot in the lungs)  Home care  After your visit, follow these recommendations:    Rest today and avoid strenuous activity.    Take any prescribed medicine as directed.    Be aware of any recurrent chest pain and notice any changes  Follow-up care  Follow up with your healthcare provider if you do not start to feel better within 24 hours, or as advised.  Call 911  Call 911 if any of these occur:    A change in the type of pain: if it feels different, becomes more severe, lasts longer, or begins to spread into your shoulder, arm, neck, jaw or back    Shortness of breath or increased pain with breathing    Weakness, dizziness, or fainting    Rapid heart beat    Crushing sensation in your chest  When to seek medical advice  Call your healthcare provider right away if any of the following occur:    Cough with dark colored sputum (phlegm) or blood    Fever of 100.4 F (38 C) or higher, or as  directed by your healthcare provider    Swelling, pain or redness in one leg  Date Last Reviewed: 5/1/2018 2000-2018 [employer]. [address] [place] [address], [place] [address]. All rights reserved. This information is not intended as a substitute for professional medical care. Always follow your healthcare professional's instructions.

## 2019-10-09 LAB
T4 FREE SERPL-MCNC: 1.01 NG/DL (ref 0.76–1.46)
TSH SERPL DL<=0.005 MIU/L-ACNC: 2.78 MU/L (ref 0.4–4)

## 2021-08-10 ENCOUNTER — OFFICE VISIT (OUTPATIENT)
Dept: OPTOMETRY | Facility: CLINIC | Age: 46
End: 2021-08-10
Payer: COMMERCIAL

## 2021-08-10 DIAGNOSIS — H52.203 MYOPIA OF BOTH EYES WITH ASTIGMATISM: Primary | ICD-10-CM

## 2021-08-10 DIAGNOSIS — H52.13 MYOPIA OF BOTH EYES WITH ASTIGMATISM: Primary | ICD-10-CM

## 2021-08-10 DIAGNOSIS — H52.4 PRESBYOPIA: ICD-10-CM

## 2021-08-10 PROCEDURE — 92015 DETERMINE REFRACTIVE STATE: CPT | Performed by: OPTOMETRIST

## 2021-08-10 PROCEDURE — 92004 COMPRE OPH EXAM NEW PT 1/>: CPT | Performed by: OPTOMETRIST

## 2021-08-10 ASSESSMENT — REFRACTION_MANIFEST
OS_SPHERE: -2.25
OD_CYLINDER: +1.50
OD_SPHERE: -2.50
OD_AXIS: 089
METHOD_AUTOREFRACTION: 1
OS_CYLINDER: +1.25
OS_CYLINDER: +1.50
OD_AXIS: 094
OD_ADD: +1.50
OS_AXIS: 070
OD_SPHERE: -3.75
OD_CYLINDER: +2.25
OS_ADD: +1.50
OS_AXIS: 071
OS_SPHERE: -2.75

## 2021-08-10 ASSESSMENT — VISUAL ACUITY
OS_CC: 20/30-1
OD_CC: 20/30-1
OS_SC: 20/60
METHOD: SNELLEN - LINEAR
OS_CC: 20/30
CORRECTION_TYPE: GLASSES
OD_SC: 20/80
OD_CC: 20/25
OS_CC+: -1

## 2021-08-10 ASSESSMENT — CUP TO DISC RATIO
OS_RATIO: 0.15
OD_RATIO: 0.1

## 2021-08-10 ASSESSMENT — REFRACTION_WEARINGRX
OS_AXIS: 078
OS_CYLINDER: +0.25
OD_AXIS: 093
OS_SPHERE: -1.00
SPECS_TYPE: SVL
OD_CYLINDER: +1.00
OD_SPHERE: -1.75

## 2021-08-10 ASSESSMENT — CONF VISUAL FIELD
OD_NORMAL: 1
OS_NORMAL: 1

## 2021-08-10 ASSESSMENT — TONOMETRY
OD_IOP_MMHG: 17
IOP_METHOD: APPLANATION
OS_IOP_MMHG: 17

## 2021-08-10 ASSESSMENT — EXTERNAL EXAM - RIGHT EYE: OD_EXAM: NORMAL

## 2021-08-10 ASSESSMENT — EXTERNAL EXAM - LEFT EYE: OS_EXAM: NORMAL

## 2021-08-10 ASSESSMENT — SLIT LAMP EXAM - LIDS
COMMENTS: MEIBOMIAN GLAND DYSFUNCTION
COMMENTS: MEIBOMIAN GLAND DYSFUNCTION

## 2021-08-10 NOTE — PROGRESS NOTES
Chief Complaint   Patient presents with     Annual Eye Exam      Accompanied by son  Last Eye Exam: 2018  Dilated Previously: No, side effects of dilation explained today    What are you currently using to see?  glasses       Distance Vision Acuity: Satisfied with vision    Near Vision Acuity: Not satisfied     Eye Comfort: good  Do you use eye drops? : No  Occupation or Hobbies: [employer]     Valentina Swann Optometric Assistant, A.B.O.C.          Medical, surgical and family histories reviewed and updated 8/10/2021.       OBJECTIVE: See Ophthalmology exam    ASSESSMENT:    ICD-10-CM    1. Myopia of both eyes with astigmatism  H52.13 EYE EXAM (SIMPLE-NONBILLABLE)    H52.203 REFRACTION   2. Presbyopia  H52.4 EYE EXAM (SIMPLE-NONBILLABLE)     REFRACTION      PLAN:   Discussed spec options / bifocal       Ame Wilcox OD

## 2021-08-10 NOTE — LETTER
8/10/2021         RE: Tamica Glass  [address] [facility] [address]        Dear Colleague,    [facility]. Please see a copy of my visit note below.    Chief Complaint   Patient presents with     Annual Eye Exam      Accompanied by son  Last Eye Exam: 2018  Dilated Previously: No, side effects of dilation explained today    What are you currently using to see?  glasses       Distance Vision Acuity: Satisfied with vision    Near Vision Acuity: Not satisfied     Eye Comfort: good  Do you use eye drops? : No  Occupation or Hobbies: [employer]     Valentina Swann Optometric Assistant, A.B.O.C.          Medical, surgical and family histories reviewed and updated 8/10/2021.       OBJECTIVE: See Ophthalmology exam    ASSESSMENT:    ICD-10-CM    1. Myopia of both eyes with astigmatism  H52.13 EYE EXAM (SIMPLE-NONBILLABLE)    H52.203 REFRACTION   2. Presbyopia  H52.4 EYE EXAM (SIMPLE-NONBILLABLE)     REFRACTION      PLAN:   Discussed spec options / bifocal       Ame Wilcox OD       Again, thank you for allowing me to participate in the care of your patient.        Sincerely,        Ame Wilcox, OD

## 2021-09-16 ENCOUNTER — TELEPHONE (OUTPATIENT)
Dept: PEDIATRICS | Facility: CLINIC | Age: 46
End: 2021-09-16

## 2021-09-16 NOTE — TELEPHONE ENCOUNTER
Dr. Palencia,    Received call from pt's daughter regarding a mammogram  Pt is having left sided breast pain  Daughter did not have any other information    Should pt have a diagnostic mammogram?- pended- please review    Thank you  Skinny Sherwood RN on 9/16/2021 at 3:24 PM

## 2021-09-16 NOTE — TELEPHONE ENCOUNTER
Patient last seen by us in 2019. Per chart review she has had breast pain before.   She should be seen and then a determination made as to what needs to be done.     SHMUEL

## 2023-02-27 ENCOUNTER — OFFICE VISIT (OUTPATIENT)
Dept: FAMILY MEDICINE | Facility: CLINIC | Age: 48
End: 2023-02-27
Payer: COMMERCIAL

## 2023-02-27 VITALS
SYSTOLIC BLOOD PRESSURE: 123 MMHG | WEIGHT: 162.9 LBS | TEMPERATURE: 98.4 F | HEIGHT: 61 IN | DIASTOLIC BLOOD PRESSURE: 79 MMHG | RESPIRATION RATE: 18 BRPM | OXYGEN SATURATION: 98 % | HEART RATE: 80 BPM | BODY MASS INDEX: 30.76 KG/M2

## 2023-02-27 DIAGNOSIS — Z12.31 ENCOUNTER FOR SCREENING MAMMOGRAM FOR BREAST CANCER: ICD-10-CM

## 2023-02-27 DIAGNOSIS — Z13.31 POSITIVE DEPRESSION SCREENING: ICD-10-CM

## 2023-02-27 DIAGNOSIS — Z00.00 ANNUAL PHYSICAL EXAM: Primary | ICD-10-CM

## 2023-02-27 DIAGNOSIS — R10.13 DYSPEPSIA: ICD-10-CM

## 2023-02-27 DIAGNOSIS — Z12.11 COLON CANCER SCREENING: ICD-10-CM

## 2023-02-27 LAB
ALBUMIN SERPL BCG-MCNC: 4.5 G/DL (ref 3.5–5.2)
ALP SERPL-CCNC: 59 U/L (ref 35–104)
ALT SERPL W P-5'-P-CCNC: 15 U/L (ref 10–35)
ANION GAP SERPL CALCULATED.3IONS-SCNC: 12 MMOL/L (ref 7–15)
AST SERPL W P-5'-P-CCNC: 22 U/L (ref 10–35)
BILIRUB SERPL-MCNC: 0.2 MG/DL
BUN SERPL-MCNC: 14 MG/DL (ref 6–20)
CALCIUM SERPL-MCNC: 9.7 MG/DL (ref 8.6–10)
CHLORIDE SERPL-SCNC: 103 MMOL/L (ref 98–107)
CHOLEST SERPL-MCNC: 234 MG/DL
CREAT SERPL-MCNC: 0.56 MG/DL (ref 0.51–0.95)
DEPRECATED HCO3 PLAS-SCNC: 22 MMOL/L (ref 22–29)
ERYTHROCYTE [DISTWIDTH] IN BLOOD BY AUTOMATED COUNT: 12.1 % (ref 10–15)
GFR SERPL CREATININE-BSD FRML MDRD: >90 ML/MIN/1.73M2
GLUCOSE SERPL-MCNC: 139 MG/DL (ref 70–99)
HBA1C MFR BLD: 5.9 % (ref 0–5.6)
HCT VFR BLD AUTO: 40.9 % (ref 35–47)
HDLC SERPL-MCNC: 70 MG/DL
HGB BLD-MCNC: 13.6 G/DL (ref 11.7–15.7)
LDLC SERPL CALC-MCNC: 136 MG/DL
MCH RBC QN AUTO: 31.1 PG (ref 26.5–33)
MCHC RBC AUTO-ENTMCNC: 33.3 G/DL (ref 31.5–36.5)
MCV RBC AUTO: 94 FL (ref 78–100)
NONHDLC SERPL-MCNC: 164 MG/DL
PLATELET # BLD AUTO: 269 10E3/UL (ref 150–450)
POTASSIUM SERPL-SCNC: 3.7 MMOL/L (ref 3.4–5.3)
PROT SERPL-MCNC: 8.1 G/DL (ref 6.4–8.3)
RBC # BLD AUTO: 4.37 10E6/UL (ref 3.8–5.2)
SODIUM SERPL-SCNC: 137 MMOL/L (ref 136–145)
TRIGL SERPL-MCNC: 140 MG/DL
TSH SERPL DL<=0.005 MIU/L-ACNC: 3 UIU/ML (ref 0.3–4.2)
WBC # BLD AUTO: 4.1 10E3/UL (ref 4–11)

## 2023-02-27 PROCEDURE — 80053 COMPREHEN METABOLIC PANEL: CPT | Performed by: FAMILY MEDICINE

## 2023-02-27 PROCEDURE — 84443 ASSAY THYROID STIM HORMONE: CPT | Performed by: FAMILY MEDICINE

## 2023-02-27 PROCEDURE — 99386 PREV VISIT NEW AGE 40-64: CPT | Performed by: FAMILY MEDICINE

## 2023-02-27 PROCEDURE — 80061 LIPID PANEL: CPT | Performed by: FAMILY MEDICINE

## 2023-02-27 PROCEDURE — 83036 HEMOGLOBIN GLYCOSYLATED A1C: CPT | Performed by: FAMILY MEDICINE

## 2023-02-27 PROCEDURE — 36415 COLL VENOUS BLD VENIPUNCTURE: CPT | Performed by: FAMILY MEDICINE

## 2023-02-27 PROCEDURE — 85027 COMPLETE CBC AUTOMATED: CPT | Performed by: FAMILY MEDICINE

## 2023-02-27 SDOH — ECONOMIC STABILITY: INCOME INSECURITY: HOW HARD IS IT FOR YOU TO PAY FOR THE VERY BASICS LIKE FOOD, HOUSING, MEDICAL CARE, AND HEATING?: NOT HARD AT ALL

## 2023-02-27 SDOH — ECONOMIC STABILITY: TRANSPORTATION INSECURITY
IN THE PAST 12 MONTHS, HAS LACK OF TRANSPORTATION KEPT YOU FROM MEETINGS, WORK, OR FROM GETTING THINGS NEEDED FOR DAILY LIVING?: NO

## 2023-02-27 SDOH — ECONOMIC STABILITY: FOOD INSECURITY: WITHIN THE PAST 12 MONTHS, THE FOOD YOU BOUGHT JUST DIDN'T LAST AND YOU DIDN'T HAVE MONEY TO GET MORE.: NEVER TRUE

## 2023-02-27 SDOH — ECONOMIC STABILITY: INCOME INSECURITY: IN THE LAST 12 MONTHS, WAS THERE A TIME WHEN YOU WERE NOT ABLE TO PAY THE MORTGAGE OR RENT ON TIME?: NO

## 2023-02-27 SDOH — HEALTH STABILITY: PHYSICAL HEALTH: ON AVERAGE, HOW MANY MINUTES DO YOU ENGAGE IN EXERCISE AT THIS LEVEL?: 0 MIN

## 2023-02-27 SDOH — HEALTH STABILITY: PHYSICAL HEALTH: ON AVERAGE, HOW MANY DAYS PER WEEK DO YOU ENGAGE IN MODERATE TO STRENUOUS EXERCISE (LIKE A BRISK WALK)?: 0 DAYS

## 2023-02-27 SDOH — ECONOMIC STABILITY: TRANSPORTATION INSECURITY
IN THE PAST 12 MONTHS, HAS THE LACK OF TRANSPORTATION KEPT YOU FROM MEDICAL APPOINTMENTS OR FROM GETTING MEDICATIONS?: NO

## 2023-02-27 SDOH — ECONOMIC STABILITY: FOOD INSECURITY: WITHIN THE PAST 12 MONTHS, YOU WORRIED THAT YOUR FOOD WOULD RUN OUT BEFORE YOU GOT MONEY TO BUY MORE.: NEVER TRUE

## 2023-02-27 ASSESSMENT — ENCOUNTER SYMPTOMS
ARTHRALGIAS: 1
JOINT SWELLING: 1
ABDOMINAL PAIN: 0
FEVER: 0
DYSURIA: 0
NERVOUS/ANXIOUS: 0
PALPITATIONS: 0
HEARTBURN: 1
DIARRHEA: 0
DIZZINESS: 0
SHORTNESS OF BREATH: 0
NAUSEA: 0
CHILLS: 0
COUGH: 0
HEADACHES: 0
CONSTIPATION: 0
HEMATURIA: 0
PARESTHESIAS: 0
HEMATOCHEZIA: 0
SORE THROAT: 0
WEAKNESS: 1
EYE PAIN: 0
FREQUENCY: 0
MYALGIAS: 0
BREAST MASS: 1

## 2023-02-27 ASSESSMENT — PAIN SCALES - GENERAL: PAINLEVEL: NO PAIN (0)

## 2023-02-27 ASSESSMENT — SOCIAL DETERMINANTS OF HEALTH (SDOH)
HOW OFTEN DO YOU GET TOGETHER WITH FRIENDS OR RELATIVES?: TWICE A WEEK
HOW OFTEN DO YOU ATTEND CHURCH OR RELIGIOUS SERVICES?: NEVER
DO YOU BELONG TO ANY CLUBS OR ORGANIZATIONS SUCH AS CHURCH GROUPS UNIONS, FRATERNAL OR ATHLETIC GROUPS, OR SCHOOL GROUPS?: NO
IN A TYPICAL WEEK, HOW MANY TIMES DO YOU TALK ON THE PHONE WITH FAMILY, FRIENDS, OR NEIGHBORS?: MORE THAN THREE TIMES A WEEK

## 2023-02-27 ASSESSMENT — LIFESTYLE VARIABLES
HOW OFTEN DO YOU HAVE SIX OR MORE DRINKS ON ONE OCCASION: NEVER
HOW OFTEN DO YOU HAVE A DRINK CONTAINING ALCOHOL: 2-4 TIMES A MONTH
HOW MANY STANDARD DRINKS CONTAINING ALCOHOL DO YOU HAVE ON A TYPICAL DAY: 1 OR 2
SKIP TO QUESTIONS 9-10: 1
AUDIT-C TOTAL SCORE: 2

## 2023-02-27 NOTE — PROGRESS NOTES
SUBJECTIVE:   CC: Tamica is an 47 year old who presents for preventive health visit.   Patient has been advised of split billing requirements and indicates understanding: Yes  Healthy Habits:     Getting at least 3 servings of Calcium per day:  NO    Bi-annual eye exam:  NO    Dental care twice a year:  NO    Sleep apnea or symptoms of sleep apnea:  None    Diet:  Regular (no restrictions)    Frequency of exercise:  None    Taking medications regularly:  Yes    Medication side effects:  None    PHQ-2 Total Score: 2    Additional concerns today:  No    Accompanied by friend and neighbor Amelie.        Today's PHQ-2 Score:   PHQ-2 ( 1999 [employer]) 2/27/2023   Q1: Little interest or pleasure in doing things 1   Q2: Feeling down, depressed or hopeless 1   PHQ-2 Score 2   PHQ-2 Total Score (12-17 Years)- Positive if 3 or more points; Administer PHQ-A if positive -   Q1: Little interest or pleasure in doing things Several days   Q2: Feeling down, depressed or hopeless Several days   PHQ-2 Score 2       Have you ever done Advance Care Planning? (For example, a Health Directive, POLST, or a discussion with a medical provider or your loved ones about your wishes): No, advance care planning information given to patient to review.  Patient plans to discuss their wishes with loved ones or provider.      Social History     Tobacco Use     Smoking status: Never     Smokeless tobacco: Never   Substance Use Topics     Alcohol use: No     If you drink alcohol do you typically have >3 drinks per day or >7 drinks per week? No    Alcohol Use 2/27/2023   Prescreen: >3 drinks/day or >7 drinks/week? No   Prescreen: >3 drinks/day or >7 drinks/week? -       Reviewed orders with patient.  Reviewed health maintenance and updated orders accordingly - Yes  Lab work is in process  Labs reviewed in EPIC    Breast Cancer Screening:    Breast CA Risk Assessment (FHS-7) 2/27/2023   Do you have a family history of breast, colon, or ovarian cancer? No /  "Unknown       click delete button to remove this line now  Mammogram Screening: Recommended annual mammography  Pertinent mammograms are reviewed under the imaging tab.    History of abnormal Pap smear: NO - age 30-65 PAP every 5 years with negative HPV co-testing recommended  PAP / HPV Latest Ref Rng & Units 4/19/2017   PAP (Historical) - NIL   HPV16 NEG Negative   HPV18 NEG Negative   HRHPV NEG Negative     Reviewed and updated as needed this visit by clinical staff     Meds              Reviewed and updated as needed this visit by Provider     Meds                 Review of Systems   Constitutional: Negative for chills and fever.   HENT: Negative for congestion, ear pain, hearing loss and sore throat.    Eyes: Positive for visual disturbance. Negative for pain.   Respiratory: Negative for cough and shortness of breath.    Cardiovascular: Negative for chest pain, palpitations and peripheral edema.   Gastrointestinal: Positive for heartburn. Negative for abdominal pain, constipation, diarrhea, hematochezia and nausea.   Breasts:  Positive for tenderness and breast mass. Negative for discharge.   Genitourinary: Negative for dysuria, frequency, genital sores, hematuria, pelvic pain, urgency, vaginal bleeding and vaginal discharge.   Musculoskeletal: Positive for arthralgias and joint swelling. Negative for myalgias.   Skin: Positive for rash.   Neurological: Positive for weakness. Negative for dizziness, headaches and paresthesias.   Psychiatric/Behavioral: Negative for mood changes. The patient is not nervous/anxious.           OBJECTIVE:   /79 (BP Location: Right arm, Patient Position: Sitting, Cuff Size: Adult Regular)   Pulse 80   Temp 98.4  F (36.9  C) (Oral)   Resp 18   Ht 1.549 m (5' 1\")   Wt 73.9 kg (162 lb 14.4 oz)   LMP 02/10/2023 (Approximate)   SpO2 98%   BMI 30.78 kg/m    Physical Exam  Vitals reviewed.   Constitutional:       General: She is not in acute distress.     Appearance: Normal " appearance. She is not ill-appearing or diaphoretic.   HENT:      Head: Normocephalic and atraumatic.      Right Ear: Tympanic membrane normal.      Left Ear: Tympanic membrane normal.      Nose: Nose normal. No congestion or rhinorrhea.      Mouth/Throat:      Mouth: Mucous membranes are moist.      Pharynx: Oropharynx is clear.   Eyes:      General: No scleral icterus.        Right eye: No discharge.         Left eye: No discharge.      Extraocular Movements: Extraocular movements intact.      Pupils: Pupils are equal, round, and reactive to light.   Neck:      Comments: Nontender right posterior cervical adenopathy  Cardiovascular:      Rate and Rhythm: Normal rate and regular rhythm.      Heart sounds: No murmur heard.  Pulmonary:      Effort: Pulmonary effort is normal.      Breath sounds: Normal breath sounds.   Abdominal:      General: Abdomen is flat. There is no distension.   Musculoskeletal:         General: No swelling.      Cervical back: Normal range of motion.   Lymphadenopathy:      Cervical: No cervical adenopathy.   Skin:     General: Skin is warm.      Capillary Refill: Capillary refill takes less than 2 seconds.   Neurological:      General: No focal deficit present.      Mental Status: She is alert.   Psychiatric:         Mood and Affect: Mood normal.         Behavior: Behavior normal.         Thought Content: Thought content normal.         Judgment: Judgment normal.         Diagnostic Test Results:  Labs reviewed in Epic  Results for orders placed or performed in visit on 02/27/23 (from the past 24 hour(s))   CBC with platelets   Result Value Ref Range    WBC Count 4.1 4.0 - 11.0 10e3/uL    RBC Count 4.37 3.80 - 5.20 10e6/uL    Hemoglobin 13.6 11.7 - 15.7 g/dL    Hematocrit 40.9 35.0 - 47.0 %    MCV 94 78 - 100 fL    MCH 31.1 26.5 - 33.0 pg    MCHC 33.3 31.5 - 36.5 g/dL    RDW 12.1 10.0 - 15.0 %    Platelet Count 269 150 - 450 10e3/uL   Hemoglobin A1c   Result Value Ref Range    Hemoglobin A1C  "5.9 (H) 0.0 - 5.6 %       ASSESSMENT/PLAN:   Tamica was seen today for physical.    Diagnoses and all orders for this visit:    Annual physical exam  -     CBC with platelets; Future  -     Comprehensive metabolic panel (BMP + Alb, Alk Phos, ALT, AST, Total. Bili, TP); Future  -     TSH with free T4 reflex; Future  -     Lipid panel reflex to direct LDL Non-fasting; Future  -     Hemoglobin A1c; Future  -     TSH with free T4 reflex; Future  -     CBC with platelets  -     Comprehensive metabolic panel (BMP + Alb, Alk Phos, ALT, AST, Total. Bili, TP)  -     Lipid panel reflex to direct LDL Non-fasting  -     Hemoglobin A1c  -     TSH with free T4 reflex    Dyspepsia  -     Adult GI  Referral - Procedure Only; Future    Colon cancer screening  -     Adult GI  Referral - Procedure Only; Future    Encounter for screening mammogram for breast cancer  -     *MA Screening Digital Bilateral; Future    Positive depression screening  Continue to monitor            COUNSELING:  Reviewed preventive health counseling, as reflected in patient instructions       Healthy diet/nutrition       Vision screening      BMI:   Estimated body mass index is 30.78 kg/m  as calculated from the following:    Height as of this encounter: 1.549 m (5' 1\").    Weight as of this encounter: 73.9 kg (162 lb 14.4 oz).   Weight management plan: Discussed healthy diet and exercise guidelines      She reports that she has never smoked. She has never used smokeless tobacco.          Victor Hugo Dietz MD  [facility]  "

## 2023-02-28 ENCOUNTER — TELEPHONE (OUTPATIENT)
Dept: FAMILY MEDICINE | Facility: CLINIC | Age: 48
End: 2023-02-28
Payer: COMMERCIAL

## 2023-02-28 NOTE — TELEPHONE ENCOUNTER
Called to discuss results, no answered.  Mixed hyperlipidemia, prediabetes.  Recommend follow-up in 6 months for recheck    Victor Hugo Dietz MD

## 2023-03-09 ENCOUNTER — OFFICE VISIT (OUTPATIENT)
Dept: FAMILY MEDICINE | Facility: CLINIC | Age: 48
End: 2023-03-09
Payer: COMMERCIAL

## 2023-03-09 VITALS
RESPIRATION RATE: 16 BRPM | HEART RATE: 88 BPM | BODY MASS INDEX: 30.21 KG/M2 | DIASTOLIC BLOOD PRESSURE: 68 MMHG | TEMPERATURE: 98.2 F | HEIGHT: 61 IN | OXYGEN SATURATION: 99 % | WEIGHT: 160 LBS | SYSTOLIC BLOOD PRESSURE: 122 MMHG

## 2023-03-09 DIAGNOSIS — T83.39XA RETAINED INTRAUTERINE CONTRACEPTIVE DEVICE (IUD): ICD-10-CM

## 2023-03-09 DIAGNOSIS — Z12.4 CERVICAL CANCER SCREENING: Primary | ICD-10-CM

## 2023-03-09 PROCEDURE — 87624 HPV HI-RISK TYP POOLED RSLT: CPT | Performed by: NURSE PRACTITIONER

## 2023-03-09 PROCEDURE — 99213 OFFICE O/P EST LOW 20 MIN: CPT | Performed by: NURSE PRACTITIONER

## 2023-03-09 PROCEDURE — G0145 SCR C/V CYTO,THINLAYER,RESCR: HCPCS | Performed by: NURSE PRACTITIONER

## 2023-03-09 NOTE — PROGRESS NOTES
"  Assessment & Plan     Cervical cancer screening  - Pap Screen with HPV - recommended age 30 - 65 years    Retained intrauterine contraceptive device (IUD):  Reports that a copper IUD was placed while living in [place] 21 years ago, was seen in 2017 by GYN for removal, was not able to be removed in clinic, it was suggested she have it removed by hysteroscopy, this appointment was not set up.   - Ob/Gyn Referral      Return in about 1 year (around 3/9/2024) for Physical Exam.    Susan Haase, APRN CNP  [facility] DAMON Davey is a 47 year old accompanied by her friend presenting for the following health issues:  Gyn Exam (Pap)      History of Present Illness       Reason for visit:  Pap smear    She eats 0-1 servings of fruits and vegetables daily.She consumes 0 sweetened beverage(s) daily.She exercises with enough effort to increase her heart rate 20 to 29 minutes per day.  She exercises with enough effort to increase her heart rate 4 days per week.   She is taking medications regularly.     LMP 2/10, lasting 3-4 days, regular, monthly.    Last pap normal in 2017.    Denies vaginal itching, discharge.  Is not sexually active.  Copper IUD:  Reports this was placed while living in [place] 21 years ago, was seen in 2017 by GYN for removal, was not able to be removed in clinic, it was suggested she have it removed by hysteroscopy, this appointment was not set up.       Review of Systems   CONSTITUTIONAL: NEGATIVE for fever, chills, change in weight  : see HPI      Objective    /68   Pulse 88   Temp 98.2  F (36.8  C) (Oral)   Resp 16   Ht 1.549 m (5' 1\")   Wt 72.6 kg (160 lb)   LMP 02/10/2023 (Approximate)   SpO2 99%   BMI 30.23 kg/m    Body mass index is 30.23 kg/m .  Physical Exam   GENERAL: healthy, alert and no distress   (female): normal female external genitalia, normal urethral meatus, vaginal mucosa, normal cervix/adnexa/uterus without masses or discharge.  IUD " strings are not visualized.

## 2023-03-10 ENCOUNTER — MYC MEDICAL ADVICE (OUTPATIENT)
Dept: FAMILY MEDICINE | Facility: CLINIC | Age: 48
End: 2023-03-10

## 2023-03-13 ENCOUNTER — TELEPHONE (OUTPATIENT)
Dept: GASTROENTEROLOGY | Facility: CLINIC | Age: 48
End: 2023-03-13
Payer: COMMERCIAL

## 2023-03-13 LAB
BKR LAB AP GYN ADEQUACY: NORMAL
BKR LAB AP GYN INTERPRETATION: NORMAL
BKR LAB AP HPV REFLEX: NORMAL
BKR LAB AP LMP: NORMAL
BKR LAB AP PREVIOUS ABNORMAL: NORMAL
PATH REPORT.COMMENTS IMP SPEC: NORMAL
PATH REPORT.COMMENTS IMP SPEC: NORMAL
PATH REPORT.RELEVANT HX SPEC: NORMAL

## 2023-03-13 NOTE — TELEPHONE ENCOUNTER
Screening Questions  BLUE  KIND OF PREP RED  LOCATION [review exclusion criteria] GREEN  SEDATION TYPE        Y Are you active on mychart?       ROSY FOWLER Ordering/Referring Provider?        [employer] What type of coverage do you have?      N Have you had a positive covid test in the last 14 days?     29.3 1. BMI  [BMI 40+ - review exclusion criteria]    Y  2. Are you able to give consent for your medical care? [IF NO,RN REVIEW]          N  3. Are you taking any prescription pain medications on a routine schedule   (ex narcotics: oxycodone, roxicodone, oxycontin,  and percocet)? [RN Review]        N  3a. EXTENDED PREP What kind of prescription?     N 4. Do you have any chemical dependencies such as alcohol, street drugs, or methadone?        **If yes 3- 5 , please schedule with MAC sedation.**          IF YES TO ANY 6 - 10 - HOSPITAL SETTING ONLY.     N 6.   Do you need assistance transferring?     N 7.   Have you had a heart or lung transplant?    N 8.   Are you currently on dialysis?   N 9.   Do you use daily home oxygen?   N 10. Do you take nitroglycerin?   10a. N If yes, how often?     11. [FEMALES]   Are you currently pregnant?    11a.  If yes, how many weeks? [ Greater than 12 weeks, OR NEEDED]    N 12. Do you have Pulmonary Hypertension? *NEED PAC APPT AT [facility] w/ MAC*     N 13. [review exclusion criteria]  Do you have any implantable devices in your body (pacemaker, defib, LVAD)?    N 14. In the past 6 months, have you had any heart related issues including cardiomyopathy or heart attack?     14a. N If yes, did it require cardiac stenting if so when?     N 15. Have you had a stroke or Transient ischemic attack (TIA - aka  mini stroke ) within 6 months?      N 16. Do you have mod to severe Obstructive Sleep Apnea?  [Hospital only]    N 17. Do you have SEVERE AND UNCONTROLLED asthma? *NEED PAC APPT AT [facility] w/MAC*     18. Are you currently taking any blood thinners?     18a. No. Continue to 19.   18b. Yes/no  "Blood Thinner: No [CONTINUE TO #19]    N 19. Do you take the medication Phentermine?    19a. If yes, \"Hold for 7 days before procedure.  Please consult your prescribing provider if you have questions about holding this medication.\"     N  20. Do you have chronic kidney disease?      N  21. Do you have a diagnosis of diabetes?     N  22. On a regular basis do you go 3-5 days between bowel movements?      23. Preferred LOCAL Pharmacy for Pre Prescription    [ LIST ONLY ONE PHARMACY]          [facility] [id number] [place] [facility] [id number]        - CLOSING REMINDERS -    Informed patient they will need an adult    Cannot take any type of public or medical transportation alone    Conscious Sedation- Needs  for 6 hours after the procedure       MAC/General-Needs  for 24 hours after procedure    Pre-Procedure Covid test to be completed [[facility] PCR Testing Required]    Confirmed Nurse will call to complete assessment       - SCHEDULING DETAILS -   Hospital Setting Required? If yes, what is the exclusion?: JOSEPH ZHU  Surgeon    05/19/2023  Date of Procedure  Lower Endoscopy [Colonoscopy] + EGD BRAVO  Type of Procedure Scheduled  [facility]-If you answer yes to questions #8, #20, #21Which Colonoscopy Prep was Sent?     MAC Sedation Type     N PAC / Pre-op Required                 "

## 2023-03-15 LAB
HUMAN PAPILLOMA VIRUS 16 DNA: NEGATIVE
HUMAN PAPILLOMA VIRUS 18 DNA: NEGATIVE
HUMAN PAPILLOMA VIRUS FINAL DIAGNOSIS: NORMAL
HUMAN PAPILLOMA VIRUS OTHER HR: NEGATIVE

## 2023-05-04 ENCOUNTER — TELEPHONE (OUTPATIENT)
Dept: GASTROENTEROLOGY | Facility: CLINIC | Age: 48
End: 2023-05-04
Payer: COMMERCIAL

## 2023-05-04 DIAGNOSIS — Z12.11 SPECIAL SCREENING FOR MALIGNANT NEOPLASMS, COLON: Primary | ICD-10-CM

## 2023-05-04 RX ORDER — BISACODYL 5 MG/1
TABLET, DELAYED RELEASE ORAL
Qty: 4 TABLET | Refills: 0 | Status: SHIPPED | OUTPATIENT
Start: 2023-05-04 | End: 2024-09-25

## 2023-05-04 NOTE — TELEPHONE ENCOUNTER
Pre assessment questions completed for upcoming Colonoscopy/Upper endoscopy (EGD) procedure scheduled on 5/19/23    [employer]  ID [id number]    COVID policy reviewed.     Pre-op exam? N/A    Reviewed procedural arrival time 0845, procedure time 0945 and facility location [facility]; [address] [place] [address], [address] Floor, [place] [address]    Designated  policy reviewed. Instructed to have someone stay 24 hours post procedure.     NSAIDs? No    Anticoagulation/blood thinners? No    Electronic implanted devices? No    Diabetic? No    Procedure indication: historyof h pylori, dyspepsia.    Bowel prep recommendation: Standard Golytely     Reviewed procedure prep instructions.     Prep instructions sent via [employer].  Bowel prep script sent to     [facility] [id number] [place] [facility] [address]     Patient verbalized understanding and had no questions or concerns at this time.    Maren Hanson RN  Endoscopy Procedure Pre Assessment RN

## 2023-05-04 NOTE — LETTER
May 4, 2023      Tamica Glass  [id number] [facility] [address]              Dear Tamica,        Colonoscopy/Upper endoscopy (EGD)     Procedure date: 5/19/23    Anticipated arrival time: 0845 AM   (Procedure Times are Subject to Change)    Facility [facility]; [address], 5th Floor, [place] [address] - Check in location: 5th Floor. Parking information: Paid self-parking is available in West surface lot directly across from the  main entrance of the [facility]. Entry and exit to this lot is on [facility]. In  addition, paid parking is also available in [employer] [facility] ([address]).  We have dedicated patient only spots on 1st floor of [employer] [facility].  services are available for patients with limited mobility. Services available Monday-Friday, 7:00a.m.-  5:00p.m.    Prep Instructions: Instructions on how to prepare for your upcoming procedure are found below. Deviation from instructions may result in less than desired outcomes and procedure may need to be rescheduled.      Policy: Please arrive with an adult who can drive you home after the exam and stay with you for the next 24 hours, unless your provider says otherwise. The  will need to be confirmed prior to the start of your procedure.     The medicines used in the exam will make you sleepy. You will not be able to drive. You cannot take public transportation,  ride share services, or non-medical taxi service without a responsible caregiver.  Medical transport services are allowed with the requirement that a responsible caregiver will receive you at your destination.  We will require confirmation of availability from caregiver prior to procedure.      If you are taking blood thinning medication: Medications such as Coumadin, Plavix, Rivaroxaban (Xarelto)..etc, may need to be temporarily stopped for multiple days before your scheduled procedure. Talk to your doctor. Your prescribing doctor will give you  directions to see if these medications should be changed or stopped prior to your exam. Procedure may be canceled if medications are not accurately held.     If you have Diabetes: Ask to have your exam early in the morning if possible. Call your doctor if you have questions regarding your diet modifications and/or diabetic medications during prep.       If you take Phentermine (Adipex-P, Lomaira): This MUST be held 7 days prior to your scheduled procedure. Your procedure will be canceled if this medication has not been held.     To reschedule or cancel your procedure: Please call our scheduling team at:   [facility]: [phone], option 2  Monday through Friday, 8 a.m. to 4:30 p.m.    ---------------------------------------------------------------------------------------------------------------------  STANDARD Golytely (Colyte, Nulytely)  Prep Instructions for your Colonoscopy    Bowel prep has been sent to [facility] [address] [place] [address]    Please read these instructions carefully at least 7 days prior to your colonoscopy procedure. Be sure to follow all directions completely. The inside of your colon must be clean to allow for a complete examination for the presence of any growths, polyps, and/or abnormalities, as well as their biopsy or removal. A number of tips are included in order to make this part of the procedure as comfortable as possible.    Getting ready:   A nurse will call you within a week of your exam to prescribe your bowel prep, review the prep instructions, and answer any other questions you have.   You must arrange for an adult to drive you home after your exam. Your colonoscopy cannot be done unless you have a ride. If you need to use public transportation, someone must ride with you and stay with you for a minimum of 6-24 hours.  Check with your insurance company to be sure they will cover this exam.    7 days before the exam:  Talk to your  prescribing provider: If you take blood thinners (such as Coumadin, Plavix, Xarelto, Eliquis, Lovenox, or others), these medications may need to be stopped temporarily before your procedure. Your prescribing provider will tell you what to do.   Talk to your prescribing provider: If you take prescription NSAIDS (such as Sulindac, Celebrex, Mobic, Relafen). Your prescribing provider will tell you what to do.   Stop taking fiber supplements (bran, Metamucil, Fibercon), multi-vitamins with iron, and medicines that contain iron.  Continue taking prescribed aspirin; talk to your prescribing doctor with any concerns.  Stop eating corn, nuts and foods with seeds.  These can stay in the colon for days.  If you have diabetes:  Ask to have your exam early in the morning.  Also, ask your doctor if you should change your diet or medicines.    3 days before the exam:  Begin a low-fiber diet: No raw fruits or vegetables, whole wheat, seeds, nuts, popcorn or other high-fiber foods (see list below). No Olestra (a fat substitute).    One day before the exam:  You can have a light, low-fiber breakfast. But drink only clear liquids after 9 a.m. (see list below). Drink at least 8 to 10 full glasses of clear liquids during the day.   Stop taking NSAID pain relievers, such as Advil, Ibuprofen, Motrin, etc.  You may take Tylenol.  Fill the jug that contains the Golytely powder with warm water. Cover and shake until well mixed. Use a full gallon of water. Chill for 3 hours, but do not add ice.  You will start drinking half of the Golytely solution at 6 p.m. The timing of drinking the 2nd half of the Golytely solution depends on your exam time. See Step 2 below.  After you start drinking the solution, stay near a toilet. You may have watery stools (diarrhea), mild cramping, bloating , and nausea.     Step One:  At 3 p.m., take 2 tablets of Dulcolax (bisacodyl).  At 6 p.m. start drinking the Golytely solution. Drink an 8-ounce glass every 15  minutes until the jug is half empty. Drink each glass quickly.     Step Two:   If you arrive before 11 AM:  At 11 p.m. on the day before your exam:  Take 2 Dulcolax (Bisacodyl) tablets.   Start drinking the other half of the Golytely jug. Drink one 8-ounce glass every 15 minutes until the jug is empty. Drink each glass quickly.  If you arrive after 11 AM:  At 6 AM on the day of the exam:  Take 2 tablets of Dulcolax (Bisacodyl).   Drink the other half of the Golytely jug.   Drink one 8-ounce glass every 15 minutes until the jug is empty.   Drink each glass quickly.   You should finish the prep 4 hours before the exam.      Day of exam:    You may take your necessary morning medications with sips of water  Do not take diabetes medicine by mouth until after your exam.  You may drink clear liquids only up until 2 hours before your arrival time.  Do not smoke, chew tobacco, or swallow anything, including water or gum for at least 2 hours before your arrival time. This is a safety issue. Your procedure could be cancelled if you do not follow directions.  Please arrive with a responsible adult who can take you home after the test and stay with you for a minimum of 24 hours: The medicine used will make you sleepy and forgetful. If you do not have someone to take you home, we will cancel your procedure. If using public transportation you must have someone to ride with you.  Please perform your nebulizer treatments and airway clearance therapy in the morning prior to the procedure (if applicable).  If you have asthma, bring your inhalers.      CLEAR LIQUIDS   You may have:  Water, tea, coffee (no milk or cream)  Soda pop, Gatorade (not red or purple)  Jell-O, Popsicles (no milk or fruit pieces - not red or purple)  Fat-free soup broth or bouillon  Plain hard candy, such as clear life savers (not red or purple)  Clear juices and fruit-flavored drinks, such as apple juice, white grape juice, Hi-C, and Wil-Aid (not red or  purple)   Do not have:  Milk or milk products such as ice cream, malts or shakes, or coffee creamer  Red or purple drinks of any kind such as cranberry juice or grape juice. Avoid red or purple Jell-O, Popsicles, Wil-Aid, sorbet, sherbet and candy  Juices with pulp such as orange, grapefruit, pineapple or tomato juice  Cream soups of any kind  Alcohol and beer  Protein drinks or protein powder     LOW FIBER DIET   You may have:    Starches: White bread, rolls, biscuits, croissants, Barbi toast, white flour tortillas, waffles, pancakes, B[place]an toast; white rice, noodles, pasta, macaroni; cooked and peeled potatoes; plain crackers, saltines; cooked farina or cream of rice; puffed rice, corn flakes, Rice Krispies, Special K   Vegetables: tender cooked and canned, vegetable broths  Fruits and fruit juices: Strained fruit juice, canned fruit without seeds or skin (not pineapple), applesauce, pear sauce, ripe bananas, melons (not watermelon)   Milk products: Milk (plain or flavored), cheese, cottage cheese, yogurt (no berries), custard, ice cream    Proteins: Tender, well-cooked ground beef, lamb, veal, ham, pork, chicken, turkey, fish or organ meats; eggs; creamy peanut butter   Fats and condiments:  Margarine, butter, oils, mayonnaise, sour cream, salad dressing, plain gravy; spices, cooked herbs; sugar, clear jelly, honey, syrup   Snacks, sweets and drinks: Pretzels, hard candy; plain cakes and cookies (no nuts or seeds); gelatin, plain pudding, sherbet, Popsicles; coffee, tea, carbonated ( fizzy ) drinks Do not have:    Starches: Breads or rolls that contain nuts, seeds or fruit; whole wheat or whole grain breads that contain more than 1 gram of fiber per slice; cornbread; corn or whole wheat tortillas; potatoes with skin; brown rice, wild rice, kasha (buckwheat), and oatmeal   Vegetables: Any raw or steamed vegetables; vegetables with seeds; corn in any form   Fruits and fruit juices: Prunes, prune juice, raisins  and other dried fruits, berries and other fruits with seeds, canned pineapple juices with pulp such as orange, grapefruit, pineapple or tomato juice  Milk products: Any yogurt with nuts, seeds or berries   Proteins: Tough, fibrous meats with gristle; cooked dried beans, peas or lentils; crunchy peanut butter  Fats and condiments: Pickles, olives, relish, horseradish; jam, marmalade, preserves   Snacks, sweets and drinks: Popcorn, nuts, seeds, granola, coconut, candies made with nuts or seeds; all desserts that contain nuts, seeds, raisins and other dried fruits, coconut, whole grains or bran.        FAQ:    How do you know if your colon is cleaned out?   After completing the bowel prep, your bowel movements should be all liquid and yellow. Your bowel movements will look similar to urine in the toilet. If there are pieces of stool (poop) in the toilet, or if you can't see to the bottom of the toilet, please call our office for advice. Call [phone] and ask to speak with a nurse.   Why do you need a responsible  to take you home and stay with you?  We require a responsible adult to take you home for your safety. The sedation medicines used to relax you during the procedure can impair your judgement and reaction time, make you forgetful and possible a little unsteady. Do not drive, make any important decisions, or sign any legal documents for 24 hours after your procedure.   It is normal to feel bloated and gassy after your procedure. Walking will help move the air through your colon. You can take non-aspirin pain relievers that contain acetaminophen (Tylenol).   When can you eat after your procedure?  You may resume your normal diet when you feel ready, unless advised otherwise by the doctor performing your procedure. Do not drink alcohol for 24 hours after your procedure.   You many resume normal activities (work, exercise, etc.) after 24 hours.   When will you get test results?  You should have your  procedure results and any lab results (if applicable) by letter, MyChart message, or phone call within 2 weeks. If you have any questions, please call the doctor that referred you for the procedure.       Thank you for choosing [facility] for your procedure. If you are sent a survey regarding your care, please take the time to complete the questionnaire. We value your feedback!             Updated: 6/22/2022

## 2023-05-06 ENCOUNTER — HEALTH MAINTENANCE LETTER (OUTPATIENT)
Age: 48
End: 2023-05-06

## 2023-05-11 ENCOUNTER — OFFICE VISIT (OUTPATIENT)
Dept: OBGYN | Facility: CLINIC | Age: 48
End: 2023-05-11
Payer: COMMERCIAL

## 2023-05-11 VITALS
HEART RATE: 72 BPM | DIASTOLIC BLOOD PRESSURE: 60 MMHG | WEIGHT: 160 LBS | BODY MASS INDEX: 30.21 KG/M2 | SYSTOLIC BLOOD PRESSURE: 118 MMHG | HEIGHT: 61 IN

## 2023-05-11 DIAGNOSIS — T83.39XA RETAINED INTRAUTERINE CONTRACEPTIVE DEVICE (IUD): Primary | ICD-10-CM

## 2023-05-11 PROCEDURE — 99203 OFFICE O/P NEW LOW 30 MIN: CPT | Performed by: OBSTETRICS & GYNECOLOGY

## 2023-05-11 NOTE — PROGRESS NOTES
SUBJECTIVE:                                                     Tamica Glass is a 48 year old female  who presents today for discussion of retained copper IUD.  I first saw her for the same in 2017, after an ultrasound which revealed a circular intrauterine device, suspected to be copper, with no strings attached.  This was placed in [place].  We attempted blind removal with an IUD hook in the clinic, but this was not successful.  I had discussed with her at that time returning for a hysteroscopic procedure in the clinic to have that removed.  She was unfortunately under the impression that this was being done today.  However, she was scheduled for routine clinic visit for IUD removal, and though we attempted to contact her to let her know that hysteroscopy would not be possible today, she either did not get those messages or did not understand that.    Today, we discussed with an  the risks and benefits of leaving the IUD in place versus attempting hysteroscopic removal.  We discussed that attempting hysteroscopic removal could be done typically in the clinic, with minimal local anesthetic.  This may cause some cramping or bleeding, but this is usually minimal.  We discussed the need for contraception afterwards, and she reports that she is not sexually active and does not need that.  She is interested in IUD removal.  We discussed the possibility that the IUD might not be removable hysteroscopically in clinic, at which point we would recommend an attempt under anesthesia in the operating room.      Problem list and histories reviewed & adjusted, as indicated.  Additional history: as documented.    Patient Active Problem List   Diagnosis     Hyperthyroidism     Graves disease     Thyroid nodule     Family history of gastric cancer     Helicobacter positive gastritis     Retained intrauterine contraceptive device (IUD)     History reviewed. No pertinent surgical history.   Social History     Tobacco  Use     Smoking status: Never     Smokeless tobacco: Never   Vaping Use     Vaping status: Not on file   Substance Use Topics     Alcohol use: No      Problem (# of Occurrences) Relation (Name,Age of Onset)    Hypertension (1) Mother    Coronary Artery Disease (1) Mother: onset after age 50.             bisacodyl (DULCOLAX) 5 MG EC tablet, Take 2 tablets at 3 pm the day before your procedure. If your procedure is before 11 am, take 2 additional tablets at 11 pm. If your procedure is after 11 am, take 2 additional tablets at 6 am. For additional instructions refer to your colonoscopy prep instructions.  polyethylene glycol (GOLYTELY) 236 g suspension, The night before the exam at 6 pm drink an 8-ounce glass every 15 minutes until the jug is half empty. If you arrive before 11 AM: Drink the other half of the Golytely jug at 11 PM night before procedure. If you arrive after 11 AM: Drink the other half of the Golytely jug at 6 AM day of procedure. For additional instructions refer to your colonoscopy prep instructions.    No current facility-administered medications on file prior to visit.    No Known Allergies    ROS:  Negative other than as noted in HPI.    OBJECTIVE:     Exam:  Constitutional:  Appearance: Well nourished, well developed alert, in no acute distress  Neurologic/Psychiatric:  Mental Status:  Oriented X3       In-Clinic Test Results:  No results found for this or any previous visit (from the past 24 hour(s)).    ASSESSMENT/PLAN:                                                        ICD-10-CM    1. Retained intrauterine contraceptive device (IUD)  T83.39XA             She had a family member present with her today.  This family member interpreted for her.  We reviewed the risk, benefits, and alternatives to the procedure, as well as step-by-step how the procedure is performed.  She can expect minimal downtime after the procedure as only local anesthetic is necessary.  She is interested in proceeding as  soon as possible.  We did discuss the need to ideally schedule this in the first half of the cycle to provide better visualization.  In preparation for the visit today, I did review her prior records including the ultrasound images, and again seen is a circular copper IUD.  I do think this would be amenable to hysteroscopic removal, though there is always a chance of the IUD becoming embedded given the length of time it has been in place.  I think this is less likely with the circular IUD then with a copper T.    She will schedule return for hysteroscopic removal as that is available.    Rosalba Moon MD  [facility]

## 2023-05-11 NOTE — NURSING NOTE
"Chief Complaint   Patient presents with     IUD       Initial /60   Pulse 72   Ht 1.549 m (5' 1\")   Wt 72.6 kg (160 lb)   LMP 2023 (Exact Date)   BMI 30.23 kg/m   Estimated body mass index is 30.23 kg/m  as calculated from the following:    Height as of this encounter: 1.549 m (5' 1\").    Weight as of this encounter: 72.6 kg (160 lb).  BP completed using cuff size: regular    Questioned patient about current smoking habits.  Pt. has never smoked.          The following HM Due: NONE      The following patient reported/Care Every where data was sent to:  P ABSTRACT QUALITY INITIATIVES [[id number]]  Maren Portillo LPN               "

## 2023-05-15 ENCOUNTER — PATIENT OUTREACH (OUTPATIENT)
Dept: GASTROENTEROLOGY | Facility: CLINIC | Age: 48
End: 2023-05-15
Payer: COMMERCIAL

## 2023-05-17 NOTE — TELEPHONE ENCOUNTER
Patient's friend Maren calling regarding message received on [employer] regarding BRAVO appointment needed. Verbal consent given from patient to speak with Maren. Patient is not English speaking.     Patient is scheduled for colonoscopy/EGD w/BRAVO on 5/19/23.    [employer] message was sent to patient regarding scheduling appointment to return BRAVO recorder. This was sent by Cady Richardson [phone]. Per Maren they did call and leave a message with Cady but they were confused as to if they needed to schedule a BRAVO or not.     Informed Maren that procedure is scheduled and that nurse was calling to schedule when to return recorder. If they have left a voicemail with Cady then nurse should be returning call to schedule.     Reviewed prep instructions. Patient denies taking any PPI medications. Maren had no further questions or concerns at this time.     Maren Donovan RN  Endoscopy Procedure Pre Assessment RN

## 2023-05-18 ENCOUNTER — PATIENT OUTREACH (OUTPATIENT)
Dept: GASTROENTEROLOGY | Facility: CLINIC | Age: 48
End: 2023-05-18
Payer: COMMERCIAL

## 2023-05-18 NOTE — TELEPHONE ENCOUNTER
Returned call to pt's family to review information and instructions for upcoming Newman study.  Maren  [phone]  Pt scheduled for monitor return on 5/24

## 2023-05-19 ENCOUNTER — HOSPITAL ENCOUNTER (OUTPATIENT)
Facility: AMBULATORY SURGERY CENTER | Age: 48
Discharge: HOME | End: 2023-05-19
Attending: INTERNAL MEDICINE
Payer: COMMERCIAL

## 2023-05-19 ENCOUNTER — ANESTHESIA (OUTPATIENT)
Dept: SURGERY | Facility: AMBULATORY SURGERY CENTER | Age: 48
End: 2023-05-19
Payer: COMMERCIAL

## 2023-05-19 ENCOUNTER — ANESTHESIA EVENT (OUTPATIENT)
Dept: SURGERY | Facility: AMBULATORY SURGERY CENTER | Age: 48
End: 2023-05-19
Payer: COMMERCIAL

## 2023-05-19 VITALS
HEART RATE: 82 BPM | TEMPERATURE: 97.8 F | OXYGEN SATURATION: 100 % | BODY MASS INDEX: 30.21 KG/M2 | SYSTOLIC BLOOD PRESSURE: 119 MMHG | DIASTOLIC BLOOD PRESSURE: 74 MMHG | HEIGHT: 61 IN | WEIGHT: 160 LBS | RESPIRATION RATE: 16 BRPM

## 2023-05-19 VITALS — HEART RATE: 64 BPM

## 2023-05-19 LAB
COLONOSCOPY: NORMAL
UPPER GI ENDOSCOPY: NORMAL

## 2023-05-19 PROCEDURE — 88305 TISSUE EXAM BY PATHOLOGIST: CPT | Mod: TC | Performed by: INTERNAL MEDICINE

## 2023-05-19 PROCEDURE — 91035 G-ESOPH REFLX TST W/ELECTROD: CPT | Mod: TC

## 2023-05-19 PROCEDURE — 45385 COLONOSCOPY W/LESION REMOVAL: CPT | Mod: 33

## 2023-05-19 PROCEDURE — 43239 EGD BIOPSY SINGLE/MULTIPLE: CPT

## 2023-05-19 PROCEDURE — 88305 TISSUE EXAM BY PATHOLOGIST: CPT | Mod: 26 | Performed by: STUDENT IN AN ORGANIZED HEALTH CARE EDUCATION/TRAINING PROGRAM

## 2023-05-19 RX ORDER — PROPOFOL 10 MG/ML
INJECTION, EMULSION INTRAVENOUS PRN
Status: DISCONTINUED | OUTPATIENT
Start: 2023-05-19 | End: 2023-05-19

## 2023-05-19 RX ORDER — PROPOFOL 10 MG/ML
INJECTION, EMULSION INTRAVENOUS CONTINUOUS PRN
Status: DISCONTINUED | OUTPATIENT
Start: 2023-05-19 | End: 2023-05-19

## 2023-05-19 RX ORDER — ONDANSETRON 2 MG/ML
4 INJECTION INTRAMUSCULAR; INTRAVENOUS
Status: DISCONTINUED | OUTPATIENT
Start: 2023-05-19 | End: 2023-05-19 | Stop reason: HOSPADM

## 2023-05-19 RX ORDER — ONDANSETRON 4 MG/1
4 TABLET, ORALLY DISINTEGRATING ORAL EVERY 6 HOURS PRN
Status: DISCONTINUED | OUTPATIENT
Start: 2023-05-19 | End: 2023-05-20 | Stop reason: HOSPADM

## 2023-05-19 RX ORDER — NALOXONE HYDROCHLORIDE 0.4 MG/ML
0.4 INJECTION, SOLUTION INTRAMUSCULAR; INTRAVENOUS; SUBCUTANEOUS
Status: DISCONTINUED | OUTPATIENT
Start: 2023-05-19 | End: 2023-05-20 | Stop reason: HOSPADM

## 2023-05-19 RX ORDER — LIDOCAINE HYDROCHLORIDE 20 MG/ML
INJECTION, SOLUTION INFILTRATION; PERINEURAL PRN
Status: DISCONTINUED | OUTPATIENT
Start: 2023-05-19 | End: 2023-05-19

## 2023-05-19 RX ORDER — PROCHLORPERAZINE MALEATE 10 MG
10 TABLET ORAL EVERY 6 HOURS PRN
Status: DISCONTINUED | OUTPATIENT
Start: 2023-05-19 | End: 2023-05-20 | Stop reason: HOSPADM

## 2023-05-19 RX ORDER — SODIUM CHLORIDE, SODIUM LACTATE, POTASSIUM CHLORIDE, CALCIUM CHLORIDE 600; 310; 30; 20 MG/100ML; MG/100ML; MG/100ML; MG/100ML
INJECTION, SOLUTION INTRAVENOUS CONTINUOUS PRN
Status: DISCONTINUED | OUTPATIENT
Start: 2023-05-19 | End: 2023-05-19

## 2023-05-19 RX ORDER — NALOXONE HYDROCHLORIDE 0.4 MG/ML
0.2 INJECTION, SOLUTION INTRAMUSCULAR; INTRAVENOUS; SUBCUTANEOUS
Status: DISCONTINUED | OUTPATIENT
Start: 2023-05-19 | End: 2023-05-20 | Stop reason: HOSPADM

## 2023-05-19 RX ORDER — LIDOCAINE 40 MG/G
CREAM TOPICAL
Status: DISCONTINUED | OUTPATIENT
Start: 2023-05-19 | End: 2023-05-19 | Stop reason: HOSPADM

## 2023-05-19 RX ORDER — FLUMAZENIL 0.1 MG/ML
0.2 INJECTION, SOLUTION INTRAVENOUS
Status: ACTIVE | OUTPATIENT
Start: 2023-05-19 | End: 2023-05-19

## 2023-05-19 RX ORDER — ONDANSETRON 2 MG/ML
4 INJECTION INTRAMUSCULAR; INTRAVENOUS EVERY 6 HOURS PRN
Status: DISCONTINUED | OUTPATIENT
Start: 2023-05-19 | End: 2023-05-20 | Stop reason: HOSPADM

## 2023-05-19 RX ADMIN — LIDOCAINE HYDROCHLORIDE 60 MG: 20 INJECTION, SOLUTION INFILTRATION; PERINEURAL at 10:16

## 2023-05-19 RX ADMIN — SODIUM CHLORIDE, SODIUM LACTATE, POTASSIUM CHLORIDE, CALCIUM CHLORIDE: 600; 310; 30; 20 INJECTION, SOLUTION INTRAVENOUS at 09:56

## 2023-05-19 RX ADMIN — PROPOFOL 200 MCG/KG/MIN: 10 INJECTION, EMULSION INTRAVENOUS at 10:16

## 2023-05-19 RX ADMIN — PROPOFOL 70 MG: 10 INJECTION, EMULSION INTRAVENOUS at 10:16

## 2023-05-19 RX ADMIN — PROPOFOL 30 MG: 10 INJECTION, EMULSION INTRAVENOUS at 10:23

## 2023-05-19 NOTE — H&P
Tamica Glass  [id number]  female  48 year old      Reason for procedure/surgery: dyspepsia colon cancer screening.    Patient Active Problem List   Diagnosis     Hyperthyroidism     Graves disease     Thyroid nodule     Family history of gastric cancer     Helicobacter positive gastritis     Retained intrauterine contraceptive device (IUD)       Past Surgical History:  History reviewed. No pertinent surgical history.    Past Medical History:   Past Medical History:   Diagnosis Date     Graves disease        Social History:   Social History     Tobacco Use     Smoking status: Never     Smokeless tobacco: Never   Vaping Use     Vaping status: Not on file   Substance Use Topics     Alcohol use: No       Family History:   Family History   Problem Relation Age of Onset     Coronary Artery Disease Mother         onset after age 50.      Hypertension Mother        Allergies: No Known Allergies    Active Medications:   Current Outpatient Medications   Medication Sig Dispense Refill     bisacodyl (DULCOLAX) 5 MG EC tablet Take 2 tablets at 3 pm the day before your procedure. If your procedure is before 11 am, take 2 additional tablets at 11 pm. If your procedure is after 11 am, take 2 additional tablets at 6 am. For additional instructions refer to your colonoscopy prep instructions. 4 tablet 0     polyethylene glycol (GOLYTELY) 236 g suspension The night before the exam at 6 pm drink an 8-ounce glass every 15 minutes until the jug is half empty. If you arrive before 11 AM: Drink the other half of the Golytely jug at 11 PM night before procedure. If you arrive after 11 AM: Drink the other half of the Golytely jug at 6 AM day of procedure. For additional instructions refer to your colonoscopy prep instructions. 4000 mL 0       Systemic Review:   CONSTITUTIONAL: NEGATIVE for fever, chills, change in weight  ENT/MOUTH: NEGATIVE for ear, mouth and throat problems  RESP: NEGATIVE for significant cough or SOB  CV: NEGATIVE for  "chest pain, palpitations or peripheral edema    Physical Examination:   Vital Signs: /86 (BP Location: Right arm)   Pulse 72   Temp 97  F (36.1  C) (Temporal)   Resp 16   Ht 1.549 m (5' 1\")   Wt 72.6 kg (160 lb)   SpO2 97%   BMI 30.23 kg/m    GENERAL: healthy, alert and no distress  NECK: no adenopathy, no asymmetry, masses, or scars  RESP: lungs clear to auscultation - no rales, rhonchi or wheezes  CV: regular rate and rhythm, normal S1 S2, no S3 or S4, no murmur, click or rub, no peripheral edema and peripheral pulses strong  ABDOMEN: soft, nontender, no hepatosplenomegaly, no masses and bowel sounds normal  MS: no gross musculoskeletal defects noted, no edema    ASA: 2    Mallampati Score: 2    Plan: Appropriate to proceed as scheduled.      Phi Law MD  5/19/2023    PCP:  No Ref-Primary, Physician    "

## 2023-05-19 NOTE — ANESTHESIA CARE TRANSFER NOTE
Patient: Tamica Glass    Procedure: Procedure(s):  COLONOSCOPY, WITH POLYPECTOMY  EGD, gastroesophageal reflux test with mucosal PH electrode WITH BIOPSY       Diagnosis: Colon cancer screening [Z12.11]  Dyspepsia [R10.13]  Diagnosis Additional Information: No value filed.    Anesthesia Type:   No value filed.     Note:    Oropharynx: oropharynx clear of all foreign objects and spontaneously breathing  Level of Consciousness: awake  Oxygen Supplementation: room air    Independent Airway: airway patency satisfactory and stable  Dentition: dentition unchanged  Vital Signs Stable: post-procedure vital signs reviewed and stable  Report to RN Given: handoff report given  Patient transferred to: Phase II    Handoff Report: Identifed the Patient, Identified the Reponsible Provider, Reviewed the pertinent medical history, Discussed the surgical course, Reviewed Intra-OP anesthesia mangement and issues during anesthesia, Set expectations for post-procedure period and Allowed opportunity for questions and acknowledgement of understanding      Vitals:  Vitals Value Taken Time   /72    Temp 98    Pulse 78    Resp 12    SpO2 98        Electronically Signed By: JESUS ZALDIVAR  May 19, 2023  10:56 AM

## 2023-05-20 NOTE — ANESTHESIA PREPROCEDURE EVALUATION
Anesthesia Pre-Procedure Evaluation    Patient: Tamica Glass   MRN: [id number] : 1975        Procedure : Procedure(s):  COLONOSCOPY, WITH POLYPECTOMY  EGD, gastroesophageal reflux test with mucosal PH electrode WITH BIOPSY          Past Medical History:   Diagnosis Date     Graves disease       History reviewed. No pertinent surgical history.   No Known Allergies   Social History     Tobacco Use     Smoking status: Never     Smokeless tobacco: Never   Vaping Use     Vaping status: Not on file   Substance Use Topics     Alcohol use: No      Wt Readings from Last 1 Encounters:   23 72.6 kg (160 lb)        Anesthesia Evaluation   Pt has had prior anesthetic.     No history of anesthetic complications       ROS/MED HX  ENT/Pulmonary:  - neg pulmonary ROS     Neurologic:  - neg neurologic ROS     Cardiovascular:  - neg cardiovascular ROS     METS/Exercise Tolerance: >4 METS    Hematologic:       Musculoskeletal:       GI/Hepatic: Comment: H. pylori    (+) GERD,  (-) liver disease   Renal/Genitourinary:    (-) renal disease   Endo:     (+) thyroid problem,  hyperthyroidism, Obesity,     Psychiatric/Substance Use:       Infectious Disease:       Malignancy:       Other:            Physical Exam    Airway  airway exam normal           Respiratory Devices and Support         Dental       (+) Minor Abnormalities - some fillings, tiny chips      Cardiovascular   cardiovascular exam normal          Pulmonary   pulmonary exam normal                OUTSIDE LABS:  CBC:   Lab Results   Component Value Date    WBC 4.1 2023    WBC 4.5 2019    HGB 13.6 2023    HGB 13.0 2019    HCT 40.9 2023    HCT 39.5 2019     2023     2019     BMP:   Lab Results   Component Value Date     2023     2019    POTASSIUM 3.7 2023    POTASSIUM 3.6 2019    CHLORIDE 103 2023    CHLORIDE 111 (H) 2019    CO2 22 2023    CO2 22  05/06/2019    BUN 14.0 02/27/2023    BUN 22 05/06/2019    CR 0.56 02/27/2023    CR 0.57 05/06/2019     (H) 02/27/2023     (H) 05/06/2019     COAGS: No results found for: PTT, INR, FIBR  POC: No results found for: BGM, HCG, HCGS  HEPATIC:   Lab Results   Component Value Date    ALBUMIN 4.5 02/27/2023    PROTTOTAL 8.1 02/27/2023    ALT 15 02/27/2023    AST 22 02/27/2023    ALKPHOS 59 02/27/2023    BILITOTAL 0.2 02/27/2023     OTHER:   Lab Results   Component Value Date    A1C 5.9 (H) 02/27/2023    DELILAH 9.7 02/27/2023    TSH 3.00 02/27/2023    T4 1.01 10/08/2019    SED 16 04/26/2017       Anesthesia Plan    ASA Status:  2   NPO Status:  NPO Appropriate    Anesthesia Type: MAC.     - Reason for MAC: straight local not clinically adequate   Induction: Intravenous.   Maintenance: TIVA.        Consents    Anesthesia Plan(s) and associated risks, benefits, and realistic alternatives discussed. Questions answered and patient/representative(s) expressed understanding.    - Discussed:     - Discussed with:  Patient         Postoperative Care       PONV prophylaxis: Ondansetron (or other 5HT-3)     Comments:                Garret Cheng MD

## 2023-05-20 NOTE — ANESTHESIA POSTPROCEDURE EVALUATION
Patient: Tamica Glass    Procedure: Procedure(s):  COLONOSCOPY, WITH POLYPECTOMY  EGD, gastroesophageal reflux test with mucosal PH electrode WITH BIOPSY       Anesthesia Type:  MAC    Note:  Disposition: Outpatient   Postop Pain Control: Uneventful            Sign Out: Well controlled pain   PONV: No   Neuro/Psych: Uneventful            Sign Out: Acceptable/Baseline neuro status   Airway/Respiratory: Uneventful            Sign Out: Acceptable/Baseline resp. status   CV/Hemodynamics: Uneventful            Sign Out: Acceptable CV status; No obvious hypovolemia; No obvious fluid overload   Other NRE: NONE   DID A NON-ROUTINE EVENT OCCUR? No           Last vitals:  Vitals Value Taken Time   /74 05/19/23 1209   Temp 36.6  C (97.8  F) 05/19/23 1209   Pulse 82 05/19/23 1105   Resp 16 05/19/23 1209   SpO2 100 % 05/19/23 1209       Electronically Signed By: Garret Cheng MD  May 20, 2023  2:08 PM

## 2023-05-24 ENCOUNTER — ALLIED HEALTH/NURSE VISIT (OUTPATIENT)
Dept: GASTROENTEROLOGY | Facility: CLINIC | Age: 48
End: 2023-05-24
Payer: COMMERCIAL

## 2023-05-24 DIAGNOSIS — B96.81 HELICOBACTER POSITIVE GASTRITIS: Primary | ICD-10-CM

## 2023-05-24 DIAGNOSIS — K29.70 HELICOBACTER POSITIVE GASTRITIS: Primary | ICD-10-CM

## 2023-05-24 LAB
PATH REPORT.COMMENTS IMP SPEC: NORMAL
PATH REPORT.FINAL DX SPEC: NORMAL
PATH REPORT.GROSS SPEC: NORMAL
PATH REPORT.MICROSCOPIC SPEC OTHER STN: NORMAL
PATH REPORT.RELEVANT HX SPEC: NORMAL
PHOTO IMAGE: NORMAL

## 2023-05-24 NOTE — PROGRESS NOTES
Bravo monitor and diary returned; accuracy of entries verified with patient.   Newman study completion confirmed.  Data uploaded and sent to reading provider for interpretation.  Reading Provider:  Dr Garcias

## 2023-06-15 ENCOUNTER — OFFICE VISIT (OUTPATIENT)
Dept: OBGYN | Facility: CLINIC | Age: 48
End: 2023-06-15
Payer: COMMERCIAL

## 2023-06-15 ENCOUNTER — TELEPHONE (OUTPATIENT)
Dept: OBGYN | Facility: CLINIC | Age: 48
End: 2023-06-15

## 2023-06-15 VITALS — DIASTOLIC BLOOD PRESSURE: 82 MMHG | SYSTOLIC BLOOD PRESSURE: 110 MMHG | WEIGHT: 157 LBS | BODY MASS INDEX: 29.66 KG/M2

## 2023-06-15 DIAGNOSIS — N84.0 ENDOMETRIAL POLYP: ICD-10-CM

## 2023-06-15 DIAGNOSIS — T83.39XA RETAINED INTRAUTERINE CONTRACEPTIVE DEVICE (IUD): Primary | ICD-10-CM

## 2023-06-15 PROCEDURE — 58579 UNLISTED HYSTSC PX UTERUS: CPT | Mod: 53 | Performed by: OBSTETRICS & GYNECOLOGY

## 2023-06-15 NOTE — NURSING NOTE
"Chief Complaint   Patient presents with     IUD     Hysteroscopic IUD removal       Initial /82   Wt 71.2 kg (157 lb)   LMP 2023 (Exact Date)   BMI 29.66 kg/m   Estimated body mass index is 29.66 kg/m  as calculated from the following:    Height as of 23: 1.549 m (5' 1\").    Weight as of this encounter: 71.2 kg (157 lb).  BP completed using cuff size: regular    Questioned patient about current smoking habits.  Pt. has never smoked.          "

## 2023-06-15 NOTE — TELEPHONE ENCOUNTER
Type of surgery: hysteroscopy, polypectomy with myosure, iud removal  Location of surgery: [facility]  Date and time of surgery: 8/30/23 @ 7:30 am  Surgeon: Dr. Moon  Pre-Op Appt Date: 8/7/23  Post-Op Appt Date: Patient advised to schedule.   Packet sent out: Yes  Pre-cert/Authorization completed:  No  Date: 6/15/23

## 2023-06-15 NOTE — TELEPHONE ENCOUNTER
Surgeon:ULISES CHESTER   Location: [facility]   Date/time preference:  per patient     Surgery:  hysteroscopy, polypectomy, IUD removal   Length of Surgery:  30 min   Diagnosis:  retained IUD, endometrial polyp   Anesthesia type:  GENERAL     Special instructions / equipment:  Myosure   Am admit or same day: SAME DAY   Bowel prep: No   Pre op: PCP   Office visit with surgeon prior to surgery: No   Schedule postop visit: 2  weeks     Ulises Cohen MD

## 2023-06-15 NOTE — PROGRESS NOTES
GYNECOLOGIC CLINIC PROCEDURE NOTE    Pre-procedure Diagnosis: Retained copper circular IUD  Post-procedure Diagnosis: Same plus endometrial polyps  Procedure(s): Office hysteroscopy  Surgeon: Rosalba Moon MD   Type of anesthesia: Paracervical block  Complications: None  Specimen(s) removed: None    Indications:   Retained IUD which she desires to have removed.  Risks, benefits and alternative treatments have been discussed with the patient. Informed consent obtained.     Procedure:   The patient was placed in the dorsal lithotomy position.  Anesthesia was obtained with 1% lidocaine with epinephrine and a one-to-one mix with bupivacaine.  10 cc total were used for paracervical block.  Tenaculum was placed on the anterior lip of the cervix after the cervix and vagina were prepped with Betadine.  Dilators were used to dilate the cervix to accommodate a 6 mm Hegar dilator.  The hysteroscope was introduced through the internal os and immediately apparent was a large endometrial polyp.  There appeared to be a smaller polyp above this.  Due to patient discomfort and the size of the polyp I was unable to maneuver around it to the extent that I could see any portion of the IUD.  Therefore the hysteroscope was removed, tenaculum was removed.  Hemostasis was obtained with silver nitrate, and was noted to be good. The patient tolerated the procedure well and there were no apparent complications.    The plan will be to schedule outpatient hysteroscopy with polypectomy with the MyoSure and retrieval of the retained IUD at that time.  Risk, benefits, and alternatives to this procedure were explained in detail with the  present and she chose to proceed.  She used a friend as her Mandarin  today and declined an online .    Rosalba Moon MD  [facility] Obstetrics and Gynecology

## 2023-08-07 ENCOUNTER — OFFICE VISIT (OUTPATIENT)
Dept: FAMILY MEDICINE | Facility: CLINIC | Age: 48
End: 2023-08-07
Payer: COMMERCIAL

## 2023-08-07 VITALS
SYSTOLIC BLOOD PRESSURE: 120 MMHG | HEART RATE: 74 BPM | OXYGEN SATURATION: 98 % | HEIGHT: 61 IN | RESPIRATION RATE: 16 BRPM | BODY MASS INDEX: 29.83 KG/M2 | TEMPERATURE: 97.1 F | DIASTOLIC BLOOD PRESSURE: 81 MMHG | WEIGHT: 158 LBS

## 2023-08-07 DIAGNOSIS — T83.39XA RETAINED INTRAUTERINE CONTRACEPTIVE DEVICE (IUD): ICD-10-CM

## 2023-08-07 DIAGNOSIS — R73.03 PREDIABETES: ICD-10-CM

## 2023-08-07 DIAGNOSIS — Z01.818 PRE-OP EVALUATION: Primary | ICD-10-CM

## 2023-08-07 DIAGNOSIS — Z12.31 ENCOUNTER FOR SCREENING MAMMOGRAM FOR BREAST CANCER: ICD-10-CM

## 2023-08-07 LAB
ERYTHROCYTE [DISTWIDTH] IN BLOOD BY AUTOMATED COUNT: 11.7 % (ref 10–15)
HCT VFR BLD AUTO: 41.4 % (ref 35–47)
HGB BLD-MCNC: 13.5 G/DL (ref 11.7–15.7)
MCH RBC QN AUTO: 29.9 PG (ref 26.5–33)
MCHC RBC AUTO-ENTMCNC: 32.6 G/DL (ref 31.5–36.5)
MCV RBC AUTO: 92 FL (ref 78–100)
PLATELET # BLD AUTO: 325 10E3/UL (ref 150–450)
RBC # BLD AUTO: 4.51 10E6/UL (ref 3.8–5.2)
WBC # BLD AUTO: 5.5 10E3/UL (ref 4–11)

## 2023-08-07 PROCEDURE — 84443 ASSAY THYROID STIM HORMONE: CPT | Performed by: FAMILY MEDICINE

## 2023-08-07 PROCEDURE — 36415 COLL VENOUS BLD VENIPUNCTURE: CPT | Performed by: FAMILY MEDICINE

## 2023-08-07 PROCEDURE — 99214 OFFICE O/P EST MOD 30 MIN: CPT | Performed by: FAMILY MEDICINE

## 2023-08-07 PROCEDURE — 85027 COMPLETE CBC AUTOMATED: CPT | Performed by: FAMILY MEDICINE

## 2023-08-07 PROCEDURE — 80048 BASIC METABOLIC PNL TOTAL CA: CPT | Performed by: FAMILY MEDICINE

## 2023-08-07 NOTE — PROGRESS NOTES
[facility]  [address] [address] [address]  Phone: [phone]  Primary Provider: No Ref-Primary, Physician  Pre-op Performing Provider:    ROSY FOWLER  VIDEO,       PREOPERATIVE EVALUATION:  Today's date: 8/7/2023    Tamica Glass is a 48 year old female who presents for a preoperative evaluation.       No data to display                Surgical Information:  Surgery/Procedure:  hysteroscopy, polypectomy with myosure, iud removal   Surgery Location: [facility]  Surgeon: Dr. Moon  Surgery Date: 08/30/2023  Time of Surgery: 730AM  Where patient plans to recover: At home with family  Fax number for surgical facility: Note does not need to be faxed, will be available electronically in Epic.    Assessment & Plan     The proposed surgical procedure is considered INTERMEDIATE risk.    Pre-op evaluation    Retained intrauterine contraceptive device (IUD)    Prediabetes    Encounter for screening mammogram for breast cancer  - MA Screen Bilateral w/George         - No identified additional risk factors other than previously addressed    Antiplatelet or Anticoagulation Medication Instructions:   - Patient is on no antiplatelet or anticoagulation medications.    Additional Medication Instructions:  Patient is on no additional chronic medications    RECOMMENDATION:  APPROVAL GIVEN to proceed with proposed procedure, without further diagnostic evaluation.    Subjective       HPI related to upcoming procedure: Patient is a pleasant 48-year-old female presents the clinic for preoperative evaluation prior to hysteroscopy, polypectomy and IUD removal        8/7/2023     3:07 PM   Preop Questions   1. Have you ever had a heart attack or stroke? No   2. Have you ever had surgery on your heart or blood vessels, such as a stent placement, a coronary artery bypass, or surgery on an artery in your head, neck, heart, or legs? No   3. Do you have chest pain with activity? No   4. Do you  have a history of  heart failure? No   5. Do you currently have a cold, bronchitis or symptoms of other infection? No   6. Do you have a cough, shortness of breath, or wheezing? No   7. Do you or anyone in your family have previous history of blood clots? No   8. Do you or does anyone in your family have a serious bleeding problem such as prolonged bleeding following surgeries or cuts? No   9. Have you ever had problems with anemia or been told to take iron pills? No   10. Have you had any abnormal blood loss such as black, tarry or bloody stools, or abnormal vaginal bleeding? No   11. Have you ever had a blood transfusion? No   12. Are you willing to have a blood transfusion if it is medically needed before, during, or after your surgery? Yes   13. Have you or any of your relatives ever had problems with anesthesia? No   14. Do you have sleep apnea, excessive snoring or daytime drowsiness? No   15. Do you have any artifical heart valves or other implanted medical devices like a pacemaker, defibrillator, or continuous glucose monitor? No   16. Do you have artificial joints? No   17. Are you allergic to latex? No   18. Is there any chance that you may be pregnant? No       Health Care Directive:  Patient does not have a Health Care Directive or Living Will: Discussed advance care planning with patient; however, patient declined at this time.    Preoperative Review of :   reviewed - no record of controlled substances prescribed.      Status of Chronic Conditions:  See problem list for active medical problems.  Problems all longstanding and stable, except as noted/documented.  See ROS for pertinent symptoms related to these conditions.    Review of Systems  Constitutional, neuro, ENT, endocrine, pulmonary, cardiac, gastrointestinal, genitourinary, musculoskeletal, integument and psychiatric systems are negative, except as otherwise noted.    Patient Active Problem List    Diagnosis Date Noted    Retained  "intrauterine contraceptive device (IUD) 03/09/2023     Priority: Medium    Helicobacter positive gastritis 08/31/2018     Priority: Medium    Family history of gastric cancer 08/28/2018     Priority: Medium    Graves disease 05/12/2017     Priority: Medium    Thyroid nodule 05/12/2017     Priority: Medium    Hyperthyroidism 04/21/2017     Priority: Medium      Past Medical History:   Diagnosis Date    Graves disease      Past Surgical History:   Procedure Laterality Date    COLONOSCOPY N/A 5/19/2023    Procedure: COLONOSCOPY, WITH POLYPECTOMY;  Surgeon: Phi Law MD;  Location: [facility] OR     Current Outpatient Medications   Medication Sig Dispense Refill    bisacodyl (DULCOLAX) 5 MG EC tablet Take 2 tablets at 3 pm the day before your procedure. If your procedure is before 11 am, take 2 additional tablets at 11 pm. If your procedure is after 11 am, take 2 additional tablets at 6 am. For additional instructions refer to your colonoscopy prep instructions. (Patient not taking: Reported on 8/7/2023) 4 tablet 0    polyethylene glycol (GOLYTELY) 236 g suspension The night before the exam at 6 pm drink an 8-ounce glass every 15 minutes until the jug is half empty. If you arrive before 11 AM: Drink the other half of the Golytely jug at 11 PM night before procedure. If you arrive after 11 AM: Drink the other half of the Golytely jug at 6 AM day of procedure. For additional instructions refer to your colonoscopy prep instructions. (Patient not taking: Reported on 8/7/2023) 4000 mL 0       No Known Allergies     Social History     Tobacco Use    Smoking status: Never     Passive exposure: Never    Smokeless tobacco: Never   Substance Use Topics    Alcohol use: No       History   Drug Use No         Objective     /81 (BP Location: Right arm, Patient Position: Chair, Cuff Size: Adult Regular)   Pulse 74   Temp 97.1  F (36.2  C) (Oral)   Resp 16   Ht 1.549 m (5' 1\")   Wt 71.7 kg (158 lb)   LMP 08/01/2023 (Exact " Date)   SpO2 98%   BMI 29.85 kg/m      Physical Exam    GENERAL APPEARANCE: healthy, alert and no distress     EYES: EOMI, PERRL     HENT: ear canals and TM's normal and nose and mouth without ulcers or lesions     NECK: no adenopathy, no asymmetry, masses, or scars and thyroid normal to palpation     RESP: lungs clear to auscultation - no rales, rhonchi or wheezes     CV: regular rates and rhythm, normal S1 S2, no S3 or S4 and no murmur, click or rub     ABDOMEN:  soft, nontender, no HSM or masses and bowel sounds normal     MS: extremities normal- no gross deformities noted, no evidence of inflammation in joints, FROM in all extremities.     SKIN: no suspicious lesions or rashes     NEURO: Normal strength and tone, sensory exam grossly normal, mentation intact and speech normal     PSYCH: mentation appears normal. and affect normal/bright     LYMPHATICS: No cervical adenopathy    Recent Labs   Lab Test 02/27/23  1233   HGB 13.6         POTASSIUM 3.7   CR 0.56   A1C 5.9*      CBC RESULTS:   Recent Labs   Lab Test 08/07/23  1550   WBC 5.5   RBC 4.51   HGB 13.5   HCT 41.4   MCV 92   MCH 29.9   MCHC 32.6   RDW 11.7        Last Comprehensive Metabolic Panel:  Lab Results   Component Value Date     08/07/2023    POTASSIUM 4.0 08/07/2023    CHLORIDE 102 08/07/2023    CO2 24 08/07/2023    ANIONGAP 13 08/07/2023     (H) 08/07/2023    BUN 19.0 08/07/2023    CR 0.56 08/07/2023    GFRESTIMATED >90 08/07/2023    DELILAH 9.7 08/07/2023           Diagnostics:    No EKG required, no history of coronary heart disease, significant arrhythmia, peripheral arterial disease or other structural heart disease.    Revised Cardiac Risk Index (RCRI):  The patient has the following serious cardiovascular risks for perioperative complications:   - No serious cardiac risks = 0 points     RCRI Interpretation: 0 points: Class I (very low risk - 0.4% complication rate)         Signed Electronically by: Victor Hugo Dietz,  MD  Copy of this evaluation report is provided to requesting physician.

## 2023-08-07 NOTE — PATIENT INSTRUCTIONS
For informational purposes only. Not to replace the advice of your health care provider. Copyright   2003,  [employer] [facility]. All rights reserved. Clinically reviewed by Alvina Pineda MD. [employer] [id number] - REV .  Preparing for Your Surgery  Getting started  A nurse will call you to review your health history and instructions. They will give you an arrival time based on your scheduled surgery time. Please be ready to share:  Your doctor's clinic name and phone number  Your medical, surgical, and anesthesia history  A list of allergies and sensitivities  A list of medicines, including herbal treatments and over-the-counter drugs  Whether the patient has a legal guardian (ask how to send us the papers in advance)  Please tell us if you're pregnant--or if there's any chance you might be pregnant. Some surgeries may injure a fetus (unborn baby), so they require a pregnancy test. Surgeries that are safe for a fetus don't always need a test, and you can choose whether to have one.   If you have a child who's having surgery, please ask for a copy of Preparing for Your Child's Surgery.    Preparing for surgery  Within 10 to 30 days of surgery: Have a pre-op exam (sometimes called an H&P, or History and Physical). This can be done at a clinic or pre-operative center.  If you're having a , you may not need this exam. Talk to your care team.  At your pre-op exam, talk to your care team about all medicines you take. If you need to stop any medicines before surgery, ask when to start taking them again.  We do this for your safety. Many medicines can make you bleed too much during surgery. Some change how well surgery (anesthesia) drugs work.  Call your insurance company to let them know you're having surgery. (If you don't have insurance, call [phone].)  Call your clinic if there's any change in your health. This includes signs of a cold or flu (sore throat, runny nose, cough, rash, fever). It also  includes a scrape or scratch near the surgery site.  If you have questions on the day of surgery, call your hospital or surgery center.  Eating and drinking guidelines  For your safety: Unless your surgeon tells you otherwise, follow the guidelines below.  Eat and drink as usual until 8 hours before you arrive for surgery. After that, no food or milk.  Drink clear liquids until 2 hours before you arrive. These are liquids you can see through, like water, Gatorade, and Propel Water. They also include plain black coffee and tea (no cream or milk), candy, and breath mints. You can spit out gum when you arrive.  If you drink alcohol: Stop drinking it the night before surgery.  If your care team tells you to take medicine on the morning of surgery, it's okay to take it with a sip of water.  Preventing infection  Shower or bathe the night before and morning of your surgery. Follow the instructions your clinic gave you. (If no instructions, use regular soap.)  Don't shave or clip hair near your surgery site. We'll remove the hair if needed.  Don't smoke or vape the morning of surgery. You may chew nicotine gum up to 2 hours before surgery. A nicotine patch is okay.  Note: Some surgeries require you to completely quit smoking and nicotine. Check with your surgeon.  Your care team will make every effort to keep you safe from infection. We will:  Clean our hands often with soap and water (or an alcohol-based hand rub).  Clean the skin at your surgery site with a special soap that kills germs.  Give you a special gown to keep you warm. (Cold raises the risk of infection.)  Wear special hair covers, masks, gowns and gloves during surgery.  Give antibiotic medicine, if prescribed. Not all surgeries need antibiotics.  What to bring on the day of surgery  Photo ID and insurance card  Copy of your health care directive, if you have one  Glasses and hearing aids (bring cases)  You can't wear contacts during surgery  Inhaler and eye  drops, if you use them (tell us about these when you arrive)  CPAP machine or breathing device, if you use them  A few personal items, if spending the night  If you have . . .  A pacemaker, ICD (cardiac defibrillator) or other implant: Bring the ID card.  An implanted stimulator: Bring the remote control.  A legal guardian: Bring a copy of the certified (court-stamped) guardianship papers.  Please remove any jewelry, including body piercings. Leave jewelry and other valuables at home.  If you're going home the day of surgery  You must have a responsible adult drive you home. They should stay with you overnight as well.  If you don't have someone to stay with you, and you aren't safe to go home alone, we may keep you overnight. Insurance often won't pay for this.  After surgery  If it's hard to control your pain or you need more pain medicine, please call your surgeon's office.  Questions?   If you have any questions for your care team, list them here: _________________________________________________________________________________________________________________________________________________________________________ ____________________________________ ____________________________________ ____________________________________    How to Take Your Medication Before Surgery  Patient is not on any medications.

## 2023-08-08 LAB
ANION GAP SERPL CALCULATED.3IONS-SCNC: 13 MMOL/L (ref 7–15)
BUN SERPL-MCNC: 19 MG/DL (ref 6–20)
CALCIUM SERPL-MCNC: 9.7 MG/DL (ref 8.6–10)
CHLORIDE SERPL-SCNC: 102 MMOL/L (ref 98–107)
CREAT SERPL-MCNC: 0.56 MG/DL (ref 0.51–0.95)
DEPRECATED HCO3 PLAS-SCNC: 24 MMOL/L (ref 22–29)
GFR SERPL CREATININE-BSD FRML MDRD: >90 ML/MIN/1.73M2
GLUCOSE SERPL-MCNC: 118 MG/DL (ref 70–99)
POTASSIUM SERPL-SCNC: 4 MMOL/L (ref 3.4–5.3)
SODIUM SERPL-SCNC: 139 MMOL/L (ref 136–145)
TSH SERPL DL<=0.005 MIU/L-ACNC: 1.35 UIU/ML (ref 0.3–4.2)

## 2023-08-23 ENCOUNTER — HOSPITAL ENCOUNTER (OUTPATIENT)
Dept: MAMMOGRAPHY | Facility: CLINIC | Age: 48
Discharge: HOME | End: 2023-08-23
Attending: FAMILY MEDICINE | Admitting: FAMILY MEDICINE
Payer: COMMERCIAL

## 2023-08-23 DIAGNOSIS — Z12.31 ENCOUNTER FOR SCREENING MAMMOGRAM FOR BREAST CANCER: ICD-10-CM

## 2023-08-23 PROCEDURE — 77067 SCR MAMMO BI INCL CAD: CPT

## 2023-08-30 ENCOUNTER — LAB REQUISITION (OUTPATIENT)
Dept: LAB | Facility: CLINIC | Age: 48
End: 2023-08-30
Payer: COMMERCIAL

## 2023-08-30 DIAGNOSIS — T83.39XA OTHER MECHANICAL COMPLICATION OF INTRAUTERINE CONTRACEPTIVE DEVICE, INITIAL ENCOUNTER: ICD-10-CM

## 2023-08-30 DIAGNOSIS — N84.0 POLYP OF CORPUS UTERI: ICD-10-CM

## 2023-08-30 PROCEDURE — 88305 TISSUE EXAM BY PATHOLOGIST: CPT | Mod: TC,ORL | Performed by: OBSTETRICS & GYNECOLOGY

## 2023-08-30 PROCEDURE — 88305 TISSUE EXAM BY PATHOLOGIST: CPT | Mod: 26 | Performed by: PATHOLOGY

## 2023-08-31 LAB
PATH REPORT.COMMENTS IMP SPEC: NORMAL
PATH REPORT.COMMENTS IMP SPEC: NORMAL
PATH REPORT.FINAL DX SPEC: NORMAL
PATH REPORT.GROSS SPEC: NORMAL
PATH REPORT.MICROSCOPIC SPEC OTHER STN: NORMAL
PATH REPORT.RELEVANT HX SPEC: NORMAL
PHOTO IMAGE: NORMAL

## 2024-01-02 ENCOUNTER — OFFICE VISIT (OUTPATIENT)
Dept: FAMILY MEDICINE | Facility: CLINIC | Age: 49
End: 2024-01-02
Payer: COMMERCIAL

## 2024-01-02 VITALS
TEMPERATURE: 98 F | WEIGHT: 161.4 LBS | RESPIRATION RATE: 18 BRPM | DIASTOLIC BLOOD PRESSURE: 88 MMHG | HEIGHT: 61 IN | HEART RATE: 78 BPM | SYSTOLIC BLOOD PRESSURE: 131 MMHG | BODY MASS INDEX: 30.47 KG/M2 | OXYGEN SATURATION: 98 %

## 2024-01-02 DIAGNOSIS — E66.811 CLASS 1 OBESITY DUE TO EXCESS CALORIES WITHOUT SERIOUS COMORBIDITY WITH BODY MASS INDEX (BMI) OF 30.0 TO 30.9 IN ADULT: ICD-10-CM

## 2024-01-02 DIAGNOSIS — L71.9 ROSACEA: ICD-10-CM

## 2024-01-02 DIAGNOSIS — R73.03 PREDIABETES: Primary | ICD-10-CM

## 2024-01-02 DIAGNOSIS — E66.09 CLASS 1 OBESITY DUE TO EXCESS CALORIES WITHOUT SERIOUS COMORBIDITY WITH BODY MASS INDEX (BMI) OF 30.0 TO 30.9 IN ADULT: ICD-10-CM

## 2024-01-02 LAB — HBA1C MFR BLD: 5.8 % (ref 0–5.6)

## 2024-01-02 PROCEDURE — 83036 HEMOGLOBIN GLYCOSYLATED A1C: CPT | Performed by: FAMILY MEDICINE

## 2024-01-02 PROCEDURE — 99214 OFFICE O/P EST MOD 30 MIN: CPT | Performed by: FAMILY MEDICINE

## 2024-01-02 PROCEDURE — 36415 COLL VENOUS BLD VENIPUNCTURE: CPT | Performed by: FAMILY MEDICINE

## 2024-01-02 RX ORDER — DOXYCYCLINE 100 MG/1
100 CAPSULE ORAL 2 TIMES DAILY
Qty: 60 CAPSULE | Refills: 0 | Status: SHIPPED | OUTPATIENT
Start: 2024-01-16 | End: 2024-09-25

## 2024-01-02 RX ORDER — PHENTERMINE HYDROCHLORIDE 15 MG/1
15 CAPSULE ORAL EVERY MORNING
Qty: 30 CAPSULE | Refills: 2 | Status: SHIPPED | OUTPATIENT
Start: 2024-01-02 | End: 2024-03-13

## 2024-01-02 RX ORDER — METRONIDAZOLE 10 MG/G
GEL TOPICAL DAILY
Qty: 60 G | Refills: 0 | Status: SHIPPED | OUTPATIENT
Start: 2024-01-02

## 2024-01-02 ASSESSMENT — ENCOUNTER SYMPTOMS
SHORTNESS OF BREATH: 0
FEVER: 0

## 2024-01-02 NOTE — PROGRESS NOTES
"  Assessment & Plan     Prediabetes  Controlled, continue current medical management monitor every 6 to 12 months.  - Hemoglobin A1c  - Hemoglobin A1c    Rosacea  Patient has classic features of rosacea, recommend topical metronidazole, if refractory can start doxycycline in 2 weeks.  If both treatments are refractory after 1-2 months, recommend dermatology evaluation  - metroNIDAZOLE (METROGEL) 1 % external gel  Dispense: 60 g; Refill: 0  - doxycycline hyclate (VIBRAMYCIN) 100 MG capsule  Dispense: 60 capsule; Refill: 0    Class 1 obesity due to excess calories without serious comorbidity with body mass index (BMI) of 30.0 to 30.9 in adult  Low cardiac risk, last EKG 2019 reviewed.  Diet exercise reviewed.  Okay to trial phentermine, monitor for adrenergic excess expect weight loss 2 pounds per week.  - phentermine (ADIPEX-P) 15 MG capsule  Dispense: 30 capsule; Refill: 2               BMI:   Estimated body mass index is 30.5 kg/m  as calculated from the following:    Height as of this encounter: 1.549 m (5' 1\").    Weight as of this encounter: 73.2 kg (161 lb 6.4 oz).           Victor Hugo Dietz MD  [facility] DAMON Davey is a 48 year old, presenting for the following health issues:  Derm Problem        1/2/2024    12:55 PM   Additional Questions   Roomed by kian MALDONADO       Rash  Onset/Duration: 6 months  Description  Location: face  Character: raised, red  Itching: moderate  Intensity:  moderate  Progression of Symptoms:  same  Accompanying signs and symptoms:   Fever: No  Body aches or joint pain: No  Sore throat symptoms: No  Recent cold symptoms: No  History:           Previous episodes of similar rash: None  New exposures:  None  Recent travel: No  Exposure to similar rash: No  Precipitating or alleviating factors: none  Therapies tried and outcome: none      Blotchy redness on the face with sometimes little dots.  Can be very itchy.  Avoiding any topical treatments at the " "time, no lotions or using any make-up.    Additionally having difficulty losing weight.  Patient wondering if starting weight loss meds is appropriate for her.  Recently got connected to a mandrin speaking single  at a health care facility in [place] and is excited to start.  Has been cutting back on carbs especially less rice portions and smaller bowls at home.      Review of Systems   Constitutional:  Negative for fever.   Respiratory:  Negative for shortness of breath.    Cardiovascular:  Negative for chest pain.            Objective    /88   Pulse 78   Temp 98  F (36.7  C) (Tympanic)   Resp 18   Ht 1.549 m (5' 1\")   Wt 73.2 kg (161 lb 6.4 oz)   LMP 12/19/2023 (Exact Date)   SpO2 98%   BMI 30.50 kg/m    Body mass index is 30.5 kg/m .  Physical Exam  Vitals reviewed.   Constitutional:       Appearance: She is not ill-appearing.   HENT:      Head:     Cardiovascular:      Rate and Rhythm: Normal rate and regular rhythm.   Pulmonary:      Effort: Pulmonary effort is normal.   Skin:     Comments: Picture from cell phone and in person observed, erythematous papules on an erythematous base, no telangiectasia                              "

## 2024-01-29 ENCOUNTER — PATIENT OUTREACH (OUTPATIENT)
Dept: CARE COORDINATION | Facility: CLINIC | Age: 49
End: 2024-01-29
Payer: COMMERCIAL

## 2024-02-12 ENCOUNTER — PATIENT OUTREACH (OUTPATIENT)
Dept: CARE COORDINATION | Facility: CLINIC | Age: 49
End: 2024-02-12
Payer: COMMERCIAL

## 2024-03-13 DIAGNOSIS — E66.09 CLASS 1 OBESITY DUE TO EXCESS CALORIES WITHOUT SERIOUS COMORBIDITY WITH BODY MASS INDEX (BMI) OF 30.0 TO 30.9 IN ADULT: ICD-10-CM

## 2024-03-13 DIAGNOSIS — E66.811 CLASS 1 OBESITY DUE TO EXCESS CALORIES WITHOUT SERIOUS COMORBIDITY WITH BODY MASS INDEX (BMI) OF 30.0 TO 30.9 IN ADULT: ICD-10-CM

## 2024-03-13 RX ORDER — PHENTERMINE HYDROCHLORIDE 37.5 MG/1
37.5 CAPSULE ORAL EVERY MORNING
Qty: 30 CAPSULE | Refills: 2 | Status: SHIPPED | OUTPATIENT
Start: 2024-03-13 | End: 2024-09-25

## 2024-03-13 RX ORDER — PHENTERMINE HYDROCHLORIDE 37.5 MG/1
37.5 CAPSULE ORAL EVERY MORNING
Qty: 30 CAPSULE | Refills: 2 | Status: SHIPPED | OUTPATIENT
Start: 2024-03-13 | End: 2024-03-13

## 2024-03-13 NOTE — PROGRESS NOTES
Transmission failed again -- call to pharmacy they do not see any of the 3 Rx's Dr. Dietz sent for Phentermine 37.5 mg. I asked her what we can do because it is getting rejected as a duplicate. She said I can fax it. Will fax copy at this time, pharmacy can contact us if they have further questions or need it sent a different way.     Yany Bansal R.N.

## 2024-04-19 ENCOUNTER — MYC MEDICAL ADVICE (OUTPATIENT)
Dept: FAMILY MEDICINE | Facility: CLINIC | Age: 49
End: 2024-04-19
Payer: COMMERCIAL

## 2024-04-19 DIAGNOSIS — E66.9 OBESITY (BMI 30-39.9): Primary | ICD-10-CM

## 2024-04-22 RX ORDER — PHENTERMINE HYDROCHLORIDE 15 MG/1
15 CAPSULE ORAL EVERY MORNING
Qty: 30 CAPSULE | Refills: 2 | Status: SHIPPED | OUTPATIENT
Start: 2024-04-22 | End: 2024-09-25

## 2024-05-04 ENCOUNTER — HEALTH MAINTENANCE LETTER (OUTPATIENT)
Age: 49
End: 2024-05-04

## 2024-07-24 NOTE — TELEPHONE ENCOUNTER
HOLD FOR PCP    Please see mychart in regards to phentermine dosage. Pt questions about going up in dose again.    Demetra KHAN RN

## 2024-07-27 RX ORDER — PHENTERMINE HYDROCHLORIDE 37.5 MG/1
37.5 CAPSULE ORAL EVERY MORNING
Qty: 30 CAPSULE | Refills: 0 | Status: SHIPPED | OUTPATIENT
Start: 2024-07-27 | End: 2024-09-12

## 2024-07-27 NOTE — TELEPHONE ENCOUNTER
Prescription sent for full dose of 37.5 mg phentermine, 1 tab p.o. daily.  Please schedule patient follow-up in 1 month for weight management.    KAMRAN

## 2024-08-25 ENCOUNTER — PATIENT OUTREACH (OUTPATIENT)
Dept: CARE COORDINATION | Facility: CLINIC | Age: 49
End: 2024-08-25
Payer: COMMERCIAL

## 2024-09-12 ENCOUNTER — MYC REFILL (OUTPATIENT)
Dept: FAMILY MEDICINE | Facility: CLINIC | Age: 49
End: 2024-09-12
Payer: COMMERCIAL

## 2024-09-12 DIAGNOSIS — E66.9 OBESITY (BMI 30-39.9): ICD-10-CM

## 2024-09-12 RX ORDER — PHENTERMINE HYDROCHLORIDE 37.5 MG/1
37.5 CAPSULE ORAL EVERY MORNING
Qty: 30 CAPSULE | Refills: 0 | Status: SHIPPED | OUTPATIENT
Start: 2024-09-12 | End: 2024-09-25

## 2024-09-25 ENCOUNTER — OFFICE VISIT (OUTPATIENT)
Dept: FAMILY MEDICINE | Facility: CLINIC | Age: 49
End: 2024-09-25
Payer: COMMERCIAL

## 2024-09-25 VITALS
SYSTOLIC BLOOD PRESSURE: 118 MMHG | HEART RATE: 79 BPM | OXYGEN SATURATION: 97 % | BODY MASS INDEX: 29.07 KG/M2 | HEIGHT: 61 IN | WEIGHT: 154 LBS | DIASTOLIC BLOOD PRESSURE: 78 MMHG | TEMPERATURE: 97.4 F | RESPIRATION RATE: 14 BRPM

## 2024-09-25 DIAGNOSIS — E66.09 CLASS 1 OBESITY DUE TO EXCESS CALORIES WITHOUT SERIOUS COMORBIDITY WITH BODY MASS INDEX (BMI) OF 30.0 TO 30.9 IN ADULT: Primary | ICD-10-CM

## 2024-09-25 DIAGNOSIS — E66.811 CLASS 1 OBESITY DUE TO EXCESS CALORIES WITHOUT SERIOUS COMORBIDITY WITH BODY MASS INDEX (BMI) OF 30.0 TO 30.9 IN ADULT: Primary | ICD-10-CM

## 2024-09-25 PROCEDURE — 99213 OFFICE O/P EST LOW 20 MIN: CPT | Performed by: FAMILY MEDICINE

## 2024-09-25 RX ORDER — PHENTERMINE HYDROCHLORIDE 37.5 MG/1
37.5 CAPSULE ORAL EVERY MORNING
Qty: 30 CAPSULE | Refills: 4 | Status: SHIPPED | OUTPATIENT
Start: 2024-09-25

## 2024-09-25 ASSESSMENT — PAIN SCALES - GENERAL: PAINLEVEL: NO PAIN (0)

## 2024-09-25 NOTE — PROGRESS NOTES
"  Assessment & Plan     Class 1 obesity due to excess calories without serious comorbidity with body mass index (BMI) of 30.0 to 30.9 in adult  Continue phentermine, okay to start Zepbound, discussed potential costs if not feasible, could also consider starting Topamax at night.  - phentermine (ADIPEX-P) 37.5 MG capsule  Dispense: 30 capsule; Refill: 4  - tirzepatide-Weight Management (ZEPBOUND) 2.5 MG/0.5ML prefilled pen  Dispense: 2 mL; Refill: 1          Nakia Davey is a 49 year old, presenting for the following health issues:  Recheck Medication and Breast Pain (Concerned with bilateral breast pain, right is worse, notices when she presses on them.)    History of Present Illness       Reason for visit:  Weight lost   She is taking medications regularly.       Medication Follow Up of Phentermine  Taking Medication as prescribed: yes  Side Effects:  None  Medication Helping Symptoms:  yes      Medication has been helpful, she would like to go down about 130 pounds, she is interested in GLP-1's.            Objective    /78 (BP Location: Right arm, Patient Position: Sitting, Cuff Size: Adult Regular)   Pulse 79   Temp 97.4  F (36.3  C) (Oral)   Resp 14   Ht 1.549 m (5' 1\")   Wt 69.9 kg (154 lb)   LMP 09/05/2024 (Approximate)   SpO2 97%   BMI 29.10 kg/m    Body mass index is 29.1 kg/m .  Physical Exam  Vitals reviewed.   Constitutional:       Appearance: She is not ill-appearing.   Cardiovascular:      Rate and Rhythm: Normal rate and regular rhythm.   Pulmonary:      Effort: Pulmonary effort is normal.      Breath sounds: Normal breath sounds.            Hemoglobin A1C   Date Value Ref Range Status   01/02/2024 5.8 (H) 0.0 - 5.6 % Final     Comment:     Normal <5.7%   Prediabetes 5.7-6.4%    Diabetes 6.5% or higher     Note: Adopted from ADA consensus guidelines.               Signed Electronically by: Victor Hugo Dietz MD    "

## 2024-10-07 ENCOUNTER — ANCILLARY PROCEDURE (OUTPATIENT)
Dept: MAMMOGRAPHY | Facility: CLINIC | Age: 49
End: 2024-10-07
Attending: FAMILY MEDICINE
Payer: COMMERCIAL

## 2024-10-07 DIAGNOSIS — Z12.31 VISIT FOR SCREENING MAMMOGRAM: ICD-10-CM

## 2024-10-07 PROCEDURE — 77067 SCR MAMMO BI INCL CAD: CPT | Mod: TC | Performed by: RADIOLOGY

## 2024-10-07 PROCEDURE — 77063 BREAST TOMOSYNTHESIS BI: CPT | Mod: TC | Performed by: RADIOLOGY

## 2024-10-23 DIAGNOSIS — E66.811 CLASS 1 OBESITY: Primary | ICD-10-CM

## 2024-10-23 RX ORDER — TOPIRAMATE 25 MG/1
12.5 TABLET, FILM COATED ORAL 2 TIMES DAILY
Qty: 30 TABLET | Refills: 2 | Status: SHIPPED | OUTPATIENT
Start: 2024-10-23

## 2025-03-17 ENCOUNTER — OFFICE VISIT (OUTPATIENT)
Dept: FAMILY MEDICINE | Facility: CLINIC | Age: 50
End: 2025-03-17

## 2025-03-17 VITALS
TEMPERATURE: 98.1 F | OXYGEN SATURATION: 98 % | SYSTOLIC BLOOD PRESSURE: 118 MMHG | DIASTOLIC BLOOD PRESSURE: 86 MMHG | HEART RATE: 64 BPM | HEIGHT: 61 IN | WEIGHT: 160 LBS | RESPIRATION RATE: 12 BRPM | BODY MASS INDEX: 30.21 KG/M2

## 2025-03-17 DIAGNOSIS — L71.9 ROSACEA: ICD-10-CM

## 2025-03-17 DIAGNOSIS — Z13.9 SCREENING FOR CONDITION: ICD-10-CM

## 2025-03-17 DIAGNOSIS — E66.811 CLASS 1 OBESITY WITH SERIOUS COMORBIDITY AND BODY MASS INDEX (BMI) OF 30.0 TO 30.9 IN ADULT, UNSPECIFIED OBESITY TYPE: Primary | ICD-10-CM

## 2025-03-17 DIAGNOSIS — B35.3 TINEA PEDIS OF LEFT FOOT: ICD-10-CM

## 2025-03-17 LAB
ERYTHROCYTE [DISTWIDTH] IN BLOOD BY AUTOMATED COUNT: 11.7 % (ref 10–15)
EST. AVERAGE GLUCOSE BLD GHB EST-MCNC: 123 MG/DL
HBA1C MFR BLD: 5.9 % (ref 0–5.6)
HCT VFR BLD AUTO: 40.8 % (ref 35–47)
HGB BLD-MCNC: 13.6 G/DL (ref 11.7–15.7)
MCH RBC QN AUTO: 31 PG (ref 26.5–33)
MCHC RBC AUTO-ENTMCNC: 33.3 G/DL (ref 31.5–36.5)
MCV RBC AUTO: 93 FL (ref 78–100)
PLATELET # BLD AUTO: 283 10E3/UL (ref 150–450)
RBC # BLD AUTO: 4.39 10E6/UL (ref 3.8–5.2)
WBC # BLD AUTO: 4.1 10E3/UL (ref 4–11)

## 2025-03-17 PROCEDURE — 99214 OFFICE O/P EST MOD 30 MIN: CPT | Performed by: FAMILY MEDICINE

## 2025-03-17 PROCEDURE — 80061 LIPID PANEL: CPT | Performed by: FAMILY MEDICINE

## 2025-03-17 PROCEDURE — 36415 COLL VENOUS BLD VENIPUNCTURE: CPT | Performed by: FAMILY MEDICINE

## 2025-03-17 PROCEDURE — G2211 COMPLEX E/M VISIT ADD ON: HCPCS | Performed by: FAMILY MEDICINE

## 2025-03-17 PROCEDURE — 84144 ASSAY OF PROGESTERONE: CPT | Performed by: FAMILY MEDICINE

## 2025-03-17 PROCEDURE — 80053 COMPREHEN METABOLIC PANEL: CPT | Performed by: FAMILY MEDICINE

## 2025-03-17 PROCEDURE — 83036 HEMOGLOBIN GLYCOSYLATED A1C: CPT | Performed by: FAMILY MEDICINE

## 2025-03-17 PROCEDURE — 84443 ASSAY THYROID STIM HORMONE: CPT | Performed by: FAMILY MEDICINE

## 2025-03-17 PROCEDURE — 82670 ASSAY OF TOTAL ESTRADIOL: CPT | Performed by: FAMILY MEDICINE

## 2025-03-17 PROCEDURE — 85027 COMPLETE CBC AUTOMATED: CPT | Performed by: FAMILY MEDICINE

## 2025-03-17 RX ORDER — CLOTRIMAZOLE AND BETAMETHASONE DIPROPIONATE 10; .64 MG/G; MG/G
CREAM TOPICAL 2 TIMES DAILY
Qty: 15 G | Refills: 0 | Status: SHIPPED | OUTPATIENT
Start: 2025-03-17

## 2025-03-17 RX ORDER — METRONIDAZOLE 10 MG/G
GEL TOPICAL DAILY
Qty: 60 G | Refills: 0 | Status: SHIPPED | OUTPATIENT
Start: 2025-03-17 | End: 2025-03-17

## 2025-03-17 RX ORDER — CLOTRIMAZOLE AND BETAMETHASONE DIPROPIONATE 10; .64 MG/G; MG/G
CREAM TOPICAL 2 TIMES DAILY
Qty: 15 G | Refills: 0 | Status: SHIPPED | OUTPATIENT
Start: 2025-03-17 | End: 2025-03-17

## 2025-03-17 RX ORDER — METRONIDAZOLE 10 MG/G
GEL TOPICAL DAILY
Qty: 60 G | Refills: 0 | Status: SHIPPED | OUTPATIENT
Start: 2025-03-17

## 2025-03-17 ASSESSMENT — PATIENT HEALTH QUESTIONNAIRE - PHQ9
SUM OF ALL RESPONSES TO PHQ QUESTIONS 1-9: 5
10. IF YOU CHECKED OFF ANY PROBLEMS, HOW DIFFICULT HAVE THESE PROBLEMS MADE IT FOR YOU TO DO YOUR WORK, TAKE CARE OF THINGS AT HOME, OR GET ALONG WITH OTHER PEOPLE: NOT DIFFICULT AT ALL
SUM OF ALL RESPONSES TO PHQ QUESTIONS 1-9: 5

## 2025-03-17 NOTE — PROGRESS NOTES
Assessment & Plan     Class 1 obesity with serious comorbidity and body mass index (BMI) of 30.0 to 30.9 in adult, unspecified obesity type  Sending to [facility]; increase monthly as tolerated, monitor for GI side effects.  I will continue to follow her weight loss journey and increase medications and monitor for side effects.  - Lipid panel reflex to direct LDL Fasting  - Lipid panel reflex to direct LDL Fasting  - tirzepatide-weight management (ZEPBOUND) 2.5 MG/0.5ML vial  Dispense: 2 mL; Refill: 0    Rosacea  Refilled.  - metroNIDAZOLE (METROGEL) 1 % external gel  Dispense: 60 g; Refill: 0    Tinea pedis of left foot  Start topical Lotrisone  - clotrimazole-betamethasone (LOTRISONE) 1-0.05 % external cream  Dispense: 15 g; Refill: 0    Screening for condition  A1c shows mild worsening, prediabetic.  I will follow-up with her once results are back.  - TSH with free T4 reflex  - CBC with platelets  - Hemoglobin A1c  - Comprehensive metabolic panel (BMP + Alb, Alk Phos, ALT, AST, Total. Bili, TP)  - Estradiol  - Progesterone  - TSH with free T4 reflex  - CBC with platelets  - Hemoglobin A1c  - Comprehensive metabolic panel (BMP + Alb, Alk Phos, ALT, AST, Total. Bili, TP)  - Estradiol  - Progesterone          Nakia Davey is a 50 year old, presenting for the following health issues:    Weight Problem (Weight management. She feels like she is gaining excessive weight. Questions about hormones. Patient would like to try sending Zepbound script straight to [facility].)        3/17/2025    11:13 AM   Additional Questions   Roomed by Quynh JADE     History of Present Illness       Reason for visit:  Weight management    She eats 0-1 servings of fruits and vegetables daily.She consumes 0 sweetened beverage(s) daily.She exercises with enough effort to increase her heart rate 9 or less minutes per day.  She exercises with enough effort to increase her heart rate 3 or less days per  "week.   She is taking medications regularly.      Patient presents for weight management.  They would like to switch on phentermine and Topamax in go to [facility] for self-pay Zepbound.    They have questions about hormonal screening would like to have some labs done today.     She would like a refill of metronidazole which has been helping for rosacea.    Left foot intermittently gets dry cracking flaky skin that gets irritated.                    Objective    /86 (Cuff Size: Adult Regular)   Pulse 64   Temp 98.1  F (36.7  C)   Resp 12   Ht 1.549 m (5' 1\")   Wt 72.6 kg (160 lb)   LMP 03/16/2025 (Exact Date)   SpO2 98%   BMI 30.23 kg/m    Body mass index is 30.23 kg/m .  Physical Exam  Constitutional:       Appearance: She is not ill-appearing.   Neck:      Comments: No thyromegaly  Cardiovascular:      Rate and Rhythm: Normal rate.   Pulmonary:      Effort: Pulmonary effort is normal.   Skin:     Comments: Mild erythematous papules on her face    Left foot, medial surface of the first tarsometatarsal joint with dry erythematous skin and flaking            Results for orders placed or performed in visit on 03/17/25 (from the past 24 hours)   CBC with platelets   Result Value Ref Range    WBC Count 4.1 4.0 - 11.0 10e3/uL    RBC Count 4.39 3.80 - 5.20 10e6/uL    Hemoglobin 13.6 11.7 - 15.7 g/dL    Hematocrit 40.8 35.0 - 47.0 %    MCV 93 78 - 100 fL    MCH 31.0 26.5 - 33.0 pg    MCHC 33.3 31.5 - 36.5 g/dL    RDW 11.7 10.0 - 15.0 %    Platelet Count 283 150 - 450 10e3/uL   Hemoglobin A1c   Result Value Ref Range    Estimated Average Glucose 123 (H) <117 mg/dL    Hemoglobin A1C 5.9 (H) 0.0 - 5.6 %           Signed Electronically by: Victor Hugo Dietz MD    "

## 2025-03-18 LAB
ALBUMIN SERPL BCG-MCNC: 4.4 G/DL (ref 3.5–5.2)
ALP SERPL-CCNC: 60 U/L (ref 40–150)
ALT SERPL W P-5'-P-CCNC: 30 U/L (ref 0–50)
ANION GAP SERPL CALCULATED.3IONS-SCNC: 13 MMOL/L (ref 7–15)
AST SERPL W P-5'-P-CCNC: 28 U/L (ref 0–45)
BILIRUB SERPL-MCNC: 0.5 MG/DL
BUN SERPL-MCNC: 13 MG/DL (ref 6–20)
CALCIUM SERPL-MCNC: 9.8 MG/DL (ref 8.8–10.4)
CHLORIDE SERPL-SCNC: 102 MMOL/L (ref 98–107)
CHOLEST SERPL-MCNC: 229 MG/DL
CREAT SERPL-MCNC: 0.5 MG/DL (ref 0.51–0.95)
EGFRCR SERPLBLD CKD-EPI 2021: >90 ML/MIN/1.73M2
ESTRADIOL SERPL-MCNC: 7 PG/ML
FASTING STATUS PATIENT QL REPORTED: YES
FASTING STATUS PATIENT QL REPORTED: YES
GLUCOSE SERPL-MCNC: 87 MG/DL (ref 70–99)
HCO3 SERPL-SCNC: 22 MMOL/L (ref 22–29)
HDLC SERPL-MCNC: 63 MG/DL
LDLC SERPL CALC-MCNC: 152 MG/DL
NONHDLC SERPL-MCNC: 166 MG/DL
POTASSIUM SERPL-SCNC: 4.3 MMOL/L (ref 3.4–5.3)
PROGEST SERPL-MCNC: 0.2 NG/ML
PROT SERPL-MCNC: 8.1 G/DL (ref 6.4–8.3)
SODIUM SERPL-SCNC: 137 MMOL/L (ref 135–145)
TRIGL SERPL-MCNC: 68 MG/DL
TSH SERPL DL<=0.005 MIU/L-ACNC: 1.23 UIU/ML (ref 0.3–4.2)

## 2025-04-29 DIAGNOSIS — E66.811 CLASS 1 OBESITY WITH SERIOUS COMORBIDITY AND BODY MASS INDEX (BMI) OF 30.0 TO 30.9 IN ADULT, UNSPECIFIED OBESITY TYPE: ICD-10-CM

## 2025-04-30 RX ORDER — TIRZEPATIDE 2.5 MG/.5ML
INJECTION, SOLUTION SUBCUTANEOUS
Qty: 2 ML | Refills: 0 | OUTPATIENT
Start: 2025-04-30

## 2025-06-30 DIAGNOSIS — E66.811 CLASS 1 OBESITY WITH SERIOUS COMORBIDITY AND BODY MASS INDEX (BMI) OF 30.0 TO 30.9 IN ADULT, UNSPECIFIED OBESITY TYPE: ICD-10-CM

## 2025-06-30 RX ORDER — TIRZEPATIDE 7.5 MG/.5ML
INJECTION, SOLUTION SUBCUTANEOUS
Qty: 2 ML | Refills: 1 | Status: SHIPPED | OUTPATIENT
Start: 2025-06-30

## 2025-09-03 SDOH — HEALTH STABILITY: PHYSICAL HEALTH: ON AVERAGE, HOW MANY DAYS PER WEEK DO YOU ENGAGE IN MODERATE TO STRENUOUS EXERCISE (LIKE A BRISK WALK)?: 1 DAY

## 2025-09-03 SDOH — HEALTH STABILITY: PHYSICAL HEALTH: ON AVERAGE, HOW MANY MINUTES DO YOU ENGAGE IN EXERCISE AT THIS LEVEL?: 30 MIN

## 2025-09-04 ENCOUNTER — OFFICE VISIT (OUTPATIENT)
Dept: FAMILY MEDICINE | Facility: CLINIC | Age: 50
End: 2025-09-04
Payer: COMMERCIAL

## 2025-09-04 VITALS
WEIGHT: 146 LBS | HEART RATE: 77 BPM | BODY MASS INDEX: 27.56 KG/M2 | SYSTOLIC BLOOD PRESSURE: 121 MMHG | HEIGHT: 61 IN | RESPIRATION RATE: 18 BRPM | TEMPERATURE: 98 F | DIASTOLIC BLOOD PRESSURE: 83 MMHG | OXYGEN SATURATION: 99 %

## 2025-09-04 DIAGNOSIS — Z00.00 ANNUAL PHYSICAL EXAM: Primary | ICD-10-CM

## 2025-09-04 DIAGNOSIS — R73.03 PREDIABETES: ICD-10-CM

## 2025-09-04 DIAGNOSIS — Z11.59 NEED FOR HEPATITIS C SCREENING TEST: ICD-10-CM

## 2025-09-04 DIAGNOSIS — E66.811 CLASS 1 OBESITY WITH SERIOUS COMORBIDITY AND BODY MASS INDEX (BMI) OF 30.0 TO 30.9 IN ADULT, UNSPECIFIED OBESITY TYPE: ICD-10-CM

## 2025-09-04 DIAGNOSIS — R21 RASH: ICD-10-CM

## 2025-09-04 DIAGNOSIS — M79.10 MUSCLE SORENESS: ICD-10-CM

## 2025-09-04 DIAGNOSIS — Z11.4 SCREENING FOR HIV (HUMAN IMMUNODEFICIENCY VIRUS): ICD-10-CM

## 2025-09-04 DIAGNOSIS — M25.562 ACUTE PAIN OF LEFT KNEE: ICD-10-CM

## 2025-09-04 LAB
ALBUMIN SERPL BCG-MCNC: 4.3 G/DL (ref 3.5–5.2)
ALP SERPL-CCNC: 53 U/L (ref 40–150)
ALT SERPL W P-5'-P-CCNC: 15 U/L (ref 0–50)
ANION GAP SERPL CALCULATED.3IONS-SCNC: 10 MMOL/L (ref 7–15)
AST SERPL W P-5'-P-CCNC: 20 U/L (ref 0–45)
BILIRUB SERPL-MCNC: 0.4 MG/DL
BUN SERPL-MCNC: 14.1 MG/DL (ref 6–20)
CALCIUM SERPL-MCNC: 9.8 MG/DL (ref 8.8–10.4)
CHLORIDE SERPL-SCNC: 103 MMOL/L (ref 98–107)
CHOLEST SERPL-MCNC: 203 MG/DL
CK SERPL-CCNC: 39 U/L (ref 26–192)
CREAT SERPL-MCNC: 0.52 MG/DL (ref 0.51–0.95)
EGFRCR SERPLBLD CKD-EPI 2021: >90 ML/MIN/1.73M2
ERYTHROCYTE [DISTWIDTH] IN BLOOD BY AUTOMATED COUNT: 12.1 % (ref 10–15)
EST. AVERAGE GLUCOSE BLD GHB EST-MCNC: 111 MG/DL
FASTING STATUS PATIENT QL REPORTED: YES
FASTING STATUS PATIENT QL REPORTED: YES
GLUCOSE SERPL-MCNC: 96 MG/DL (ref 70–99)
HBA1C MFR BLD: 5.5 % (ref 0–5.6)
HCO3 SERPL-SCNC: 25 MMOL/L (ref 22–29)
HCT VFR BLD AUTO: 41.5 % (ref 35–47)
HDLC SERPL-MCNC: 55 MG/DL
HGB BLD-MCNC: 13.6 G/DL (ref 11.7–15.7)
LDH SERPL L TO P-CCNC: 142 U/L (ref 0–250)
LDLC SERPL CALC-MCNC: 124 MG/DL
MCH RBC QN AUTO: 29.7 PG (ref 26.5–33)
MCHC RBC AUTO-ENTMCNC: 32.8 G/DL (ref 31.5–36.5)
MCV RBC AUTO: 90.6 FL (ref 78–100)
NONHDLC SERPL-MCNC: 148 MG/DL
PLATELET # BLD AUTO: 293 10E3/UL (ref 150–450)
POTASSIUM SERPL-SCNC: 3.8 MMOL/L (ref 3.4–5.3)
PROT SERPL-MCNC: 8.2 G/DL (ref 6.4–8.3)
RBC # BLD AUTO: 4.58 10E6/UL (ref 3.8–5.2)
SODIUM SERPL-SCNC: 138 MMOL/L (ref 135–145)
TRIGL SERPL-MCNC: 119 MG/DL
WBC # BLD AUTO: 3.74 10E3/UL (ref 4–11)

## 2025-09-04 RX ORDER — TIRZEPATIDE 12.5 MG/.5ML
12.5 INJECTION, SOLUTION SUBCUTANEOUS
Qty: 2 ML | Refills: 11 | Status: SHIPPED | OUTPATIENT
Start: 2025-09-04

## 2025-09-04 RX ORDER — CLINDAMYCIN PHOSPHATE 10 UG/ML
LOTION TOPICAL 2 TIMES DAILY
Qty: 60 ML | Refills: 0 | Status: SHIPPED | OUTPATIENT
Start: 2025-09-04 | End: 2025-09-11

## 2025-09-04 ASSESSMENT — PAIN SCALES - GENERAL: PAINLEVEL_OUTOF10: NO PAIN (0)

## (undated) DEVICE — ENDO BITE BLOCK ADULT OMNI-BLOC

## (undated) DEVICE — SOL WATER IRRIG 500ML BOTTLE 2F7113

## (undated) DEVICE — SUCTION MANIFOLD NEPTUNE 2 SYS 1 PORT 702-025-000

## (undated) DEVICE — SYR 30ML SLIP TIP W/O NDL 302833

## (undated) DEVICE — KIT ENDO TURNOVER/PROCEDURE CARRY-ON 101822

## (undated) DEVICE — SPECIMEN CONTAINER 3OZ W/FORMALIN 59901

## (undated) DEVICE — GLOVE EXAM NITRILE LG PF LATEX FREE 5064

## (undated) DEVICE — TUBING SUCTION MEDI-VAC 1/4"X20' N620A

## (undated) DEVICE — GOWN IMPERVIOUS 2XL BLUE

## (undated) DEVICE — SNARE CAPIVATOR ROUND COLD SNR BX10 M00561101

## (undated) DEVICE — ENDO SNARE EXACTO COLD 9MM LOOP 2.4MMX230CM 00711115

## (undated) DEVICE — ENDO TRAP POLYP E-TRAP 00711099

## (undated) DEVICE — ENDO FORCEP BX CAPTURA PRO SPIKE G50696

## (undated) DEVICE — SUCTION CATH AIRLIFE TRI-FLO W/CONTROL PORT 14FR  T60C